# Patient Record
Sex: MALE | Race: WHITE | NOT HISPANIC OR LATINO | URBAN - METROPOLITAN AREA
[De-identification: names, ages, dates, MRNs, and addresses within clinical notes are randomized per-mention and may not be internally consistent; named-entity substitution may affect disease eponyms.]

---

## 2018-08-18 ENCOUNTER — EMERGENCY (EMERGENCY)
Facility: HOSPITAL | Age: 78
LOS: 1 days | End: 2018-08-18
Payer: MEDICARE

## 2018-08-18 PROCEDURE — 99282 EMERGENCY DEPT VISIT SF MDM: CPT

## 2019-09-16 ENCOUNTER — APPOINTMENT (OUTPATIENT)
Dept: RADIOLOGY | Facility: CLINIC | Age: 79
End: 2019-09-16
Payer: MEDICARE

## 2019-09-16 PROCEDURE — 71046 X-RAY EXAM CHEST 2 VIEWS: CPT

## 2019-10-02 ENCOUNTER — INBOUND DOCUMENT (OUTPATIENT)
Age: 79
End: 2019-10-02

## 2019-10-02 PROBLEM — Z00.00 ENCOUNTER FOR PREVENTIVE HEALTH EXAMINATION: Status: ACTIVE | Noted: 2019-10-02

## 2019-10-07 ENCOUNTER — APPOINTMENT (OUTPATIENT)
Dept: OTOLARYNGOLOGY | Facility: CLINIC | Age: 79
End: 2019-10-07
Payer: MEDICARE

## 2019-10-07 VITALS
HEART RATE: 69 BPM | WEIGHT: 194 LBS | HEIGHT: 73 IN | SYSTOLIC BLOOD PRESSURE: 157 MMHG | BODY MASS INDEX: 25.71 KG/M2 | DIASTOLIC BLOOD PRESSURE: 94 MMHG

## 2019-10-07 VITALS — HEIGHT: 73 IN | WEIGHT: 194 LBS | BODY MASS INDEX: 25.71 KG/M2

## 2019-10-07 DIAGNOSIS — K13.70 UNSPECIFIED LESIONS OF ORAL MUCOSA: ICD-10-CM

## 2019-10-07 PROCEDURE — 31575 DIAGNOSTIC LARYNGOSCOPY: CPT

## 2019-10-07 PROCEDURE — 99205 OFFICE O/P NEW HI 60 MIN: CPT | Mod: 25

## 2019-10-07 NOTE — REVIEW OF SYSTEMS
[Throat Clearing] : throat clearing [Throat Pain] : throat pain [Throat Itching] : throat itching [Throat Dryness] : throat dryness [Recent Weight Loss (___ Lbs)] : recent [unfilled] ~Ulb weight loss [Negative] : Heme/Lymph

## 2019-10-07 NOTE — PHYSICAL EXAM
[de-identified] : Left level IIa LN approx. 2 cm, firm, mobile, no TTP.  No other LAD. [Laryngoscopy Performed] : laryngoscopy was performed, see procedure section for findings [FreeTextEntry1] : Ulcerated lesion involving the L. tonsillar fossa and extending into the palate.  No other lesions in the OC/OPx. [Normal] : no rashes

## 2019-10-07 NOTE — PROCEDURE
[Lesion] : lesion identified by mirror examination needing further evaluation [None] : none [Flexible Endoscope] : examined with the flexible endoscope [Serial Number: ___] : Serial Number: [unfilled] [de-identified] : The disease in the L. tonsillar fossa appears to involve the inferior soft palate and BOT and the posterior pharyngeal wall.  No lesions in the larynx or HPx, VC mobile, airway patent.

## 2019-10-07 NOTE — HISTORY OF PRESENT ILLNESS
[de-identified] : Referred by Dr. Kohler for an oral lesion and soreness.  Pt has dysphagia, odynophagia since early August.  Denies otalgia, weight loss, SOB.  He has been smoking 2 cigars a day for the past 40 years.  She stopped drinking about 20 years ago.  He initially though that this was related to recent dental work.  He was treated with a course of antibiotics.  However, his oral surgeon assured him that it wasn't and he was sent by him to Dr. Lira.  CT neck done on 10/2/19 at [facility] which showed a mass arising from the L tonsil with probably mets to L neck.

## 2019-10-07 NOTE — CONSULT LETTER
[Dear  ___] : Dear  [unfilled], [Consult Letter:] : I had the pleasure of evaluating your patient, [unfilled]. [Please see my note below.] : Please see my note below. [Consult Closing:] : Thank you very much for allowing me to participate in the care of this patient.  If you have any questions, please do not hesitate to contact me. [Sincerely,] : Sincerely, [FreeTextEntry2] : Dr. Jamel Lira\par [address], [address]\[place] [address] [FreeTextEntry3] : Dev

## 2019-10-14 ENCOUNTER — OUTPATIENT (OUTPATIENT)
Dept: OUTPATIENT SERVICES | Facility: HOSPITAL | Age: 79
LOS: 1 days | End: 2019-10-14

## 2019-10-14 VITALS
WEIGHT: 190.04 LBS | HEIGHT: 71 IN | TEMPERATURE: 98 F | RESPIRATION RATE: 14 BRPM | HEART RATE: 68 BPM | DIASTOLIC BLOOD PRESSURE: 70 MMHG | OXYGEN SATURATION: 99 % | SYSTOLIC BLOOD PRESSURE: 144 MMHG

## 2019-10-14 DIAGNOSIS — Z90.49 ACQUIRED ABSENCE OF OTHER SPECIFIED PARTS OF DIGESTIVE TRACT: Chronic | ICD-10-CM

## 2019-10-14 DIAGNOSIS — Z98.49 CATARACT EXTRACTION STATUS, UNSPECIFIED EYE: Chronic | ICD-10-CM

## 2019-10-14 DIAGNOSIS — C09.9 MALIGNANT NEOPLASM OF TONSIL, UNSPECIFIED: ICD-10-CM

## 2019-10-14 RX ORDER — SODIUM CHLORIDE 9 MG/ML
1000 INJECTION, SOLUTION INTRAVENOUS
Refills: 0 | Status: DISCONTINUED | OUTPATIENT
Start: 2019-10-16 | End: 2019-11-04

## 2019-10-14 RX ORDER — SODIUM CHLORIDE 9 MG/ML
3 INJECTION INTRAMUSCULAR; INTRAVENOUS; SUBCUTANEOUS EVERY 8 HOURS
Refills: 0 | Status: DISCONTINUED | OUTPATIENT
Start: 2019-10-16 | End: 2019-11-04

## 2019-10-14 NOTE — H&P PST ADULT - HISTORY OF PRESENT ILLNESS
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 Patient states he experienced pain in mouth while getting some dental work in early August, cores are in upper palate, mucosa is pink with opaque center, tender. Patiejt states he was unaware of sore prior to dental work, now reports increased pain moderately improved. 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 Patient states he experienced pain in mouth while getting some dental work in early August, sores located on upper palate, described as very tender 7 out 10 on pain scale moderately improved with pain medication and aggravated with eating and drinking. CT scan on 10/3/19 noted mass on left peritonsillar on fossa.

## 2019-10-14 NOTE — H&P PST ADULT - FUNCTIONAL LEVEL PRIOR: DRESSING
Medication was refilled as requested.    Chronic insomnia  Chronic nonintractable headache, unspecified headache type  -     amitriptyline (ELAVIL) 10 MG tablet; Take 3 tablets by mouth nightly.  #90, refill 11.    
Patient's mother calling- She needs Dr Faust to issue RX for Amitriptyline 10 mg tabs- quant of three nightly.     Ro Kemp    
Requesting Amitriptyline 10 mg taking 3 tabs at bedtime.     8/29/2017 Last office visit with Ann Faust     Wt Readings from Last 1 Encounters:   08/29/17 47.6 kg (16 %, Z= -1.00)*     * Growth percentiles are based on [facility] 2-20 Years data.        BP Readings from Last 2 Encounters:   08/29/17 92/62   09/08/15 102/62   ]    No results found for: SODIUM, POTASSIUM, CHLORIDE, CO2, BUN, CREATININE, GLUCOSE  No results found for: HGBA1C  No results found for: TSH  No results found for: CHOLESTEROL, HDL, CALCLDL, TRIGLYCERIDE  No results found for: AST, GPT, GGTP, ALKPT, BILIRUBIN    
0 = independent

## 2019-10-14 NOTE — H&P PST ADULT - LYMPHATIC
No supraclavicular or clavicular adenopathy noted on assessment supraclavicular R/anterior cervical L/supraclavicular L/posterior cervical L/posterior cervical R/anterior cervical R/y noted on assessment

## 2019-10-14 NOTE — H&P PST ADULT - NEGATIVE CARDIOVASCULAR SYMPTOMS
no palpitations/no paroxysmal nocturnal dyspnea/no peripheral edema/no dyspnea on exertion/no orthopnea/no chest pain/no claudication

## 2019-10-14 NOTE — H&P PST ADULT - RS GEN PE MLT RESP DETAILS PC
good air movement/clear to auscultation bilaterally/no rales/breath sounds equal/no rhonchi/airway patent/respirations non-labored/no wheezes

## 2019-10-14 NOTE — H&P PST ADULT - NEGATIVE ENMT SYMPTOMS
no dry mouth/no nasal congestion/no nasal obstruction/no abnormal taste sensation/no hearing difficulty/no gum bleeding/no throat pain/no dysphagia/no ear pain/no nasal discharge/no vertigo/no sinus symptoms/no tinnitus

## 2019-10-14 NOTE — H&P PST ADULT - NSICDXPROBLEM_GEN_ALL_CORE_FT
Problem:  malignant neoplasm of tonsil   Assessment and Plan: Patient scheduled for direct laryngoscopy with biopsy esophagoscopy on 10/16/19  Patient provided with verbal and written presurgical instructions; verbalized understanding  with teach back.    Patient provided with famotidine for GI prophylaxis; verbalized understanding.      Medical  evaluation in chart along with blood work and EKG  Echo and cardiac workup requested Problem:  malignant neoplasm of tonsil   Assessment and Plan: Patient scheduled for direct laryngoscopy with biopsy esophagoscopy on 10/16/19  Patient provided with verbal and written presurgical instructions; verbalized understanding  with teach back.    Patient provided with famotidine for GI prophylaxis; verbalized understanding.      Medical  evaluation in chart along with blood work and EKG, blood pressure elevated, PCP followup in 1 week, "anxiety-related?"  Echo requested

## 2019-10-14 NOTE — H&P PST ADULT - NEGATIVE GENERAL GENITOURINARY SYMPTOMS
Render Post-Care Instructions In Note?: no Consent: The patient's consent was obtained including but not limited to risks of crusting, scabbing, blistering, scarring, darker or lighter pigmentary change, recurrence, incomplete removal and infection. Detail Level: Simple Duration Of Freeze Thaw-Cycle (Seconds): 0 Post-Care Instructions: I reviewed with the patient in detail post-care instructions. Patient is to wear sunprotection, and avoid picking at any of the treated lesions. Pt may apply Vaseline to crusted or scabbing areas. normal urinary frequency/no nocturia/no hematuria/no renal colic/no bladder infections

## 2019-10-14 NOTE — H&P PST ADULT - ENMT COMMENTS
PMH malignant neoplasm of tonsil- mouth sore- tender sore located in upper palate sore located in left upper palate, pink borders and opaque center, tender to touch

## 2019-10-15 ENCOUNTER — TRANSCRIPTION ENCOUNTER (OUTPATIENT)
Age: 79
End: 2019-10-15

## 2019-10-16 ENCOUNTER — APPOINTMENT (OUTPATIENT)
Dept: OTOLARYNGOLOGY | Facility: HOSPITAL | Age: 79
End: 2019-10-16

## 2019-10-16 ENCOUNTER — RESULT REVIEW (OUTPATIENT)
Age: 79
End: 2019-10-16

## 2019-10-16 ENCOUNTER — OUTPATIENT (OUTPATIENT)
Dept: OUTPATIENT SERVICES | Facility: HOSPITAL | Age: 79
LOS: 1 days | Discharge: HOME | End: 2019-10-16
Payer: MEDICARE

## 2019-10-16 VITALS
OXYGEN SATURATION: 95 % | RESPIRATION RATE: 16 BRPM | SYSTOLIC BLOOD PRESSURE: 146 MMHG | DIASTOLIC BLOOD PRESSURE: 88 MMHG | HEART RATE: 72 BPM

## 2019-10-16 VITALS
HEIGHT: 71 IN | DIASTOLIC BLOOD PRESSURE: 83 MMHG | HEART RATE: 70 BPM | TEMPERATURE: 98 F | OXYGEN SATURATION: 96 % | SYSTOLIC BLOOD PRESSURE: 169 MMHG | RESPIRATION RATE: 18 BRPM | WEIGHT: 190.04 LBS

## 2019-10-16 DIAGNOSIS — Z98.49 CATARACT EXTRACTION STATUS, UNSPECIFIED EYE: Chronic | ICD-10-CM

## 2019-10-16 DIAGNOSIS — C09.9 MALIGNANT NEOPLASM OF TONSIL, UNSPECIFIED: ICD-10-CM

## 2019-10-16 DIAGNOSIS — Z90.49 ACQUIRED ABSENCE OF OTHER SPECIFIED PARTS OF DIGESTIVE TRACT: Chronic | ICD-10-CM

## 2019-10-16 PROCEDURE — 31535 LARYNGOSCOPY W/BIOPSY: CPT | Mod: GC

## 2019-10-16 PROCEDURE — 88367 INSITU HYBRIDIZATION AUTO: CPT | Mod: 26

## 2019-10-16 PROCEDURE — 88342 IMHCHEM/IMCYTCHM 1ST ANTB: CPT | Mod: 26,59

## 2019-10-16 PROCEDURE — 88305 TISSUE EXAM BY PATHOLOGIST: CPT | Mod: 26

## 2019-10-16 PROCEDURE — 43191 ESOPHAGOSCOPY RIGID TRNSO DX: CPT | Mod: GC

## 2019-10-16 PROCEDURE — 88331 PATH CONSLTJ SURG 1 BLK 1SPC: CPT | Mod: 26

## 2019-10-16 PROCEDURE — 88365 INSITU HYBRIDIZATION (FISH): CPT | Mod: 26,59

## 2019-10-16 RX ORDER — FENTANYL CITRATE 50 UG/ML
25 INJECTION INTRAVENOUS
Refills: 0 | Status: DISCONTINUED | OUTPATIENT
Start: 2019-10-16 | End: 2019-10-16

## 2019-10-16 NOTE — ASU DISCHARGE PLAN (ADULT/PEDIATRIC) - CARE PROVIDER_API CALL
Wilfred Seymour)  Otolaryngology  [address] [place] [address]  Phone: [phone]  Fax: [phone]  Follow Up Time:

## 2019-10-16 NOTE — ASU DISCHARGE PLAN (ADULT/PEDIATRIC) - CALL YOUR DOCTOR IF YOU HAVE ANY OF THE FOLLOWING:
Inability to tolerate liquids or foods/Pain not relieved by Medications/Nausea and vomiting that does not stop/Unable to urinate/Bleeding that does not stop/Fever greater than (need to indicate Fahrenheit or Celsius)

## 2019-10-17 ENCOUNTER — OUTPATIENT (OUTPATIENT)
Dept: OUTPATIENT SERVICES | Facility: HOSPITAL | Age: 79
LOS: 1 days | Discharge: HOME | End: 2019-10-17

## 2019-10-17 DIAGNOSIS — Z98.49 CATARACT EXTRACTION STATUS, UNSPECIFIED EYE: Chronic | ICD-10-CM

## 2019-10-17 DIAGNOSIS — C09.9 MALIGNANT NEOPLASM OF TONSIL, UNSPECIFIED: ICD-10-CM

## 2019-10-17 DIAGNOSIS — Z90.49 ACQUIRED ABSENCE OF OTHER SPECIFIED PARTS OF DIGESTIVE TRACT: Chronic | ICD-10-CM

## 2019-10-20 ENCOUNTER — APPOINTMENT (OUTPATIENT)
Dept: NUCLEAR MEDICINE | Facility: CLINIC | Age: 79
End: 2019-10-20

## 2019-10-20 ENCOUNTER — FORM ENCOUNTER (OUTPATIENT)
Age: 79
End: 2019-10-20

## 2019-10-21 ENCOUNTER — OUTPATIENT (OUTPATIENT)
Dept: OUTPATIENT SERVICES | Facility: HOSPITAL | Age: 79
LOS: 1 days | End: 2019-10-21
Payer: MEDICARE

## 2019-10-21 ENCOUNTER — APPOINTMENT (OUTPATIENT)
Dept: NUCLEAR MEDICINE | Facility: CLINIC | Age: 79
End: 2019-10-21
Payer: MEDICARE

## 2019-10-21 DIAGNOSIS — Z90.49 ACQUIRED ABSENCE OF OTHER SPECIFIED PARTS OF DIGESTIVE TRACT: Chronic | ICD-10-CM

## 2019-10-21 DIAGNOSIS — Z98.49 CATARACT EXTRACTION STATUS, UNSPECIFIED EYE: Chronic | ICD-10-CM

## 2019-10-21 DIAGNOSIS — Z00.8 ENCOUNTER FOR OTHER GENERAL EXAMINATION: ICD-10-CM

## 2019-10-21 PROCEDURE — A9552: CPT

## 2019-10-21 PROCEDURE — 78815 PET IMAGE W/CT SKULL-THIGH: CPT

## 2019-10-21 PROCEDURE — 78815 PET IMAGE W/CT SKULL-THIGH: CPT | Mod: 26,PI

## 2019-10-22 ENCOUNTER — APPOINTMENT (OUTPATIENT)
Dept: OTOLARYNGOLOGY | Facility: CLINIC | Age: 79
End: 2019-10-22
Payer: MEDICARE

## 2019-10-22 VITALS
DIASTOLIC BLOOD PRESSURE: 110 MMHG | HEART RATE: 102 BPM | WEIGHT: 194 LBS | BODY MASS INDEX: 25.71 KG/M2 | SYSTOLIC BLOOD PRESSURE: 127 MMHG | HEIGHT: 73 IN

## 2019-10-22 PROCEDURE — 99214 OFFICE O/P EST MOD 30 MIN: CPT | Mod: 25

## 2019-10-22 PROCEDURE — 31575 DIAGNOSTIC LARYNGOSCOPY: CPT

## 2019-10-22 NOTE — PROCEDURE
[Lesion] : lesion identified by mirror examination needing further evaluation [None] : none [Flexible Endoscope] : examined with the flexible endoscope [Serial Number: ___] : Serial Number: [unfilled] [de-identified] : The disease in the L. tonsillar fossa appears to involve the inferior soft palate and BOT and the posterior pharyngeal wall. No lesions in the larynx or HPx, VC mobile, airway patent. \par

## 2019-10-22 NOTE — DATA REVIEWED
[de-identified] : PET/CT read is pending.  On my review, the primary disease extends into the NPx and involving the soft palate, extending to the posterior pharyngeal wall to at least the midline.  There is avidity in ipsilateral level II and III LNs.  No obvious distant disease.

## 2019-10-22 NOTE — HISTORY OF PRESENT ILLNESS
[de-identified] : Pt is s/p tonsil biopsy on 10/16/19 which resulted as SCCa.  HPV and P 16 are pending.  Pt had PET done yesterday.  Pt has appointments scheduled for Dr. Vidal and Dr. Brown. Pt denies pain.  He has discomfort.  Pt is eating a soft diet.  He is maintaining his weight.  He denies any bleeding.

## 2019-10-22 NOTE — CONSULT LETTER
[Dear  ___] : Dear  [unfilled], [Courtesy Letter:] : I had the pleasure of seeing your patient, [unfilled], in my office today. [Please see my note below.] : Please see my note below. [FreeTextEntry2] : Dr. Jamel Lira\par [address] [facility], [address]\[place] [address]  [Consult Closing:] : Thank you very much for allowing me to participate in the care of this patient.  If you have any questions, please do not hesitate to contact me. [Sincerely,] : Sincerely, [FreeTextEntry3] : Dev

## 2019-10-22 NOTE — PHYSICAL EXAM
[de-identified] : Left level IIa LN approx. 2 cm, firm, mobile, no TTP.  No other LAD. [Laryngoscopy Performed] : laryngoscopy was performed, see procedure section for findings [FreeTextEntry1] : Ulcerated lesion involving the L. tonsillar fossa and extending into the palate and posterior pharyngeal wall.  No other lesions in the OC/OPx. [Normal] : orientation to person, place, and time: normal

## 2019-10-28 ENCOUNTER — OUTPATIENT (OUTPATIENT)
Dept: OUTPATIENT SERVICES | Facility: HOSPITAL | Age: 79
LOS: 1 days | Discharge: HOME | End: 2019-10-28
Payer: MEDICARE

## 2019-10-28 DIAGNOSIS — Z90.49 ACQUIRED ABSENCE OF OTHER SPECIFIED PARTS OF DIGESTIVE TRACT: Chronic | ICD-10-CM

## 2019-10-28 DIAGNOSIS — Z98.49 CATARACT EXTRACTION STATUS, UNSPECIFIED EYE: Chronic | ICD-10-CM

## 2019-10-29 ENCOUNTER — APPOINTMENT (OUTPATIENT)
Dept: RADIATION ONCOLOGY | Facility: CLINIC | Age: 79
End: 2019-10-29
Payer: MEDICARE

## 2019-10-29 VITALS
TEMPERATURE: 98 F | BODY MASS INDEX: 25.71 KG/M2 | WEIGHT: 194 LBS | DIASTOLIC BLOOD PRESSURE: 87 MMHG | HEART RATE: 90 BPM | RESPIRATION RATE: 16 BRPM | OXYGEN SATURATION: 95 % | SYSTOLIC BLOOD PRESSURE: 157 MMHG | HEIGHT: 73 IN

## 2019-10-29 DIAGNOSIS — F17.290 NICOTINE DEPENDENCE, OTHER TOBACCO PRODUCT, UNCOMPLICATED: ICD-10-CM

## 2019-10-29 DIAGNOSIS — Z78.9 OTHER SPECIFIED HEALTH STATUS: ICD-10-CM

## 2019-10-29 DIAGNOSIS — F17.200 NICOTINE DEPENDENCE, UNSPECIFIED, UNCOMPLICATED: ICD-10-CM

## 2019-10-29 PROCEDURE — 99205 OFFICE O/P NEW HI 60 MIN: CPT | Mod: 25

## 2019-10-29 PROCEDURE — 77263 THER RADIOLOGY TX PLNG CPLX: CPT

## 2019-10-29 NOTE — LETTER GREETING
[Dear Doctor] : Dear Doctor, [Consult Letter:] : Your patient, [unfilled] was seen in my office today for consultation. [Please see my note below.] : Please see my note below. [FreeTextEntry2] : Wilfred Seymour MD

## 2019-10-29 NOTE — REVIEW OF SYSTEMS
[Dysphagia] : dysphagia [Odynophagia] : odynophagia [Negative] : Allergic/Immunologic [Loss of Hearing] : no loss of hearing [Nosebleeds] : no nosebleeds [FreeTextEntry4] : new left tonsil cancer diagnosis

## 2019-10-29 NOTE — LETTER CLOSING
[Consult Closing:] : Thank you for allowing me to participate in the care of this patient.  If you have any questions, please do not hesitate to contact me. [Sincerely yours,] : Sincerely yours, [FreeTextEntry3] : Nimesh Brown MD\par Physician in Chief\par Department of Radiation Medicine\par [facility]\par [facility]\par \par  of Radiation Medicine\par [facility]\par at  [facility]\par \par Radiation \par [facility]/\par [facility]\par [address]\par [place], [place] [address]\par \par Tel: [phone]\par Fax: [phone]\par

## 2019-10-29 NOTE — DISEASE MANAGEMENT
[Clinical] : TNM Stage: c [IV] : IV [FreeTextEntry4] : squamous cell carcinoma [TTNM] : 4b [NTNM] : 2b [MTNM] : x [de-identified] : 7000cGy [de-identified] : posterior oropharynx and necks

## 2019-10-29 NOTE — PHYSICAL EXAM
[Normal] : supple with no thyromegaly or masses appreciated [de-identified] : large ulcerative left tonsillar mass with apparent extension to soft palate,  BOT and posteror pharyngeal wall. Mirror exam of hypopharynx otherwise unremarkable.

## 2019-10-29 NOTE — VITALS
[Maximal Pain Intensity: 5/10] : 5/10 [Pain Description/Quality: ___] : Pain description/quality: [unfilled] [Pain Duration: ___] : Pain duration: [unfilled] [Pain Location: ___] : Pain Location: [unfilled] [Pain Interferes with ADLs] : Pain interferes with activities of daily living. [Opioid] : opioid [80: Normal activity with effort; some signs or symptoms of disease.] : 80: Normal activity with effort; some signs or symptoms of disease.  [ECOG Performance Status: 1 - Restricted in physically strenuous activity but ambulatory and able to carry out work of a light or sedentary nature] : Performance Status: 1 - Restricted in physically strenuous activity but ambulatory and able to carry out work of a light or sedentary nature, e.g., light house work, office work [Least Pain Intensity: 1/10] : 1/10

## 2019-10-29 NOTE — HISTORY OF PRESENT ILLNESS
[FreeTextEntry1] : This 79 year-old man presents for radiation medicine consultation accompanied by his daughter Stacy.  He was referred here by Dr. Seymour for squamous cell carcinoma of the left tonsil. Patient reports having dysphagia and odynophagia since early August of this year.   He was smoking 2 cigars a day for the past 40 years, and quit with this diagnosis. He stopped drinking alcohol in 2007.  He initially though that this was related to recent dental work, and was treated with a course of antibiotics.  However, his oral surgeon Dr. Nieves, noted a tonsillar lesion and referred him to Dr. Lira, ENT.  CT scan of the neck done on 10/2/19 at [facility] showed a mass arising from the left tonsil with probable mets to left neck.   He was referred to Dr. Seymour who performed biopsy.  \par  \par

## 2019-10-30 RX ORDER — OXYCODONE 5 MG/1
5 TABLET ORAL
Qty: 30 | Refills: 0 | Status: DISCONTINUED | COMMUNITY
Start: 2019-10-29 | End: 2019-10-30

## 2019-10-31 ENCOUNTER — APPOINTMENT (OUTPATIENT)
Dept: HEMATOLOGY ONCOLOGY | Facility: CLINIC | Age: 79
End: 2019-10-31
Payer: MEDICARE

## 2019-10-31 ENCOUNTER — RESULT REVIEW (OUTPATIENT)
Age: 79
End: 2019-10-31

## 2019-10-31 VITALS
DIASTOLIC BLOOD PRESSURE: 90 MMHG | HEART RATE: 86 BPM | SYSTOLIC BLOOD PRESSURE: 139 MMHG | HEIGHT: 73 IN | TEMPERATURE: 98.5 F | BODY MASS INDEX: 24.39 KG/M2 | WEIGHT: 184.05 LBS | OXYGEN SATURATION: 96 %

## 2019-10-31 LAB
BASOPHILS # BLD AUTO: 0 K/UL — SIGNIFICANT CHANGE UP (ref 0–0.2)
BASOPHILS NFR BLD AUTO: 0.5 % — SIGNIFICANT CHANGE UP (ref 0–2)
EOSINOPHIL # BLD AUTO: 0.2 K/UL — SIGNIFICANT CHANGE UP (ref 0–0.5)
EOSINOPHIL NFR BLD AUTO: 2.2 % — SIGNIFICANT CHANGE UP (ref 0–6)
HCT VFR BLD CALC: 44.7 % — SIGNIFICANT CHANGE UP (ref 39–50)
HGB BLD-MCNC: 14.3 G/DL — SIGNIFICANT CHANGE UP (ref 13–17)
LYMPHOCYTES # BLD AUTO: 1.5 K/UL — SIGNIFICANT CHANGE UP (ref 1–3.3)
LYMPHOCYTES # BLD AUTO: 16.2 % — SIGNIFICANT CHANGE UP (ref 13–44)
MCHC RBC-ENTMCNC: 29.5 PG — SIGNIFICANT CHANGE UP (ref 27–34)
MCHC RBC-ENTMCNC: 32 G/DL — SIGNIFICANT CHANGE UP (ref 32–36)
MCV RBC AUTO: 92.2 FL — SIGNIFICANT CHANGE UP (ref 80–100)
MONOCYTES # BLD AUTO: 0.8 K/UL — SIGNIFICANT CHANGE UP (ref 0–0.9)
MONOCYTES NFR BLD AUTO: 8.4 % — SIGNIFICANT CHANGE UP (ref 2–14)
NEUTROPHILS # BLD AUTO: 6.8 K/UL — SIGNIFICANT CHANGE UP (ref 1.8–7.4)
NEUTROPHILS NFR BLD AUTO: 72.6 % — SIGNIFICANT CHANGE UP (ref 43–77)
PLATELET # BLD AUTO: 433 K/UL — HIGH (ref 150–400)
RBC # BLD: 4.85 M/UL — SIGNIFICANT CHANGE UP (ref 4.2–5.8)
RBC # FLD: 12.8 % — SIGNIFICANT CHANGE UP (ref 10.3–14.5)
WBC # BLD: 9.3 K/UL — SIGNIFICANT CHANGE UP (ref 3.8–10.5)
WBC # FLD AUTO: 9.3 K/UL — SIGNIFICANT CHANGE UP (ref 3.8–10.5)

## 2019-10-31 PROCEDURE — 99205 OFFICE O/P NEW HI 60 MIN: CPT

## 2019-10-31 RX ORDER — OXYCODONE AND ACETAMINOPHEN 5; 325 MG/1; MG/1
5-325 TABLET ORAL
Qty: 28 | Refills: 0 | Status: DISCONTINUED | COMMUNITY
Start: 2019-10-07 | End: 2019-10-31

## 2019-10-31 NOTE — PHYSICAL EXAM
[Restricted in physically strenuous activity but ambulatory and able to carry out work of a light or sedentary nature] : Status 1- Restricted in physically strenuous activity but ambulatory and able to carry out work of a light or sedentary nature, e.g., light house work, office work [Normal] : affect appropriate [de-identified] : Large left bulbus polypoid tonsillar mass  [de-identified] : No palpable cervical lymph nodes.

## 2019-10-31 NOTE — HISTORY OF PRESENT ILLNESS
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  He reported having dysphagia and odynophagia since early August of this year.  He initially thought that this was related to recent dental work, and was treated with a course of antibiotics. However, his oral surgeon Dr. Nieves, noted a tonsillar lesion and referred him to ENT, Dr. Lira. CT scan of the neck 10/2/19 at [facility] showed a mass arising from the left tonsil with probable metastatic disease to the left neck. He was referred to Dr. Seymour who performed a biopsy on 10/16/19, pathology c/w SCC.  He had a staging PET on 10/22/19 which showed an FDG avid large soft tissue mass in the left palatine tonsillar region, SUV 52.5. Mass extends from the soft palate, nasopharynx and lateral oropharyngeal wall, to the level of the aryepiglottic fold, as noted on diagnostic CT.  Activity crosses the midline at the level of the nasopharynx and uvula. Multiple FDG avid left level II cervical nodes were present. [de-identified] : He was smoking 2 cigars a day for the past 40 years, and quit with this diagnosis. He stopped drinking alcohol in 2007. Last colonoscopy in the 1970s.  [de-identified] : Patient presents for initial evaluation.  + Odynophagia but no dysphagia. + Hoarseness. + Weight loss.  ROS otherwise negative. No other complaints today.

## 2019-10-31 NOTE — CONSULT LETTER
[Dear  ___] : Dear  [unfilled], [Consult Letter:] : I had the pleasure of evaluating your patient, [unfilled]. [Please see my note below.] : Please see my note below. [Consult Closing:] : Thank you very much for allowing me to participate in the care of this patient.  If you have any questions, please do not hesitate to contact me. [Sincerely,] : Sincerely, [DrKailyn  ___] : Dr. ROCHA [FreeTextEntry3] : Dr. Selena Vidal

## 2019-10-31 NOTE — RESULTS/DATA
[FreeTextEntry1] : 10/22/19 PET:\par FDG avid large soft tissue mass in the left palatine tonsillar region, SUV 52.5. Mass extends from the soft palate, nasopharynx and lateral oropharyngeal wall, to the level of the aryepiglottic fold, as noted on diagnostic CT. Activity crosses the midline at the level of the nasopharynx and uvula. Multiple FDG avid left level II cervical nodes. Most prominent nodes: -IIB node, 1.0 x 0.7 cm, SUV 28.3.  -IIA node, 1.2 x 1.1 cm, SUV 19.6. Several mildly FDG avid small left level III/V cervical nodes. Level III node, 0.6 x 0.4 cm, SUV 2.8.  Asymmetrical FDG avid foci in the region of the right glossopharyngeal sulcus, SUV 7.6 and right base of tongue, SUV 10.2.  Several mildly FDG avid small right level II and III nodes. Level IIA node, 1.1 x 0.6 cm, SUV 4.5.  FDG-avid focus in the proximal sigmoid colon, SUV 18.7.  Enlarged prostate, 5 cm in maximum transverse dimension. No abnormal FDG activity.\par \par 10/16/19 Pathology:\par Tonsil, left, biopsy - Squamous cell carcinoma, non-keratinizing.  \par p16 immunostain on block 1FSA is negative.  HPV16/18/31 MARLENY performed on block 1FSA by [facility] is weakly positive in tumor cell nuclei.\par \par 10/2/19 CT Neck:\par mass arising in the left palatine tonsil with extension to the nasopharynx, soft palate and lateral oropharyngeal wall with probable metastatic luis felipe disease in left level I.

## 2019-11-01 LAB
ALBUMIN SERPL ELPH-MCNC: 4.2 G/DL
ALP BLD-CCNC: 119 U/L
ALT SERPL-CCNC: 17 U/L
ANION GAP SERPL CALC-SCNC: 13 MMOL/L
AST SERPL-CCNC: 12 U/L
BILIRUB SERPL-MCNC: 0.4 MG/DL
BUN SERPL-MCNC: 10 MG/DL
CALCIUM SERPL-MCNC: 9.9 MG/DL
CHLORIDE SERPL-SCNC: 99 MMOL/L
CO2 SERPL-SCNC: 29 MMOL/L
CREAT SERPL-MCNC: 0.81 MG/DL
GLUCOSE SERPL-MCNC: 96 MG/DL
HBV CORE IGG+IGM SER QL: NONREACTIVE
HBV SURFACE AB SER QL: NONREACTIVE
HBV SURFACE AG SER QL: NONREACTIVE
HCV AB SER QL: NONREACTIVE
HCV S/CO RATIO: 0.11 S/CO
INR PPP: 1.17 RATIO
MAGNESIUM SERPL-MCNC: 2.1 MG/DL
POTASSIUM SERPL-SCNC: 4.1 MMOL/L
PROT SERPL-MCNC: 7 G/DL
PT BLD: 13.4 SEC
SODIUM SERPL-SCNC: 141 MMOL/L

## 2019-11-05 ENCOUNTER — APPOINTMENT (OUTPATIENT)
Dept: GASTROENTEROLOGY | Facility: CLINIC | Age: 79
End: 2019-11-05
Payer: MEDICARE

## 2019-11-05 VITALS
HEART RATE: 114 BPM | SYSTOLIC BLOOD PRESSURE: 127 MMHG | DIASTOLIC BLOOD PRESSURE: 87 MMHG | WEIGHT: 186 LBS | HEIGHT: 73 IN | BODY MASS INDEX: 24.65 KG/M2

## 2019-11-05 DIAGNOSIS — Z46.59 ENCOUNTER FOR FITTING AND ADJUSTMENT OF OTHER GASTROINTESTINAL APPLIANCE AND DEVICE: ICD-10-CM

## 2019-11-05 DIAGNOSIS — Z87.891 PERSONAL HISTORY OF NICOTINE DEPENDENCE: ICD-10-CM

## 2019-11-05 PROCEDURE — 99204 OFFICE O/P NEW MOD 45 MIN: CPT

## 2019-11-05 RX ORDER — DIPHENHYDRAMINE HYDROCHLORIDE AND LIDOCAINE HYDROCHLORIDE AND ALUMINUM HYDROXIDE AND MAGNESIUM HYDRO
KIT
Qty: 1 | Refills: 3 | Status: COMPLETED | COMMUNITY
Start: 2019-10-31 | End: 2019-11-05

## 2019-11-05 RX ORDER — PROCHLORPERAZINE MALEATE 10 MG/1
10 TABLET ORAL EVERY 6 HOURS
Qty: 120 | Refills: 0 | Status: COMPLETED | COMMUNITY
Start: 2019-10-31 | End: 2019-11-05

## 2019-11-05 RX ORDER — ONDANSETRON 8 MG/1
8 TABLET, ORALLY DISINTEGRATING ORAL EVERY 8 HOURS
Qty: 90 | Refills: 0 | Status: COMPLETED | COMMUNITY
Start: 2019-10-31 | End: 2019-11-05

## 2019-11-05 NOTE — PHYSICAL EXAM
[General Appearance - Alert] : alert [General Appearance - In No Acute Distress] : in no acute distress [General Appearance - Well Nourished] : well nourished [General Appearance - Well Developed] : well developed [General Appearance - Well-Appearing] : healthy appearing [Sclera] : the sclera and conjunctiva were normal [PERRL With Normal Accommodation] : pupils were equal in size, round, and reactive to light [Extraocular Movements] : extraocular movements were intact [Outer Ear] : the ears and nose were normal in appearance [Examination Of The Oral Cavity] : the lips and gums were normal [Oropharynx] : the oropharynx was normal [Neck Appearance] : the appearance of the neck was normal [Neck Cervical Mass (___cm)] : no neck mass was observed [Jugular Venous Distention Increased] : there was no jugular-venous distention [Thyroid Diffuse Enlargement] : the thyroid was not enlarged [Thyroid Nodule] : there were no palpable thyroid nodules [Auscultation Breath Sounds / Voice Sounds] : lungs were clear to auscultation bilaterally [Heart Rate And Rhythm] : heart rate was normal and rhythm regular [Heart Sounds] : normal S1 and S2 [Heart Sounds Gallop] : no gallops [Murmurs] : no murmurs [Heart Sounds Pericardial Friction Rub] : no pericardial rub [Bowel Sounds] : normal bowel sounds [Abdomen Soft] : soft [Abdomen Tenderness] : non-tender [] : no hepato-splenomegaly [Abdomen Mass (___ Cm)] : no abdominal mass palpated [No CVA Tenderness] : no ~M costovertebral angle tenderness [Abnormal Walk] : normal gait [Musculoskeletal - Swelling] : no joint swelling seen [Skin Color & Pigmentation] : normal skin color and pigmentation [Skin Turgor] : normal skin turgor [Oriented To Time, Place, And Person] : oriented to person, place, and time [FreeTextEntry1] : not done not indicated

## 2019-11-05 NOTE — REVIEW OF SYSTEMS
[As Noted in HPI] : as noted in HPI [Negative] : Heme/Lymph [Abdominal Pain] : no abdominal pain [Vomiting] : no vomiting [Constipation] : no constipation [Diarrhea] : no diarrhea [Heartburn] : no heartburn [Melena] : no melena [FreeTextEntry7] : dysphagia

## 2019-11-05 NOTE — ASSESSMENT
[FreeTextEntry1] : Impression: Dysphagia secondary to left tonsillar cancer with neck invasion patient will be starting radiation therapy soon for this cancer and a feeding tube is requested.\par \par Recommendations: Upper endoscopy with PEG tube placement will be done within the next 2 weeks at [facility]. He had lab work on October 31 including PT INR all of which are acceptable and preprocedure surgical testing is not required. The risks versus benefits of upper endoscopy with PEG tube placement were explained to the patient who appeared to understand all of the above and was agreeable to proceeding with upper endoscopy with PEG tube placement. His ASA classification is 3 and he is medically optimized for the proposed procedure and appeared to understand all of the above instructions, information, and management plan.

## 2019-11-06 ENCOUNTER — OTHER (OUTPATIENT)
Age: 79
End: 2019-11-06

## 2019-11-07 PROCEDURE — 77300 RADIATION THERAPY DOSE PLAN: CPT | Mod: 26

## 2019-11-07 PROCEDURE — 77338 DESIGN MLC DEVICE FOR IMRT: CPT | Mod: 26

## 2019-11-07 PROCEDURE — 77301 RADIOTHERAPY DOSE PLAN IMRT: CPT | Mod: 26

## 2019-11-15 ENCOUNTER — RESULT REVIEW (OUTPATIENT)
Age: 79
End: 2019-11-15

## 2019-11-15 ENCOUNTER — APPOINTMENT (OUTPATIENT)
Age: 79
End: 2019-11-15

## 2019-11-15 ENCOUNTER — LABORATORY RESULT (OUTPATIENT)
Age: 79
End: 2019-11-15

## 2019-11-15 LAB
BASOPHILS # BLD AUTO: 0.1 K/UL — SIGNIFICANT CHANGE UP (ref 0–0.2)
BASOPHILS NFR BLD AUTO: 0.8 % — SIGNIFICANT CHANGE UP (ref 0–2)
EOSINOPHIL # BLD AUTO: 0.3 K/UL — SIGNIFICANT CHANGE UP (ref 0–0.5)
EOSINOPHIL NFR BLD AUTO: 1.8 % — SIGNIFICANT CHANGE UP (ref 0–6)
HCT VFR BLD CALC: 43.9 % — SIGNIFICANT CHANGE UP (ref 39–50)
HGB BLD-MCNC: 14.4 G/DL — SIGNIFICANT CHANGE UP (ref 13–17)
LYMPHOCYTES # BLD AUTO: 1.9 K/UL — SIGNIFICANT CHANGE UP (ref 1–3.3)
LYMPHOCYTES # BLD AUTO: 13 % — SIGNIFICANT CHANGE UP (ref 13–44)
MCHC RBC-ENTMCNC: 29.7 PG — SIGNIFICANT CHANGE UP (ref 27–34)
MCHC RBC-ENTMCNC: 32.8 G/DL — SIGNIFICANT CHANGE UP (ref 32–36)
MCV RBC AUTO: 90.6 FL — SIGNIFICANT CHANGE UP (ref 80–100)
MONOCYTES # BLD AUTO: 1.3 K/UL — HIGH (ref 0–0.9)
MONOCYTES NFR BLD AUTO: 9 % — SIGNIFICANT CHANGE UP (ref 2–14)
NEUTROPHILS # BLD AUTO: 11 K/UL — HIGH (ref 1.8–7.4)
NEUTROPHILS NFR BLD AUTO: 75.4 % — SIGNIFICANT CHANGE UP (ref 43–77)
PLATELET # BLD AUTO: 422 K/UL — HIGH (ref 150–400)
RBC # BLD: 4.84 M/UL — SIGNIFICANT CHANGE UP (ref 4.2–5.8)
RBC # FLD: 12.6 % — SIGNIFICANT CHANGE UP (ref 10.3–14.5)
WBC # BLD: 14.6 K/UL — HIGH (ref 3.8–10.5)
WBC # FLD AUTO: 14.6 K/UL — HIGH (ref 3.8–10.5)

## 2019-11-15 PROCEDURE — 93010 ELECTROCARDIOGRAM REPORT: CPT

## 2019-11-15 PROCEDURE — 77427 RADIATION TX MANAGEMENT X5: CPT

## 2019-11-15 PROCEDURE — 77387B: CUSTOM | Mod: 26

## 2019-11-18 ENCOUNTER — OUTPATIENT (OUTPATIENT)
Dept: OUTPATIENT SERVICES | Facility: HOSPITAL | Age: 79
LOS: 1 days | Discharge: HOME | End: 2019-11-18

## 2019-11-18 VITALS
DIASTOLIC BLOOD PRESSURE: 74 MMHG | BODY MASS INDEX: 24.39 KG/M2 | WEIGHT: 184 LBS | SYSTOLIC BLOOD PRESSURE: 112 MMHG | HEART RATE: 78 BPM | TEMPERATURE: 98 F | OXYGEN SATURATION: 98 % | RESPIRATION RATE: 16 BRPM | HEIGHT: 73 IN

## 2019-11-18 DIAGNOSIS — Z90.49 ACQUIRED ABSENCE OF OTHER SPECIFIED PARTS OF DIGESTIVE TRACT: Chronic | ICD-10-CM

## 2019-11-18 DIAGNOSIS — E86.0 DEHYDRATION: ICD-10-CM

## 2019-11-18 DIAGNOSIS — Z51.11 ENCOUNTER FOR ANTINEOPLASTIC CHEMOTHERAPY: ICD-10-CM

## 2019-11-18 DIAGNOSIS — Z98.49 CATARACT EXTRACTION STATUS, UNSPECIFIED EYE: Chronic | ICD-10-CM

## 2019-11-18 DIAGNOSIS — R11.2 NAUSEA WITH VOMITING, UNSPECIFIED: ICD-10-CM

## 2019-11-18 DIAGNOSIS — C76.0 MALIGNANT NEOPLASM OF HEAD, FACE AND NECK: ICD-10-CM

## 2019-11-18 DIAGNOSIS — C09.9 MALIGNANT NEOPLASM OF TONSIL, UNSPECIFIED: ICD-10-CM

## 2019-11-18 PROCEDURE — 77387B: CUSTOM | Mod: 26

## 2019-11-18 NOTE — REVIEW OF SYSTEMS
[Constipation: Grade 0] : Constipation: Grade 0 [Diarrhea: Grade 0] : Diarrhea: Grade 0 [Dysphagia: Grade 0] : Dysphagia: Grade 0 [Vomiting: Grade 0] : Vomiting: Grade 0 [Nausea: Grade 1 - Loss of appetite without alteration in eating habits] : Nausea: Grade 1 - Loss of appetite without alteration in eating habits [Fatigue: Grade 0] : Fatigue: Grade 0

## 2019-11-18 NOTE — VITALS
[Maximal Pain Intensity: 0/10] : 0/10 [Least Pain Intensity: 0/10] : 0/10 [80: Normal activity with effort; some signs or symptoms of disease.] : 80: Normal activity with effort; some signs or symptoms of disease.  [NoTreatment Scheduled] : no treatment scheduled [ECOG Performance Status: 2 - Ambulatory and capable of all self care but unable to carry out any work activities] : Performance Status: 2 - Ambulatory and capable of all self care but unable to carry out any work activities. Up and about more than 50% of waking hours

## 2019-11-18 NOTE — DISEASE MANAGEMENT
[IV] : IV [Clinical] : TNM Stage: c [FreeTextEntry4] : squamous cell carcinoma [NTNM] : 2b [TTNM] : 4b [de-identified] : 7,000 cGy [MTNM] : 0 [de-identified] : 400 cGy [de-identified] : posterior oropharynx and necks

## 2019-11-19 PROCEDURE — 77387B: CUSTOM | Mod: 26

## 2019-11-20 PROCEDURE — 77014: CPT | Mod: 26

## 2019-11-21 ENCOUNTER — OUTPATIENT (OUTPATIENT)
Dept: OUTPATIENT SERVICES | Facility: HOSPITAL | Age: 79
LOS: 1 days | End: 2019-11-21
Payer: MEDICARE

## 2019-11-21 ENCOUNTER — APPOINTMENT (OUTPATIENT)
Dept: GASTROENTEROLOGY | Facility: HOSPITAL | Age: 79
End: 2019-11-21

## 2019-11-21 DIAGNOSIS — K29.90 GASTRODUODENITIS, UNSPECIFIED, W/OUT BLEEDING: ICD-10-CM

## 2019-11-21 DIAGNOSIS — Z98.49 CATARACT EXTRACTION STATUS, UNSPECIFIED EYE: Chronic | ICD-10-CM

## 2019-11-21 DIAGNOSIS — C09.9 MALIGNANT NEOPLASM OF TONSIL, UNSPECIFIED: ICD-10-CM

## 2019-11-21 DIAGNOSIS — Z90.49 ACQUIRED ABSENCE OF OTHER SPECIFIED PARTS OF DIGESTIVE TRACT: Chronic | ICD-10-CM

## 2019-11-21 DIAGNOSIS — R13.12 DYSPHAGIA, OROPHARYNGEAL PHASE: ICD-10-CM

## 2019-11-21 PROCEDURE — 43246 EGD PLACE GASTROSTOMY TUBE: CPT

## 2019-11-21 PROCEDURE — L8699: CPT

## 2019-11-21 NOTE — PROCEDURE
[Procedure Explained] : The procedure was explained [Allergies Reviewed] : allergies reviewed. [Risks] : Risks [Benefits] : benefits [Alternatives] : alternatives [Consent Obtained] : written consent was obtained prior to the procedure and is detailed in the patient's record [Patient] : the patient [Automated Blood Pressure Cuff] : automated blood pressure cuff [Cardiac Monitor] : cardiac monitor [Pulse Oximeter] : pulse oximeter [___ L/min Oxygen via NC] : [unfilled] ~Uliters/minute oxygen via nasal cannula [3] : 3 [Sedation Clearance] : the patient was cleared for moderate sedation [Performed By: ___] : Performed by:  AJ [Propofol ___ mg IV] : Propofol [unfilled] ~Umg intravenously [Time started: ___] : Start Time:  [unfilled] [Time Completed: ___] : Completion Time:  [unfilled] [Erythema] : erythema [Normal] : Normal [Tolerated Well] : the patient tolerated the procedure well [Vital Signs Stable] : the vital signs were stable [de-identified] : With PEG tube placement [de-identified] : Oropharyngeal dysphagia [de-identified] : PEG tube placed uneventfully. [de-identified] : Moderate antral gastritis seen [de-identified] : Bulbar erythema [de-identified] : Antral gastritis and duodenitis s/p successful PEG tube placement.

## 2019-11-21 NOTE — PROCEDURE
[Procedure Explained] : The procedure was explained [Allergies Reviewed] : allergies reviewed. [Risks] : Risks [Benefits] : benefits [Alternatives] : alternatives [Consent Obtained] : written consent was obtained prior to the procedure and is detailed in the patient's record [Patient] : the patient [Automated Blood Pressure Cuff] : automated blood pressure cuff [Cardiac Monitor] : cardiac monitor [Pulse Oximeter] : pulse oximeter [___ L/min Oxygen via NC] : [unfilled] ~Uliters/minute oxygen via nasal cannula [3] : 3 [Sedation Clearance] : the patient was cleared for moderate sedation [Performed By: ___] : Performed by:  AJ [Propofol ___ mg IV] : Propofol [unfilled] ~Umg intravenously [Time started: ___] : Start Time:  [unfilled] [Time Completed: ___] : Completion Time:  [unfilled] [Erythema] : erythema [Normal] : Normal [Tolerated Well] : the patient tolerated the procedure well [Vital Signs Stable] : the vital signs were stable [de-identified] : With PEG tube placement [de-identified] : Oropharyngeal dysphagia [de-identified] : PEG tube placed uneventfully. [de-identified] : Moderate antral gastritis seen [de-identified] : Bulbar erythema [de-identified] : Antral gastritis and duodenitis s/p successful PEG tube placement.

## 2019-11-21 NOTE — REVIEW OF SYSTEMS
[Negative] : Heme/Lymph [Abdominal Pain] : no abdominal pain [Vomiting] : no vomiting [Constipation] : no constipation [Diarrhea] : no diarrhea [Heartburn] : no heartburn [Melena] : no melena [FreeTextEntry7] : oropharyngeal dysphagia

## 2019-11-21 NOTE — REASON FOR VISIT
[Procedure: _________] : a [unfilled] procedure visit [Endoscopy] : an endoscopy [FreeTextEntry2] : EGD with PEG placement

## 2019-11-22 ENCOUNTER — APPOINTMENT (OUTPATIENT)
Dept: HEMATOLOGY ONCOLOGY | Facility: CLINIC | Age: 79
End: 2019-11-22
Payer: MEDICARE

## 2019-11-22 ENCOUNTER — APPOINTMENT (OUTPATIENT)
Dept: HEMATOLOGY ONCOLOGY | Facility: CLINIC | Age: 79
End: 2019-11-22

## 2019-11-22 ENCOUNTER — LABORATORY RESULT (OUTPATIENT)
Age: 79
End: 2019-11-22

## 2019-11-22 ENCOUNTER — RESULT REVIEW (OUTPATIENT)
Age: 79
End: 2019-11-22

## 2019-11-22 ENCOUNTER — APPOINTMENT (OUTPATIENT)
Age: 79
End: 2019-11-22

## 2019-11-22 LAB
BASOPHILS # BLD AUTO: 0.1 K/UL — SIGNIFICANT CHANGE UP (ref 0–0.2)
BASOPHILS NFR BLD AUTO: 0.9 % — SIGNIFICANT CHANGE UP (ref 0–2)
EOSINOPHIL # BLD AUTO: 0.1 K/UL — SIGNIFICANT CHANGE UP (ref 0–0.5)
EOSINOPHIL NFR BLD AUTO: 0.7 % — SIGNIFICANT CHANGE UP (ref 0–6)
HCT VFR BLD CALC: 41.7 % — SIGNIFICANT CHANGE UP (ref 39–50)
HGB BLD-MCNC: 14.4 G/DL — SIGNIFICANT CHANGE UP (ref 13–17)
LYMPHOCYTES # BLD AUTO: 1.5 K/UL — SIGNIFICANT CHANGE UP (ref 1–3.3)
LYMPHOCYTES # BLD AUTO: 15 % — SIGNIFICANT CHANGE UP (ref 13–44)
MCHC RBC-ENTMCNC: 30.6 PG — SIGNIFICANT CHANGE UP (ref 27–34)
MCHC RBC-ENTMCNC: 34.6 G/DL — SIGNIFICANT CHANGE UP (ref 32–36)
MCV RBC AUTO: 88.6 FL — SIGNIFICANT CHANGE UP (ref 80–100)
MONOCYTES # BLD AUTO: 0.6 K/UL — SIGNIFICANT CHANGE UP (ref 0–0.9)
MONOCYTES NFR BLD AUTO: 5.9 % — SIGNIFICANT CHANGE UP (ref 2–14)
NEUTROPHILS # BLD AUTO: 8 K/UL — HIGH (ref 1.8–7.4)
NEUTROPHILS NFR BLD AUTO: 77.5 % — HIGH (ref 43–77)
PLATELET # BLD AUTO: 375 K/UL — SIGNIFICANT CHANGE UP (ref 150–400)
RBC # BLD: 4.71 M/UL — SIGNIFICANT CHANGE UP (ref 4.2–5.8)
RBC # FLD: 11.7 % — SIGNIFICANT CHANGE UP (ref 10.3–14.5)
WBC # BLD: 10.3 K/UL — SIGNIFICANT CHANGE UP (ref 3.8–10.5)
WBC # FLD AUTO: 10.3 K/UL — SIGNIFICANT CHANGE UP (ref 3.8–10.5)

## 2019-11-22 PROCEDURE — 77387B: CUSTOM | Mod: 26

## 2019-11-22 PROCEDURE — 99213 OFFICE O/P EST LOW 20 MIN: CPT

## 2019-11-22 NOTE — REVIEW OF SYSTEMS
[Recent Change In Weight] : ~T recent weight change [Hoarseness] : hoarseness [Odynophagia] : odynophagia [Negative] : Allergic/Immunologic

## 2019-11-22 NOTE — PHYSICAL EXAM
[Restricted in physically strenuous activity but ambulatory and able to carry out work of a light or sedentary nature] : Status 1- Restricted in physically strenuous activity but ambulatory and able to carry out work of a light or sedentary nature, e.g., light house work, office work [Normal] : affect appropriate [de-identified] : Large left bulbus polypoid tonsillar mass  [de-identified] : No palpable cervical lymph nodes.

## 2019-11-22 NOTE — HISTORY OF PRESENT ILLNESS
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  He reported having dysphagia and odynophagia since early August of this year.  He initially thought that this was related to recent dental work, and was treated with a course of antibiotics. However, his oral surgeon Dr. Nieves, noted a tonsillar lesion and referred him to ENT, Dr. Lira. CT scan of the neck 10/2/19 at [facility] showed a mass arising from the left tonsil with probable metastatic disease to the left neck. He was referred to Dr. Seymour who performed a biopsy on 10/16/19, pathology c/w SCC.  He had a staging PET on 10/22/19 which showed an FDG avid large soft tissue mass in the left palatine tonsillar region, SUV 52.5. Mass extends from the soft palate, nasopharynx and lateral oropharyngeal wall, to the level of the aryepiglottic fold, as noted on diagnostic CT.  Activity crosses the midline at the level of the nasopharynx and uvula. Multiple FDG avid left level II cervical nodes were present. [de-identified] : He was smoking 2 cigars a day for the past 40 years, and quit with this diagnosis. He stopped drinking alcohol in 2007. Last colonoscopy in the 1970s.  [de-identified] : Patient presents for C2D1 CRT with weekly Cisplatin for SCC of the left tonsil.  + Odynophagia improved since starting RT.  No dysphagia. + Hoarseness. + Decreased appetite.  PEG placed yesterday.  + Pain at PEG site.  + Mild xerostomia.   No excessive secretions.

## 2019-11-25 VITALS
BODY MASS INDEX: 23.75 KG/M2 | DIASTOLIC BLOOD PRESSURE: 67 MMHG | WEIGHT: 180 LBS | SYSTOLIC BLOOD PRESSURE: 107 MMHG | HEART RATE: 78 BPM | OXYGEN SATURATION: 98 % | TEMPERATURE: 97.6 F | RESPIRATION RATE: 16 BRPM

## 2019-11-25 PROCEDURE — 77427 RADIATION TX MANAGEMENT X5: CPT

## 2019-11-25 PROCEDURE — 77387B: CUSTOM | Mod: 26

## 2019-11-25 NOTE — VITALS
[Maximal Pain Intensity: 0/10] : 0/10 [80: Normal activity with effort; some signs or symptoms of disease.] : 80: Normal activity with effort; some signs or symptoms of disease.  [Least Pain Intensity: 0/10] : 0/10 [NoTreatment Scheduled] : no treatment scheduled [ECOG Performance Status: 2 - Ambulatory and capable of all self care but unable to carry out any work activities] : Performance Status: 2 - Ambulatory and capable of all self care but unable to carry out any work activities. Up and about more than 50% of waking hours

## 2019-11-25 NOTE — REVIEW OF SYSTEMS
[Constipation: Grade 0] : Constipation: Grade 0 [Diarrhea: Grade 0] : Diarrhea: Grade 0 [Dysphagia: Grade 0] : Dysphagia: Grade 0 [Nausea: Grade 0] : Nausea: Grade 0 [Vomiting: Grade 0] : Vomiting: Grade 0 [Fatigue: Grade 0] : Fatigue: Grade 0 [Skin Hyperpigmentation: Grade 0] : Skin Hyperpigmentation: Grade 0 [Dermatitis Radiation: Grade 0] : Dermatitis Radiation: Grade 0

## 2019-11-25 NOTE — DISEASE MANAGEMENT
[Clinical] : TNM Stage: c [TTNM] : 4b [NTNM] : 2b [FreeTextEntry4] : squamous cell carcinoma [IV] : IV [de-identified] : 1,200 cGy [MTNM] : 0 [de-identified] : 7,000 cGy [de-identified] : posterior oropharynx and necks

## 2019-11-26 ENCOUNTER — OTHER (OUTPATIENT)
Age: 79
End: 2019-11-26

## 2019-11-26 PROCEDURE — 77387B: CUSTOM | Mod: 26

## 2019-11-27 PROCEDURE — 77014: CPT | Mod: 26

## 2019-11-29 ENCOUNTER — RESULT REVIEW (OUTPATIENT)
Age: 79
End: 2019-11-29

## 2019-11-29 ENCOUNTER — LABORATORY RESULT (OUTPATIENT)
Age: 79
End: 2019-11-29

## 2019-11-29 ENCOUNTER — APPOINTMENT (OUTPATIENT)
Age: 79
End: 2019-11-29

## 2019-11-29 ENCOUNTER — APPOINTMENT (OUTPATIENT)
Dept: HEMATOLOGY ONCOLOGY | Facility: CLINIC | Age: 79
End: 2019-11-29

## 2019-11-29 LAB
BASOPHILS # BLD AUTO: 0.1 K/UL — SIGNIFICANT CHANGE UP (ref 0–0.2)
EOSINOPHIL # BLD AUTO: 0.1 K/UL — SIGNIFICANT CHANGE UP (ref 0–0.5)
EOSINOPHIL NFR BLD AUTO: 1 % — SIGNIFICANT CHANGE UP (ref 0–6)
HCT VFR BLD CALC: 41.1 % — SIGNIFICANT CHANGE UP (ref 39–50)
HGB BLD-MCNC: 13.8 G/DL — SIGNIFICANT CHANGE UP (ref 13–17)
LYMPHOCYTES # BLD AUTO: 1.2 K/UL — SIGNIFICANT CHANGE UP (ref 1–3.3)
LYMPHOCYTES # BLD AUTO: 19 % — SIGNIFICANT CHANGE UP (ref 13–44)
MCHC RBC-ENTMCNC: 30.2 PG — SIGNIFICANT CHANGE UP (ref 27–34)
MCHC RBC-ENTMCNC: 33.7 G/DL — SIGNIFICANT CHANGE UP (ref 32–36)
MCV RBC AUTO: 89.7 FL — SIGNIFICANT CHANGE UP (ref 80–100)
MONOCYTES # BLD AUTO: 0.4 K/UL — SIGNIFICANT CHANGE UP (ref 0–0.9)
MONOCYTES NFR BLD AUTO: 8 % — SIGNIFICANT CHANGE UP (ref 2–14)
NEUTROPHILS # BLD AUTO: 3.8 K/UL — SIGNIFICANT CHANGE UP (ref 1.8–7.4)
NEUTROPHILS NFR BLD AUTO: 72 % — SIGNIFICANT CHANGE UP (ref 43–77)
OVALOCYTES BLD QL SMEAR: SLIGHT — SIGNIFICANT CHANGE UP
PLAT MORPH BLD: NORMAL — SIGNIFICANT CHANGE UP
PLATELET # BLD AUTO: 261 K/UL — SIGNIFICANT CHANGE UP (ref 150–400)
RBC # BLD: 4.58 M/UL — SIGNIFICANT CHANGE UP (ref 4.2–5.8)
RBC # FLD: 12.3 % — SIGNIFICANT CHANGE UP (ref 10.3–14.5)
RBC BLD AUTO: NORMAL — SIGNIFICANT CHANGE UP
WBC # BLD: 5.6 K/UL — SIGNIFICANT CHANGE UP (ref 3.8–10.5)
WBC # FLD AUTO: 5.6 K/UL — SIGNIFICANT CHANGE UP (ref 3.8–10.5)

## 2019-11-29 PROCEDURE — 77387B: CUSTOM | Mod: 26

## 2019-12-02 VITALS
SYSTOLIC BLOOD PRESSURE: 142 MMHG | DIASTOLIC BLOOD PRESSURE: 94 MMHG | WEIGHT: 179.4 LBS | OXYGEN SATURATION: 98 % | RESPIRATION RATE: 16 BRPM | BODY MASS INDEX: 23.67 KG/M2 | HEART RATE: 91 BPM | TEMPERATURE: 97.6 F

## 2019-12-02 PROCEDURE — 77387B: CUSTOM | Mod: 26

## 2019-12-02 NOTE — REVIEW OF SYSTEMS
[Constipation: Grade 0] : Constipation: Grade 0 [Diarrhea: Grade 0] : Diarrhea: Grade 0 [Dysphagia: Grade 0] : Dysphagia: Grade 0 [Vomiting: Grade 0] : Vomiting: Grade 0 [Nausea: Grade 0] : Nausea: Grade 0 [Fatigue: Grade 0] : Fatigue: Grade 0 [Skin Hyperpigmentation: Grade 0] : Skin Hyperpigmentation: Grade 0 [Dermatitis Radiation: Grade 0] : Dermatitis Radiation: Grade 0

## 2019-12-02 NOTE — DISEASE MANAGEMENT
[FreeTextEntry4] : squamous cell carcinoma [Clinical] : TNM Stage: c [TTNM] : 4b [NTNM] : 2b [MTNM] : 0 [IV] : IV [de-identified] : 2,000 cGy [de-identified] : posterior oropharynx and necks [de-identified] : 7,000 cGy

## 2019-12-02 NOTE — PHYSICAL EXAM
[Normal] : normal heart rate and rhythm, normal S1 and S2, and no murmurs present [Feeding Tube] : a feeding tube was present

## 2019-12-03 ENCOUNTER — OTHER (OUTPATIENT)
Age: 79
End: 2019-12-03

## 2019-12-03 PROCEDURE — 77387B: CUSTOM | Mod: 26

## 2019-12-03 PROCEDURE — 77427 RADIATION TX MANAGEMENT X5: CPT

## 2019-12-04 ENCOUNTER — OTHER (OUTPATIENT)
Age: 79
End: 2019-12-04

## 2019-12-04 PROCEDURE — 77387B: CUSTOM | Mod: 26

## 2019-12-05 PROCEDURE — 77387B: CUSTOM | Mod: 26

## 2019-12-06 ENCOUNTER — APPOINTMENT (OUTPATIENT)
Dept: HEMATOLOGY ONCOLOGY | Facility: CLINIC | Age: 79
End: 2019-12-06
Payer: MEDICARE

## 2019-12-06 ENCOUNTER — LABORATORY RESULT (OUTPATIENT)
Age: 79
End: 2019-12-06

## 2019-12-06 ENCOUNTER — RESULT REVIEW (OUTPATIENT)
Age: 79
End: 2019-12-06

## 2019-12-06 ENCOUNTER — APPOINTMENT (OUTPATIENT)
Age: 79
End: 2019-12-06

## 2019-12-06 LAB
ANISOCYTOSIS BLD QL: SLIGHT — SIGNIFICANT CHANGE UP
BASOPHILS # BLD AUTO: 0 K/UL — SIGNIFICANT CHANGE UP (ref 0–0.2)
EOSINOPHIL # BLD AUTO: 0 K/UL — SIGNIFICANT CHANGE UP (ref 0–0.5)
EOSINOPHIL NFR BLD AUTO: 1 % — SIGNIFICANT CHANGE UP (ref 0–6)
HCT VFR BLD CALC: 36.2 % — LOW (ref 39–50)
HGB BLD-MCNC: 12.3 G/DL — LOW (ref 13–17)
HYPOCHROMIA BLD QL: SLIGHT — SIGNIFICANT CHANGE UP
LYMPHOCYTES # BLD AUTO: 0.8 K/UL — LOW (ref 1–3.3)
LYMPHOCYTES # BLD AUTO: 19 % — SIGNIFICANT CHANGE UP (ref 13–44)
MCHC RBC-ENTMCNC: 30 PG — SIGNIFICANT CHANGE UP (ref 27–34)
MCHC RBC-ENTMCNC: 34 G/DL — SIGNIFICANT CHANGE UP (ref 32–36)
MCV RBC AUTO: 88.1 FL — SIGNIFICANT CHANGE UP (ref 80–100)
MONOCYTES # BLD AUTO: 0.5 K/UL — SIGNIFICANT CHANGE UP (ref 0–0.9)
MONOCYTES NFR BLD AUTO: 8 % — SIGNIFICANT CHANGE UP (ref 2–14)
NEUTROPHILS # BLD AUTO: 2.9 K/UL — SIGNIFICANT CHANGE UP (ref 1.8–7.4)
NEUTROPHILS NFR BLD AUTO: 71 % — SIGNIFICANT CHANGE UP (ref 43–77)
PLAT MORPH BLD: NORMAL — SIGNIFICANT CHANGE UP
PLATELET # BLD AUTO: 227 K/UL — SIGNIFICANT CHANGE UP (ref 150–400)
POIKILOCYTOSIS BLD QL AUTO: SLIGHT — SIGNIFICANT CHANGE UP
RBC # BLD: 4.11 M/UL — LOW (ref 4.2–5.8)
RBC # FLD: 12.2 % — SIGNIFICANT CHANGE UP (ref 10.3–14.5)
RBC BLD AUTO: SIGNIFICANT CHANGE UP
VARIANT LYMPHS # BLD: 1 % — SIGNIFICANT CHANGE UP (ref 0–6)
WBC # BLD: 4.2 K/UL — SIGNIFICANT CHANGE UP (ref 3.8–10.5)
WBC # FLD AUTO: 4.2 K/UL — SIGNIFICANT CHANGE UP (ref 3.8–10.5)

## 2019-12-06 PROCEDURE — 99213 OFFICE O/P EST LOW 20 MIN: CPT

## 2019-12-06 NOTE — REVIEW OF SYSTEMS
[Recent Change In Weight] : ~T recent weight change [Odynophagia] : odynophagia [Hoarseness] : hoarseness [Negative] : Allergic/Immunologic

## 2019-12-09 ENCOUNTER — APPOINTMENT (OUTPATIENT)
Dept: OTOLARYNGOLOGY | Facility: CLINIC | Age: 79
End: 2019-12-09
Payer: MEDICARE

## 2019-12-09 VITALS
SYSTOLIC BLOOD PRESSURE: 110 MMHG | HEIGHT: 73 IN | DIASTOLIC BLOOD PRESSURE: 69 MMHG | TEMPERATURE: 98 F | BODY MASS INDEX: 23.46 KG/M2 | WEIGHT: 177 LBS | HEART RATE: 84 BPM | OXYGEN SATURATION: 98 % | RESPIRATION RATE: 16 BRPM

## 2019-12-09 VITALS
HEIGHT: 73 IN | HEART RATE: 84 BPM | WEIGHT: 175 LBS | DIASTOLIC BLOOD PRESSURE: 69 MMHG | SYSTOLIC BLOOD PRESSURE: 110 MMHG | BODY MASS INDEX: 23.19 KG/M2

## 2019-12-09 PROCEDURE — 77387B: CUSTOM | Mod: 26

## 2019-12-09 PROCEDURE — 99214 OFFICE O/P EST MOD 30 MIN: CPT

## 2019-12-09 RX ORDER — OXYCODONE 5 MG/1
5 TABLET ORAL
Qty: 30 | Refills: 0 | Status: COMPLETED | COMMUNITY
Start: 2019-11-07 | End: 2019-12-09

## 2019-12-09 NOTE — REVIEW OF SYSTEMS
[Diarrhea: Grade 1 - Increase of <4 stools per day over baseline; mild increase in ostomy output compared to baseline] : Diarrhea: Grade 1 - Increase of <4 stools per day over baseline; mild increase in ostomy output compared to baseline [Nausea: Grade 1 - Loss of appetite without alteration in eating habits] : Nausea: Grade 1 - Loss of appetite without alteration in eating habits [Vomiting: Grade 0] : Vomiting: Grade 0 [Dermatitis Radiation: Grade 1 - Faint erythema or dry desquamation] : Dermatitis Radiation: Grade 1 - Faint erythema or dry desquamation [Fatigue: Grade 1 - Fatigue relieved by rest] : Fatigue: Grade 1 - Fatigue relieved by rest

## 2019-12-09 NOTE — CONSULT LETTER
[Dear  ___] : Dear  [unfilled], [Please see my note below.] : Please see my note below. [Consult Letter:] : I had the pleasure of evaluating your patient, [unfilled]. [Consult Closing:] : Thank you very much for allowing me to participate in the care of this patient.  If you have any questions, please do not hesitate to contact me. [Sincerely,] : Sincerely, [DrKailyn  ___] : Dr. ROCHA [FreeTextEntry3] : Dr. Selena Vidal

## 2019-12-09 NOTE — HISTORY OF PRESENT ILLNESS
[de-identified] : Pt is s/p tonsil biopsy on 10/16/19 which resulted as SCCa.  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  Pt is on weekly chemo (4/7) with  Dr. Vidal and radiation (11/35) with Dr. Brown .  Pt using PEG tube for water intake only.  pt is able to tolerate soft diet. Pt admits to have mild pain at mouth and some nausea. wt loss - was 194lbs down to 175lb today.  pt will have visiting nurse coming in 2 days to start feeding nutrient through the PEG tube.

## 2019-12-09 NOTE — PHYSICAL EXAM
[Normal] : no rashes [de-identified] : Left level IIa LN feels significantly improved, mild dermatitis, firm, mobile, no TTP.  No other LAD. [FreeTextEntry1] : L. tonsillar fossa tumor is responding to treatment, moderate mucositis, no other lesions in the OC/OPx.

## 2019-12-09 NOTE — HISTORY OF PRESENT ILLNESS
[FreeTextEntry1] : Pt is having slight "scratchiness" when swallowing.  Pt had a PEG however only putting water through it - [facility] will be coming on Wednesday to begin supplement.  Pt is drinking boast 2-3 cans a day.  Pt is apple to take "mushy" foods by mouth.  Pt states that he has no desire to eat and has no taste.  Skin with mild erythema - encouraged to apply aquaphor throughout the day.

## 2019-12-09 NOTE — HISTORY OF PRESENT ILLNESS
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  He reported having dysphagia and odynophagia since early August of this year.  He initially thought that this was related to recent dental work, and was treated with a course of antibiotics. However, his oral surgeon Dr. Nieves, noted a tonsillar lesion and referred him to ENT, Dr. Lira. CT scan of the neck 10/2/19 at [facility] showed a mass arising from the left tonsil with probable metastatic disease to the left neck. He was referred to Dr. Seymour who performed a biopsy on 10/16/19, pathology c/w SCC.  He had a staging PET on 10/22/19 which showed an FDG avid large soft tissue mass in the left palatine tonsillar region, SUV 52.5. Mass extends from the soft palate, nasopharynx and lateral oropharyngeal wall, to the level of the aryepiglottic fold, as noted on diagnostic CT.  Activity crosses the midline at the level of the nasopharynx and uvula. Multiple FDG avid left level II cervical nodes were present. [de-identified] : He was smoking 2 cigars a day for the past 40 years, and quit with this diagnosis. He stopped drinking alcohol in 2007. Last colonoscopy in the 1970s.  [de-identified] : Patient presents for C4D1 CRT with weekly Cisplatin for SCC of the left tonsil.  + Odynophagia.  + Hoarseness, improved.  + Anticipatory anxiety.  + Decreased appetite but weight stable.  PEG placed 11/21.  + Nausea.  + Xerostomia.  + Erythematous skin around neck.  No excessive secretions.  Pain at PEG site resolved.  No dysphagia.  No fevers, no chills.  No D/C.

## 2019-12-09 NOTE — PHYSICAL EXAM
[Restricted in physically strenuous activity but ambulatory and able to carry out work of a light or sedentary nature] : Status 1- Restricted in physically strenuous activity but ambulatory and able to carry out work of a light or sedentary nature, e.g., light house work, office work [Normal] : affect appropriate [de-identified] : Large left bulbus polypoid tonsillar mass  [de-identified] : No palpable cervical lymph nodes.

## 2019-12-09 NOTE — VITALS
[Maximal Pain Intensity: 2/10] : 2/10 [Least Pain Intensity: 0/10] : 0/10 [Pain Description/Quality: ___] : Pain description/quality: [unfilled] [Pain Location: ___] : Pain Location: [unfilled] [Pain Duration: ___] : Pain duration: [unfilled] [Pain Interferes with ADLs] : Pain interferes with activities of daily living. [NoTreatment Scheduled] : no treatment scheduled [80: Normal activity with effort; some signs or symptoms of disease.] : 80: Normal activity with effort; some signs or symptoms of disease.  [ECOG Performance Status: 2 - Ambulatory and capable of all self care but unable to carry out any work activities] : Performance Status: 2 - Ambulatory and capable of all self care but unable to carry out any work activities. Up and about more than 50% of waking hours

## 2019-12-09 NOTE — CONSULT LETTER
[Dear  ___] : Dear  [unfilled], [Courtesy Letter:] : I had the pleasure of seeing your patient, [unfilled], in my office today. [Please see my note below.] : Please see my note below. [Consult Closing:] : Thank you very much for allowing me to participate in the care of this patient.  If you have any questions, please do not hesitate to contact me. [Sincerely,] : Sincerely, [FreeTextEntry2] : Dr. Jamel Lira\par [address], [address]\[place] [address]  [FreeTextEntry3] : Dev

## 2019-12-10 PROCEDURE — 77387B: CUSTOM | Mod: 26

## 2019-12-11 ENCOUNTER — APPOINTMENT (OUTPATIENT)
Age: 79
End: 2019-12-11

## 2019-12-11 PROCEDURE — 77387B: CUSTOM | Mod: 26

## 2019-12-11 PROCEDURE — 77427 RADIATION TX MANAGEMENT X5: CPT

## 2019-12-12 ENCOUNTER — APPOINTMENT (OUTPATIENT)
Age: 79
End: 2019-12-12

## 2019-12-12 PROCEDURE — 77014: CPT | Mod: 26

## 2019-12-13 ENCOUNTER — APPOINTMENT (OUTPATIENT)
Dept: HEMATOLOGY ONCOLOGY | Facility: CLINIC | Age: 79
End: 2019-12-13

## 2019-12-13 ENCOUNTER — LABORATORY RESULT (OUTPATIENT)
Age: 79
End: 2019-12-13

## 2019-12-13 ENCOUNTER — RESULT REVIEW (OUTPATIENT)
Age: 79
End: 2019-12-13

## 2019-12-13 ENCOUNTER — APPOINTMENT (OUTPATIENT)
Age: 79
End: 2019-12-13

## 2019-12-13 LAB
BASOPHILS # BLD AUTO: 0 K/UL — SIGNIFICANT CHANGE UP (ref 0–0.2)
BASOPHILS NFR BLD AUTO: 1.7 % — SIGNIFICANT CHANGE UP (ref 0–2)
EOSINOPHIL # BLD AUTO: 0 K/UL — SIGNIFICANT CHANGE UP (ref 0–0.5)
EOSINOPHIL NFR BLD AUTO: 1.4 % — SIGNIFICANT CHANGE UP (ref 0–6)
HCT VFR BLD CALC: 32.3 % — LOW (ref 39–50)
HGB BLD-MCNC: 11.2 G/DL — LOW (ref 13–17)
LYMPHOCYTES # BLD AUTO: 0.4 K/UL — LOW (ref 1–3.3)
LYMPHOCYTES # BLD AUTO: 15.5 % — SIGNIFICANT CHANGE UP (ref 13–44)
MCHC RBC-ENTMCNC: 30.4 PG — SIGNIFICANT CHANGE UP (ref 27–34)
MCHC RBC-ENTMCNC: 34.6 G/DL — SIGNIFICANT CHANGE UP (ref 32–36)
MCV RBC AUTO: 87.7 FL — SIGNIFICANT CHANGE UP (ref 80–100)
MONOCYTES # BLD AUTO: 0.4 K/UL — SIGNIFICANT CHANGE UP (ref 0–0.9)
MONOCYTES NFR BLD AUTO: 16.6 % — HIGH (ref 2–14)
NEUTROPHILS # BLD AUTO: 1.7 K/UL — LOW (ref 1.8–7.4)
NEUTROPHILS NFR BLD AUTO: 64.8 % — SIGNIFICANT CHANGE UP (ref 43–77)
PLATELET # BLD AUTO: 163 K/UL — SIGNIFICANT CHANGE UP (ref 150–400)
RBC # BLD: 3.69 M/UL — LOW (ref 4.2–5.8)
RBC # FLD: 12.2 % — SIGNIFICANT CHANGE UP (ref 10.3–14.5)
WBC # BLD: 2.6 K/UL — LOW (ref 3.8–10.5)
WBC # FLD AUTO: 2.6 K/UL — LOW (ref 3.8–10.5)

## 2019-12-13 PROCEDURE — 77387B: CUSTOM | Mod: 26

## 2019-12-16 ENCOUNTER — APPOINTMENT (OUTPATIENT)
Age: 79
End: 2019-12-16

## 2019-12-16 VITALS
WEIGHT: 179.2 LBS | HEART RATE: 76 BPM | SYSTOLIC BLOOD PRESSURE: 153 MMHG | OXYGEN SATURATION: 100 % | BODY MASS INDEX: 23.64 KG/M2 | RESPIRATION RATE: 16 BRPM | DIASTOLIC BLOOD PRESSURE: 76 MMHG

## 2019-12-16 PROCEDURE — 77387B: CUSTOM | Mod: 26

## 2019-12-16 NOTE — DISCUSSION/SUMMARY
[Cancer Type / Location / Histology Subtype: ________] : Cancer Type / Location / Histology Subtype: [unfilled] [Diagnosis Date (year): ____] : Diagnosis Date (year): [unfilled] [Not Applicable] : not applicable [Surgery Date(s) (year): ____] : Surgery Date(s) (year): [unfilled] [Surgery] : Surgery: Yes [Surgical Procedure / Location / Findings: _________] : Surgical Procedure / Location / Findings: [unfilled] [Body Area Treated: _________] : Body Area Treated: [unfilled] [Radiation] : Radiation: Yes [End Date (year): ____] : End Date (year): [unfilled] [Follow up with Radiation MD in _____] : Follow up with Radiation MD in [unfilled] [FreeTextEntry8] : Giselle Blanco

## 2019-12-16 NOTE — REVIEW OF SYSTEMS
[Nausea: Grade 1 - Loss of appetite without alteration in eating habits] : Nausea: Grade 1 - Loss of appetite without alteration in eating habits [Diarrhea: Grade 1 - Increase of <4 stools per day over baseline; mild increase in ostomy output compared to baseline] : Diarrhea: Grade 1 - Increase of <4 stools per day over baseline; mild increase in ostomy output compared to baseline [Vomiting: Grade 0] : Vomiting: Grade 0 [Dermatitis Radiation: Grade 1 - Faint erythema or dry desquamation] : Dermatitis Radiation: Grade 1 - Faint erythema or dry desquamation [Fatigue: Grade 1 - Fatigue relieved by rest] : Fatigue: Grade 1 - Fatigue relieved by rest

## 2019-12-16 NOTE — VITALS
[Maximal Pain Intensity: 2/10] : 2/10 [Least Pain Intensity: 0/10] : 0/10 [Pain Duration: ___] : Pain duration: [unfilled] [Pain Description/Quality: ___] : Pain description/quality: [unfilled] [Pain Location: ___] : Pain Location: [unfilled] [Pain Interferes with ADLs] : Pain interferes with activities of daily living. [NoTreatment Scheduled] : no treatment scheduled [80: Normal activity with effort; some signs or symptoms of disease.] : 80: Normal activity with effort; some signs or symptoms of disease.  [ECOG Performance Status: 2 - Ambulatory and capable of all self care but unable to carry out any work activities] : Performance Status: 2 - Ambulatory and capable of all self care but unable to carry out any work activities. Up and about more than 50% of waking hours

## 2019-12-17 ENCOUNTER — OUTPATIENT (OUTPATIENT)
Dept: OUTPATIENT SERVICES | Facility: HOSPITAL | Age: 79
LOS: 1 days | Discharge: HOME | End: 2019-12-17

## 2019-12-17 DIAGNOSIS — Z98.49 CATARACT EXTRACTION STATUS, UNSPECIFIED EYE: Chronic | ICD-10-CM

## 2019-12-17 DIAGNOSIS — C09.9 MALIGNANT NEOPLASM OF TONSIL, UNSPECIFIED: ICD-10-CM

## 2019-12-17 DIAGNOSIS — Z90.49 ACQUIRED ABSENCE OF OTHER SPECIFIED PARTS OF DIGESTIVE TRACT: Chronic | ICD-10-CM

## 2019-12-17 PROCEDURE — 77387B: CUSTOM | Mod: 26

## 2019-12-17 NOTE — DISEASE MANAGEMENT
[Clinical] : TNM Stage: c [IV] : IV [NTNM] : 2 [TTNM] : 4b [MTNM] : 0 [de-identified] : 4948 [de-identified] : 4727 [de-identified] : tonsil

## 2019-12-17 NOTE — HISTORY OF PRESENT ILLNESS
[FreeTextEntry1] : Pt is having slight "scratchiness" when swallowing.  Pt is drinking boast 2-3 cans a day.  Pt is apple to take "mushy" foods by mouth.  Pt states that he has no desire to eat and has no taste.  Skin with mild erythema - encouraged to apply aquaphor throughout the day. He will be completing #6 weekly cisplantin  with Dr Vidal on Friday

## 2019-12-18 PROCEDURE — 77387B: CUSTOM | Mod: 26

## 2019-12-18 PROCEDURE — 77427 RADIATION TX MANAGEMENT X5: CPT

## 2019-12-19 PROCEDURE — 77014: CPT | Mod: 26

## 2019-12-20 ENCOUNTER — OTHER (OUTPATIENT)
Age: 79
End: 2019-12-20

## 2019-12-20 ENCOUNTER — LABORATORY RESULT (OUTPATIENT)
Age: 79
End: 2019-12-20

## 2019-12-20 ENCOUNTER — RESULT REVIEW (OUTPATIENT)
Age: 79
End: 2019-12-20

## 2019-12-20 ENCOUNTER — APPOINTMENT (OUTPATIENT)
Age: 79
End: 2019-12-20

## 2019-12-20 PROBLEM — F41.1 ANTICIPATORY ANXIETY: Status: ACTIVE | Noted: 2019-12-06

## 2019-12-20 LAB
ACANTHOCYTES BLD QL SMEAR: SLIGHT — SIGNIFICANT CHANGE UP
ANISOCYTOSIS BLD QL: SLIGHT — SIGNIFICANT CHANGE UP
BASOPHILS # BLD AUTO: 0 K/UL — SIGNIFICANT CHANGE UP (ref 0–0.2)
BASOPHILS NFR BLD AUTO: 1.1 % — SIGNIFICANT CHANGE UP (ref 0–2)
BUN SERPL-MCNC: 31 MG/DL — HIGH (ref 7–23)
CA-I BLDA-SCNC: 0.95 MMOL/L — LOW (ref 1.12–1.3)
CHLORIDE SERPL-SCNC: 87 MMOL/L — LOW (ref 96–108)
CO2 SERPL-SCNC: 30 MMOL/L — SIGNIFICANT CHANGE UP (ref 22–31)
CREAT SERPL-MCNC: 1.7 MG/DL — HIGH (ref 0.5–1.3)
EOSINOPHIL # BLD AUTO: 0 K/UL — SIGNIFICANT CHANGE UP (ref 0–0.5)
EOSINOPHIL NFR BLD AUTO: 0.8 % — SIGNIFICANT CHANGE UP (ref 0–6)
GLUCOSE SERPL-MCNC: 88 MG/DL — SIGNIFICANT CHANGE UP (ref 70–99)
HCT VFR BLD CALC: 30.9 % — LOW (ref 39–50)
HGB BLD-MCNC: 10.5 G/DL — LOW (ref 13–17)
HYPOCHROMIA BLD QL: SLIGHT — SIGNIFICANT CHANGE UP
LYMPHOCYTES # BLD AUTO: 0.6 K/UL — LOW (ref 1–3.3)
LYMPHOCYTES # BLD AUTO: 29 % — SIGNIFICANT CHANGE UP (ref 13–44)
MCHC RBC-ENTMCNC: 29.3 PG — SIGNIFICANT CHANGE UP (ref 27–34)
MCHC RBC-ENTMCNC: 33.9 G/DL — SIGNIFICANT CHANGE UP (ref 32–36)
MCV RBC AUTO: 86.4 FL — SIGNIFICANT CHANGE UP (ref 80–100)
METAMYELOCYTES # FLD: 1 % — HIGH (ref 0–0)
MONOCYTES # BLD AUTO: 0.3 K/UL — SIGNIFICANT CHANGE UP (ref 0–0.9)
MONOCYTES NFR BLD AUTO: 17 % — HIGH (ref 2–14)
NEUTROPHILS # BLD AUTO: 1.2 K/UL — LOW (ref 1.8–7.4)
NEUTROPHILS NFR BLD AUTO: 50 % — SIGNIFICANT CHANGE UP (ref 43–77)
PLAT MORPH BLD: NORMAL — SIGNIFICANT CHANGE UP
PLATELET # BLD AUTO: 132 K/UL — LOW (ref 150–400)
POIKILOCYTOSIS BLD QL AUTO: SLIGHT — SIGNIFICANT CHANGE UP
POTASSIUM SERPL-MCNC: 4.2 MMOL/L — SIGNIFICANT CHANGE UP (ref 3.5–5.3)
POTASSIUM SERPL-SCNC: 4.2 MMOL/L — SIGNIFICANT CHANGE UP (ref 3.5–5.3)
RBC # BLD: 3.58 M/UL — LOW (ref 4.2–5.8)
RBC # FLD: 11.7 % — SIGNIFICANT CHANGE UP (ref 10.3–14.5)
RBC BLD AUTO: SIGNIFICANT CHANGE UP
SODIUM SERPL-SCNC: 130 MMOL/L — LOW (ref 135–145)
VARIANT LYMPHS # BLD: 3 % — SIGNIFICANT CHANGE UP (ref 0–6)
WBC # BLD: 2.2 K/UL — LOW (ref 3.8–10.5)
WBC # FLD AUTO: 2.2 K/UL — LOW (ref 3.8–10.5)

## 2019-12-20 PROCEDURE — 77387B: CUSTOM | Mod: 26

## 2019-12-20 NOTE — REVIEW OF SYSTEMS
[Recent Change In Weight] : ~T recent weight change [Odynophagia] : odynophagia [Hoarseness] : hoarseness [Negative] : Heme/Lymph

## 2019-12-23 ENCOUNTER — APPOINTMENT (OUTPATIENT)
Dept: HEMATOLOGY ONCOLOGY | Facility: CLINIC | Age: 79
End: 2019-12-23
Payer: MEDICARE

## 2019-12-23 VITALS
TEMPERATURE: 98.6 F | BODY MASS INDEX: 23.2 KG/M2 | OXYGEN SATURATION: 93 % | HEIGHT: 73 IN | HEART RATE: 95 BPM | SYSTOLIC BLOOD PRESSURE: 112 MMHG | WEIGHT: 175.04 LBS | DIASTOLIC BLOOD PRESSURE: 69 MMHG

## 2019-12-23 VITALS
WEIGHT: 175 LBS | TEMPERATURE: 98.3 F | HEART RATE: 87 BPM | BODY MASS INDEX: 23.09 KG/M2 | OXYGEN SATURATION: 99 % | SYSTOLIC BLOOD PRESSURE: 122 MMHG | DIASTOLIC BLOOD PRESSURE: 81 MMHG | RESPIRATION RATE: 16 BRPM

## 2019-12-23 DIAGNOSIS — F41.1 GENERALIZED ANXIETY DISORDER: ICD-10-CM

## 2019-12-23 DIAGNOSIS — C76.0 MALIGNANT NEOPLASM OF HEAD, FACE AND NECK: ICD-10-CM

## 2019-12-23 DIAGNOSIS — E86.0 DEHYDRATION: ICD-10-CM

## 2019-12-23 PROCEDURE — 77387B: CUSTOM | Mod: 26

## 2019-12-23 PROCEDURE — 99215 OFFICE O/P EST HI 40 MIN: CPT

## 2019-12-23 NOTE — DISEASE MANAGEMENT
[Clinical] : TNM Stage: c [IV] : IV [FreeTextEntry4] : squamous cell carcinoma, left tonsil, HPV/p16 negative [TTNM] : 4b [NTNM] : 2 [MTNM] : 0 [de-identified] : 4,800 cGy [de-identified] : 7,000 cGy [de-identified] : tonsil

## 2019-12-23 NOTE — HISTORY OF PRESENT ILLNESS
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  He reported having dysphagia and odynophagia since early August of this year.  He initially thought that this was related to recent dental work, and was treated with a course of antibiotics. However, his oral surgeon Dr. Nieves, noted a tonsillar lesion and referred him to ENT, Dr. Lira. CT scan of the neck 10/2/19 at [facility] showed a mass arising from the left tonsil with probable metastatic disease to the left neck. He was referred to Dr. Seymour who performed a biopsy on 10/16/19, pathology c/w SCC.  He had a staging PET on 10/22/19 which showed an FDG avid large soft tissue mass in the left palatine tonsillar region, SUV 52.5. Mass extends from the soft palate, nasopharynx and lateral oropharyngeal wall, to the level of the aryepiglottic fold, as noted on diagnostic CT.  Activity crosses the midline at the level of the nasopharynx and uvula. Multiple FDG avid left level II cervical nodes were present. [de-identified] : He was smoking 2 cigars a day for the past 40 years, and quit with this diagnosis. He stopped drinking alcohol in 2007. Last colonoscopy in the 1970s.  [de-identified] : Patient presents s/p 5 cycles of CRT with weekly Cisplatin for SCC of the left tonsil.  + Odynophagia, 4/10.  + Hoarseness, improved.  + Anticipatory anxiety.  + Decreased appetite but weight stable.  PEG placed 11/21.  + Nausea. + Xerostomia.  + Erythematous skin around neck.  No excessive secretions.  Pain at PEG site resolved.  No dysphagia.  No fevers, no chills.  No D/C. No other complaints today.

## 2019-12-23 NOTE — PHYSICAL EXAM
[Restricted in physically strenuous activity but ambulatory and able to carry out work of a light or sedentary nature] : Status 1- Restricted in physically strenuous activity but ambulatory and able to carry out work of a light or sedentary nature, e.g., light house work, office work [Normal] : affect appropriate [de-identified] : Large left bulbus polypoid tonsillar mass  [de-identified] : No palpable cervical lymph nodes.

## 2019-12-23 NOTE — PHYSICAL EXAM
[Feeding Tube] : a feeding tube was present [General Appearance - Alert] : alert [Extraocular Movements] : extraocular movements were intact [Normal] : oriented to person, place and time, the affect was normal, the mood was normal and not anxious [de-identified] : grade 2 oropharyngeal mucositis [de-identified] : brisk erythema b/l necks; dry desquamation, no moist desquamation

## 2019-12-23 NOTE — VITALS
[Least Pain Intensity: 0/10] : 0/10 [Pain Description/Quality: ___] : Pain description/quality: [unfilled] [Pain Duration: ___] : Pain duration: [unfilled] [Pain Interferes with ADLs] : Pain interferes with activities of daily living. [Pain Location: ___] : Pain Location: [unfilled] [80: Normal activity with effort; some signs or symptoms of disease.] : 80: Normal activity with effort; some signs or symptoms of disease.  [ECOG Performance Status: 2 - Ambulatory and capable of all self care but unable to carry out any work activities] : Performance Status: 2 - Ambulatory and capable of all self care but unable to carry out any work activities. Up and about more than 50% of waking hours [Maximal Pain Intensity: 5/10] : 5/10 [Opioid] : opioid

## 2019-12-23 NOTE — RESULTS/DATA
[FreeTextEntry1] : 10/22/19 PET:\par FDG avid large soft tissue mass in the left palatine tonsillar region, SUV 52.5. Mass extends from the soft palate, nasopharynx and lateral oropharyngeal wall, to the level of the aryepiglottic fold, as noted on diagnostic CT. Activity crosses the midline at the level of the nasopharynx and uvula. Multiple FDG avid left level II cervical nodes. Most prominent nodes: -IIB node, 1.0 x 0.7 cm, SUV 28.3.  -IIA node, 1.2 x 1.1 cm, SUV 19.6. Several mildly FDG avid small left level III/V cervical nodes. Level III node, 0.6 x 0.4 cm, SUV 2.8.  Asymmetrical FDG avid foci in the region of the right glossopharyngeal sulcus, SUV 7.6 and right base of tongue, SUV 10.2.  Several mildly FDG avid small right level II and III nodes. Level IIA node, 1.1 x 0.6 cm, SUV 4.5.  FDG-avid focus in the proximal sigmoid colon, SUV 18.7.  Enlarged prostate, 5 cm in maximum transverse dimension. No abnormal FDG activity.\par \par 10/16/19 Pathology:\par Tonsil, left, biopsy - Squamous cell carcinoma, non-keratinizing.  \par p16 immunostain on block 1FSA is negative.  HPV16/18/31 MARLENY performed on block 1FSA by [facility] is weakly positive in tumor cell nuclei.\par \par 10/2/19 CT Neck:\par mass arising in the left palatine tonsil with extension to the nasopharynx, soft palate and lateral oropharyngeal wall with probable metastatic luis fleipe disease in left level I.

## 2019-12-23 NOTE — REVIEW OF SYSTEMS
[Dysphagia: Grade 2 - Symptomatic and altered eating/swallowing] : Dysphagia: Grade 2 - Symptomatic and altered eating/swallowing [Vomiting: Grade 0] : Vomiting: Grade 0 [Nausea: Grade 1 - Loss of appetite without alteration in eating habits] : Nausea: Grade 1 - Loss of appetite without alteration in eating habits [Mucositis Oral: Grade 2 - Moderate pain; not interfering with oral intake; modified diet indicated] : Mucositis Oral: Grade 2 - Moderate pain; not interfering with oral intake; modified diet indicated [Fatigue: Grade 1 - Fatigue relieved by rest] : Fatigue: Grade 1 - Fatigue relieved by rest [Dysgeusia: Grade 1- Altered taste but no change in diet] : Dysgeusia: Grade 1 - Altered taste but no change in diet [Xerostomia: Grade 1 - Symptomatic (e.g., dry or thick saliva) without significant dietary alteration; unstimulated saliva flow >0.2 ml/min] : Xerostomia: Grade 1 - Symptomatic (e.g., dry or thick saliva) without significant dietary alteration; unstimulated saliva flow >0.2 ml/min [Oral Pain: Grade 2 - Moderate pain; limiting instrumental ADL] : Oral Pain: Grade 2 - Moderate pain; limiting instrumental ADL [Dermatitis Radiation: Grade 2 - Moderate to brisk erythema; patchy moist desquamation, mostly confined to skin folds and creases; moderate edema] : Dermatitis Radiation: Grade 2 - Moderate to brisk erythema; patchy moist desquamation, mostly confined to skin folds and creases; moderate edema [Skin Hyperpigmentation: Grade 1 - Hyperpigmentation covering <10% BSA; no psychosocial impact] : Skin Hyperpigmentation: Grade 1 - Hyperpigmentation covering <10% BSA; no psychosocial impact [Skin Atrophy: Grade 0] : Skin Atrophy: Grade 0

## 2019-12-24 LAB
ALBUMIN SERPL ELPH-MCNC: 3.5 G/DL
ALP BLD-CCNC: 118 U/L
ALT SERPL-CCNC: 18 U/L
ANION GAP SERPL CALC-SCNC: 16 MMOL/L
AST SERPL-CCNC: 19 U/L
BILIRUB SERPL-MCNC: 0.4 MG/DL
BUN SERPL-MCNC: 28 MG/DL
CALCIUM SERPL-MCNC: 7.5 MG/DL
CHLORIDE SERPL-SCNC: 88 MMOL/L
CO2 SERPL-SCNC: 28 MMOL/L
CREAT SERPL-MCNC: 1.34 MG/DL
GLUCOSE SERPL-MCNC: 106 MG/DL
MAGNESIUM SERPL-MCNC: 0.9 MG/DL
POTASSIUM SERPL-SCNC: 3.8 MMOL/L
PROT SERPL-MCNC: 6.1 G/DL
SODIUM SERPL-SCNC: 132 MMOL/L

## 2019-12-24 PROCEDURE — 77387B: CUSTOM | Mod: 26

## 2019-12-26 PROCEDURE — 77014: CPT | Mod: 26

## 2019-12-27 ENCOUNTER — APPOINTMENT (OUTPATIENT)
Age: 79
End: 2019-12-27

## 2019-12-27 ENCOUNTER — LABORATORY RESULT (OUTPATIENT)
Age: 79
End: 2019-12-27

## 2019-12-27 ENCOUNTER — INPATIENT (INPATIENT)
Facility: HOSPITAL | Age: 79
LOS: 6 days | Discharge: HOSPICE | DRG: 871 | End: 2020-01-03
Attending: INTERNAL MEDICINE | Admitting: HOSPITALIST
Payer: MEDICARE

## 2019-12-27 VITALS
RESPIRATION RATE: 18 BRPM | DIASTOLIC BLOOD PRESSURE: 54 MMHG | OXYGEN SATURATION: 93 % | SYSTOLIC BLOOD PRESSURE: 90 MMHG | TEMPERATURE: 99 F | HEART RATE: 95 BPM | WEIGHT: 184.09 LBS | HEIGHT: 72 IN

## 2019-12-27 DIAGNOSIS — R55 SYNCOPE AND COLLAPSE: ICD-10-CM

## 2019-12-27 DIAGNOSIS — Z98.49 CATARACT EXTRACTION STATUS, UNSPECIFIED EYE: Chronic | ICD-10-CM

## 2019-12-27 DIAGNOSIS — Z90.49 ACQUIRED ABSENCE OF OTHER SPECIFIED PARTS OF DIGESTIVE TRACT: Chronic | ICD-10-CM

## 2019-12-27 LAB
ALBUMIN SERPL ELPH-MCNC: 3.2 G/DL — LOW (ref 3.3–5.2)
ALP SERPL-CCNC: 121 U/L — HIGH (ref 40–120)
ALT FLD-CCNC: 24 U/L — SIGNIFICANT CHANGE UP
ANION GAP SERPL CALC-SCNC: 16 MMOL/L — SIGNIFICANT CHANGE UP (ref 5–17)
ANISOCYTOSIS BLD QL: SLIGHT — SIGNIFICANT CHANGE UP
APTT BLD: 29.1 SEC — SIGNIFICANT CHANGE UP (ref 27.5–36.3)
AST SERPL-CCNC: 29 U/L — SIGNIFICANT CHANGE UP
BASOPHILS # BLD AUTO: 0 K/UL — SIGNIFICANT CHANGE UP (ref 0–0.2)
BASOPHILS NFR BLD AUTO: 0 % — SIGNIFICANT CHANGE UP (ref 0–2)
BILIRUB SERPL-MCNC: 0.9 MG/DL — SIGNIFICANT CHANGE UP (ref 0.4–2)
BLD GP AB SCN SERPL QL: SIGNIFICANT CHANGE UP
BUN SERPL-MCNC: 34 MG/DL — HIGH (ref 8–20)
CALCIUM SERPL-MCNC: 8 MG/DL — LOW (ref 8.6–10.2)
CHLORIDE SERPL-SCNC: 82 MMOL/L — LOW (ref 98–107)
CO2 SERPL-SCNC: 28 MMOL/L — SIGNIFICANT CHANGE UP (ref 22–29)
CREAT SERPL-MCNC: 1.85 MG/DL — HIGH (ref 0.5–1.3)
EOSINOPHIL # BLD AUTO: 0 K/UL — SIGNIFICANT CHANGE UP (ref 0–0.5)
EOSINOPHIL NFR BLD AUTO: 0 % — SIGNIFICANT CHANGE UP (ref 0–6)
GIANT PLATELETS BLD QL SMEAR: PRESENT — SIGNIFICANT CHANGE UP
GLUCOSE SERPL-MCNC: 111 MG/DL — SIGNIFICANT CHANGE UP (ref 70–115)
HCT VFR BLD CALC: 23.1 % — LOW (ref 39–50)
HGB BLD-MCNC: 8 G/DL — LOW (ref 13–17)
INR BLD: 1.4 RATIO — HIGH (ref 0.88–1.16)
LACTATE BLDV-MCNC: 1 MMOL/L — SIGNIFICANT CHANGE UP (ref 0.5–2)
LYMPHOCYTES # BLD AUTO: 0.21 K/UL — LOW (ref 1–3.3)
LYMPHOCYTES # BLD AUTO: 7 % — LOW (ref 13–44)
MAGNESIUM SERPL-MCNC: 1.1 MG/DL — LOW (ref 1.6–2.6)
MANUAL SMEAR VERIFICATION: SIGNIFICANT CHANGE UP
MCHC RBC-ENTMCNC: 30.1 PG — SIGNIFICANT CHANGE UP (ref 27–34)
MCHC RBC-ENTMCNC: 34.6 GM/DL — SIGNIFICANT CHANGE UP (ref 32–36)
MCV RBC AUTO: 86.8 FL — SIGNIFICANT CHANGE UP (ref 80–100)
MONOCYTES # BLD AUTO: 0.16 K/UL — SIGNIFICANT CHANGE UP (ref 0–0.9)
MONOCYTES NFR BLD AUTO: 5.3 % — SIGNIFICANT CHANGE UP (ref 2–14)
NEUTROPHILS # BLD AUTO: 2.6 K/UL — SIGNIFICANT CHANGE UP (ref 1.8–7.4)
NEUTROPHILS NFR BLD AUTO: 67.5 % — SIGNIFICANT CHANGE UP (ref 43–77)
NEUTS BAND # BLD: 19.3 % — HIGH (ref 0–8)
OVALOCYTES BLD QL SMEAR: SLIGHT — SIGNIFICANT CHANGE UP
PLAT MORPH BLD: NORMAL — SIGNIFICANT CHANGE UP
PLATELET # BLD AUTO: 222 K/UL — SIGNIFICANT CHANGE UP (ref 150–400)
POTASSIUM SERPL-MCNC: 4 MMOL/L — SIGNIFICANT CHANGE UP (ref 3.5–5.3)
POTASSIUM SERPL-SCNC: 4 MMOL/L — SIGNIFICANT CHANGE UP (ref 3.5–5.3)
PROT SERPL-MCNC: 6.3 G/DL — LOW (ref 6.6–8.7)
PROTHROM AB SERPL-ACNC: 16.3 SEC — HIGH (ref 10–12.9)
RBC # BLD: 2.66 M/UL — LOW (ref 4.2–5.8)
RBC # FLD: 13.5 % — SIGNIFICANT CHANGE UP (ref 10.3–14.5)
RBC BLD AUTO: SIGNIFICANT CHANGE UP
SODIUM SERPL-SCNC: 126 MMOL/L — LOW (ref 135–145)
TROPONIN T SERPL-MCNC: 0.01 NG/ML — SIGNIFICANT CHANGE UP (ref 0–0.06)
VARIANT LYMPHS # BLD: 0.9 % — SIGNIFICANT CHANGE UP (ref 0–6)
WBC # BLD: 3 K/UL — LOW (ref 3.8–10.5)
WBC # FLD AUTO: 3 K/UL — LOW (ref 3.8–10.5)

## 2019-12-27 PROCEDURE — 77387B: CUSTOM | Mod: 26

## 2019-12-27 PROCEDURE — 70450 CT HEAD/BRAIN W/O DYE: CPT | Mod: 26

## 2019-12-27 PROCEDURE — 71045 X-RAY EXAM CHEST 1 VIEW: CPT | Mod: 26

## 2019-12-27 PROCEDURE — 99285 EMERGENCY DEPT VISIT HI MDM: CPT

## 2019-12-27 PROCEDURE — 99223 1ST HOSP IP/OBS HIGH 75: CPT

## 2019-12-27 RX ORDER — CEFEPIME 1 G/1
2000 INJECTION, POWDER, FOR SOLUTION INTRAMUSCULAR; INTRAVENOUS EVERY 12 HOURS
Refills: 0 | Status: DISCONTINUED | OUTPATIENT
Start: 2019-12-27 | End: 2020-01-01

## 2019-12-27 RX ORDER — SODIUM CHLORIDE 9 MG/ML
1000 INJECTION INTRAMUSCULAR; INTRAVENOUS; SUBCUTANEOUS ONCE
Refills: 0 | Status: COMPLETED | OUTPATIENT
Start: 2019-12-27 | End: 2019-12-27

## 2019-12-27 RX ORDER — ACETAMINOPHEN 500 MG
975 TABLET ORAL ONCE
Refills: 0 | Status: COMPLETED | OUTPATIENT
Start: 2019-12-27 | End: 2019-12-27

## 2019-12-27 RX ORDER — MAGNESIUM SULFATE 500 MG/ML
2 VIAL (ML) INJECTION ONCE
Refills: 0 | Status: COMPLETED | OUTPATIENT
Start: 2019-12-27 | End: 2019-12-27

## 2019-12-27 RX ORDER — VANCOMYCIN HCL 1 G
1000 VIAL (EA) INTRAVENOUS ONCE
Refills: 0 | Status: COMPLETED | OUTPATIENT
Start: 2019-12-27 | End: 2019-12-27

## 2019-12-27 RX ORDER — ACETAMINOPHEN 500 MG
1000 TABLET ORAL ONCE
Refills: 0 | Status: COMPLETED | OUTPATIENT
Start: 2019-12-27 | End: 2019-12-27

## 2019-12-27 RX ADMIN — Medication 250 MILLIGRAM(S): at 22:56

## 2019-12-27 RX ADMIN — SODIUM CHLORIDE 1000 MILLILITER(S): 9 INJECTION INTRAMUSCULAR; INTRAVENOUS; SUBCUTANEOUS at 20:39

## 2019-12-27 RX ADMIN — Medication 50 GRAM(S): at 20:37

## 2019-12-27 RX ADMIN — Medication 400 MILLIGRAM(S): at 23:06

## 2019-12-27 NOTE — H&P ADULT - HISTORY OF PRESENT ILLNESS
80 y/o male with PMH of throat ca on CT and RT came to the ED complaining of pre-syncope and fall. Patient said he was walking up the stairs and started feeling light headed, he fell forward and hit his head on the right. He called out to his wife who helped him up. Patient then went for his radiation therapy which was completed and later told to come to the ED for evaluation of fall. Patient reported that he has been having decrease PO intake due to the throat ca, recently had a PEG tube placement. He also noted cough sometimes productive of greenish sputum with blood streak occasionally. Patient has no HA, weakness, blurry vision, tongue bite, bowel/bladder incontinence, LOC, chest pain, shortness of breath, fever, chills, palpitation.

## 2019-12-27 NOTE — SEPSIS NOTE - ASSESSMENT
79 year old male with PMH throat cancer on chemo therapy presents with near syncope. Pt is NPO and receives nutrition and fluid through G tube but admits he has had poor fluid and nutrition intake secondary to nausea and loss of appetite. Today he was walking up the stairs when he began to feel lightheaded and as if he was going to pass out and fell backwards, hitting his head. Unknown if he lost consciousness. He states he did receive radiation therapy today.     He reports nasal congestion with easily controlled nose bleeds. He reports cough productive of green sputum with streaks of blood, orthopnea but denies dyspnea at rest. Denies fever/chills prior to today. Denies feeling feverish at present.     ROS: does admit to nausea and nearly vomiting on occasion but he "suppresses it". + dizziness and fall reported above. Denies chest pain, abd pain, back/flank pain, dysuria.                           8.0    3.00  )-----------( 222      ( 27 Dec 2019 17:33 )             23.1   Blood Gas Venous - Lactate: 1.0 mmoL/L (12.27.19 @ 22:48)    12-27    126<L>  |  82<L>  |  34.0<H>  ----------------------------<  111  4.0   |  28.0  |  1.85<H>    A/P  # low grade fever, hypoxia  - certainly at risk for PNA  - cefepime and vanco as ordered.   - CXR personally reviewed no obvious change from 9/2019, no infiltrate/effusion appreciated -awaiting radiology review.   - lactate 1.0  - cont O2  - check RVP    #hyponatremia  - received 1L NS earlier today  - repeat BMP now    #anemia  - 1u PRBC pending for symptomatic anemia    D/W Dr Pearl 79 year old male with PMH throat cancer on chemo therapy presents with near syncope. Pt is NPO and receives nutrition and fluid through G tube but admits he has had poor fluid and nutrition intake secondary to nausea and loss of appetite. Today he was walking up the stairs when he began to feel lightheaded and as if he was going to pass out and fell backwards, hitting his head. Unknown if he lost consciousness. He states he did receive radiation therapy today.     He reports nasal congestion with easily controlled nose bleeds. He reports cough productive of green sputum with streaks of blood, orthopnea but denies dyspnea at rest. Denies fever/chills prior to today. Denies feeling feverish at present.     ROS: does admit to nausea and nearly vomiting on occasion but he "suppresses it". + dizziness and fall reported above. Denies chest pain, abd pain, back/flank pain, dysuria.                           8.0    3.00  )-----------( 222      ( 27 Dec 2019 17:33 )             23.1   Blood Gas Venous - Lactate: 1.0 mmoL/L (12.27.19 @ 22:48)    12-27    126<L>  |  82<L>  |  34.0<H>  ----------------------------<  111  4.0   |  28.0  |  1.85<H>    A/P  # low grade fever, hypoxia  - certainly at risk for PNA  - cefepime and vanco as ordered.   - CXR personally reviewed no obvious change from 9/2019, no infiltrate/effusion appreciated -awaiting radiology review.   - lactate 1.0  - cont O2  - check RVP    #hyponatremia  - received 1L NS earlier today  - repeat BMP now    #anemia  - 1u PRBC pending for symptomatic anemia    D/W with patient at bedside, D/W Dr Pearl

## 2019-12-27 NOTE — ED PROVIDER NOTE - OBJECTIVE STATEMENT
79 year old male with PMH throat cancer on chemo therapy presents with near syncope. pt states that he has had poor PO itnake secondary to his chemotherapy. Today he was walking up the stairs when he began to feel lightheaded and as if he was going to pass out and fell backwards, hitting his head. Unknown if he lost consciousness. he deneis chest pain, palpitations, headache, neck pain, numbness, tingling, weakness, but does endorse feeling fatigued. Denies black/bloody stools.

## 2019-12-27 NOTE — ED PROVIDER NOTE - CARE PLAN
Principal Discharge DX:	Near syncope  Secondary Diagnosis:	Symptomatic anemia  Secondary Diagnosis:	Hyponatremia  Secondary Diagnosis:	Hypomagnesemia

## 2019-12-27 NOTE — ED ADULT TRIAGE NOTE - CHIEF COMPLAINT QUOTE
Fell this morning, hit head, denies LOC or blood thinners, states on chemotherapy for throat CA, sent to ED by MD for eval, abrasion to right side of head

## 2019-12-27 NOTE — H&P ADULT - ASSESSMENT
80 y/o male with PMH of throat ca on CT and RT came to the ED complaining of pre-syncope and fall. Code sepsis called in the ED for fever of 100.7, patient also became hypoxic on RA.     Pre-syncope   Admit to monitor bed   Likely due to low BP vs decrease PO intake   CT head: no acute intracranial finding   IVF given in the ED  Monitor BP    SIRS   Septic work up sent; follow up   No clear source of infection for now   Patient reported cough productive of green sputum; CXR unremarkable   Vancomycin and Cefepime given in the ED   Given that patient is immunocompromised, will continue with Cefepime for now; adjust antibiotic as needed     Fall   CT head as mentioned above   Fall precaution    Hyponatremia   Likely due to decrease PO intake   Na: 126  Gently hydration   Monitor BMP     RAFAEL   Cr: 1.85 baseline 1.4)   Likely due to decrease PO intake vs chemo s/e (Patient s/p 5 cycles of Cisplatin; 6th cycle was held due to cr of 1.70)   Gently hydration   Monitor renal function   Avoid nephrotoxic agent     Hypomagnesemia   M.1   Supplemented in the ED   Continue home dose on MgO 400mg tid     Tonsil ca   S/p chemotherapy and radiation therapy (last RT today)   Follows with Dr. Vidal     Supportive   DVT prophylaxis: Heparin sc (due to RAFAEL, change to Lovenox as needed)  Diet: tube feed   Nutritionist consult in AM

## 2019-12-27 NOTE — ED PROVIDER NOTE - CLINICAL SUMMARY MEDICAL DECISION MAKING FREE TEXT BOX
Pt with dehydration, multiple electrolyte abnormalities, symptomatic anemia. Will admit for syncope, hydration, transfusion

## 2019-12-28 DIAGNOSIS — C09.9 MALIGNANT NEOPLASM OF TONSIL, UNSPECIFIED: ICD-10-CM

## 2019-12-28 LAB
ABO RH CONFIRMATION: SIGNIFICANT CHANGE UP
ANION GAP SERPL CALC-SCNC: 14 MMOL/L — SIGNIFICANT CHANGE UP (ref 5–17)
ANION GAP SERPL CALC-SCNC: 15 MMOL/L — SIGNIFICANT CHANGE UP (ref 5–17)
APPEARANCE UR: CLEAR — SIGNIFICANT CHANGE UP
BACTERIA # UR AUTO: SIGNIFICANT CHANGE UP
BASOPHILS # BLD AUTO: 0.05 K/UL — SIGNIFICANT CHANGE UP (ref 0–0.2)
BASOPHILS NFR BLD AUTO: 1.4 % — SIGNIFICANT CHANGE UP (ref 0–2)
BILIRUB UR-MCNC: ABNORMAL
BUN SERPL-MCNC: 32 MG/DL — HIGH (ref 8–20)
BUN SERPL-MCNC: 34 MG/DL — HIGH (ref 8–20)
CALCIUM SERPL-MCNC: 7.9 MG/DL — LOW (ref 8.6–10.2)
CALCIUM SERPL-MCNC: 8.1 MG/DL — LOW (ref 8.6–10.2)
CHLORIDE SERPL-SCNC: 86 MMOL/L — LOW (ref 98–107)
CHLORIDE SERPL-SCNC: 88 MMOL/L — LOW (ref 98–107)
CO2 SERPL-SCNC: 26 MMOL/L — SIGNIFICANT CHANGE UP (ref 22–29)
CO2 SERPL-SCNC: 27 MMOL/L — SIGNIFICANT CHANGE UP (ref 22–29)
COLOR SPEC: YELLOW — SIGNIFICANT CHANGE UP
COMMENT - URINE: SIGNIFICANT CHANGE UP
CREAT SERPL-MCNC: 1.59 MG/DL — HIGH (ref 0.5–1.3)
CREAT SERPL-MCNC: 1.72 MG/DL — HIGH (ref 0.5–1.3)
DIFF PNL FLD: NEGATIVE — SIGNIFICANT CHANGE UP
EOSINOPHIL # BLD AUTO: 0 K/UL — SIGNIFICANT CHANGE UP (ref 0–0.5)
EOSINOPHIL NFR BLD AUTO: 0 % — SIGNIFICANT CHANGE UP (ref 0–6)
EPI CELLS # UR: NEGATIVE — SIGNIFICANT CHANGE UP
GLUCOSE SERPL-MCNC: 101 MG/DL — SIGNIFICANT CHANGE UP (ref 70–115)
GLUCOSE SERPL-MCNC: 113 MG/DL — SIGNIFICANT CHANGE UP (ref 70–115)
GLUCOSE UR QL: NEGATIVE MG/DL — SIGNIFICANT CHANGE UP
HCT VFR BLD CALC: 24.3 % — LOW (ref 39–50)
HCT VFR BLD CALC: 25.8 % — LOW (ref 39–50)
HGB BLD-MCNC: 8.2 G/DL — LOW (ref 13–17)
HGB BLD-MCNC: 8.7 G/DL — LOW (ref 13–17)
IMM GRANULOCYTES NFR BLD AUTO: 5.1 % — HIGH (ref 0–1.5)
KETONES UR-MCNC: ABNORMAL
LEUKOCYTE ESTERASE UR-ACNC: ABNORMAL
LYMPHOCYTES # BLD AUTO: 0.44 K/UL — LOW (ref 1–3.3)
LYMPHOCYTES # BLD AUTO: 11.9 % — LOW (ref 13–44)
MAGNESIUM SERPL-MCNC: 1.4 MG/DL — LOW (ref 1.6–2.6)
MCHC RBC-ENTMCNC: 29.3 PG — SIGNIFICANT CHANGE UP (ref 27–34)
MCHC RBC-ENTMCNC: 30.2 PG — SIGNIFICANT CHANGE UP (ref 27–34)
MCHC RBC-ENTMCNC: 33.7 GM/DL — SIGNIFICANT CHANGE UP (ref 32–36)
MCHC RBC-ENTMCNC: 33.7 GM/DL — SIGNIFICANT CHANGE UP (ref 32–36)
MCV RBC AUTO: 86.8 FL — SIGNIFICANT CHANGE UP (ref 80–100)
MCV RBC AUTO: 89.6 FL — SIGNIFICANT CHANGE UP (ref 80–100)
MONOCYTES # BLD AUTO: 0.42 K/UL — SIGNIFICANT CHANGE UP (ref 0–0.9)
MONOCYTES NFR BLD AUTO: 11.4 % — SIGNIFICANT CHANGE UP (ref 2–14)
NEUTROPHILS # BLD AUTO: 2.6 K/UL — SIGNIFICANT CHANGE UP (ref 1.8–7.4)
NEUTROPHILS NFR BLD AUTO: 70.2 % — SIGNIFICANT CHANGE UP (ref 43–77)
NITRITE UR-MCNC: NEGATIVE — SIGNIFICANT CHANGE UP
PH UR: 6 — SIGNIFICANT CHANGE UP (ref 5–8)
PLATELET # BLD AUTO: 197 K/UL — SIGNIFICANT CHANGE UP (ref 150–400)
PLATELET # BLD AUTO: 245 K/UL — SIGNIFICANT CHANGE UP (ref 150–400)
POTASSIUM SERPL-MCNC: 3.5 MMOL/L — SIGNIFICANT CHANGE UP (ref 3.5–5.3)
POTASSIUM SERPL-MCNC: 3.5 MMOL/L — SIGNIFICANT CHANGE UP (ref 3.5–5.3)
POTASSIUM SERPL-SCNC: 3.5 MMOL/L — SIGNIFICANT CHANGE UP (ref 3.5–5.3)
POTASSIUM SERPL-SCNC: 3.5 MMOL/L — SIGNIFICANT CHANGE UP (ref 3.5–5.3)
PROT UR-MCNC: 30 MG/DL
RAPID RVP RESULT: DETECTED
RBC # BLD: 2.8 M/UL — LOW (ref 4.2–5.8)
RBC # BLD: 2.88 M/UL — LOW (ref 4.2–5.8)
RBC # FLD: 13.6 % — SIGNIFICANT CHANGE UP (ref 10.3–14.5)
RBC # FLD: 13.8 % — SIGNIFICANT CHANGE UP (ref 10.3–14.5)
RBC CASTS # UR COMP ASSIST: NEGATIVE /HPF — SIGNIFICANT CHANGE UP (ref 0–4)
RV+EV RNA SPEC QL NAA+PROBE: DETECTED
SODIUM SERPL-SCNC: 127 MMOL/L — LOW (ref 135–145)
SODIUM SERPL-SCNC: 129 MMOL/L — LOW (ref 135–145)
SP GR SPEC: 1.02 — SIGNIFICANT CHANGE UP (ref 1.01–1.02)
UROBILINOGEN FLD QL: 4 MG/DL
WBC # BLD: 1.67 K/UL — LOW (ref 3.8–10.5)
WBC # BLD: 3.7 K/UL — LOW (ref 3.8–10.5)
WBC # FLD AUTO: 1.67 K/UL — LOW (ref 3.8–10.5)
WBC # FLD AUTO: 3.7 K/UL — LOW (ref 3.8–10.5)
WBC UR QL: SIGNIFICANT CHANGE UP

## 2019-12-28 PROCEDURE — 99233 SBSQ HOSP IP/OBS HIGH 50: CPT

## 2019-12-28 PROCEDURE — 99223 1ST HOSP IP/OBS HIGH 75: CPT

## 2019-12-28 RX ORDER — SODIUM CHLORIDE 9 MG/ML
1000 INJECTION INTRAMUSCULAR; INTRAVENOUS; SUBCUTANEOUS
Refills: 0 | Status: DISCONTINUED | OUTPATIENT
Start: 2019-12-28 | End: 2019-12-29

## 2019-12-28 RX ORDER — SODIUM CHLORIDE 9 MG/ML
1000 INJECTION INTRAMUSCULAR; INTRAVENOUS; SUBCUTANEOUS
Refills: 0 | Status: DISCONTINUED | OUTPATIENT
Start: 2019-12-27 | End: 2019-12-28

## 2019-12-28 RX ORDER — HEPARIN SODIUM 5000 [USP'U]/ML
5000 INJECTION INTRAVENOUS; SUBCUTANEOUS EVERY 8 HOURS
Refills: 0 | Status: DISCONTINUED | OUTPATIENT
Start: 2019-12-28 | End: 2020-01-03

## 2019-12-28 RX ORDER — ONDANSETRON 8 MG/1
8 TABLET, FILM COATED ORAL EVERY 8 HOURS
Refills: 0 | Status: DISCONTINUED | OUTPATIENT
Start: 2019-12-28 | End: 2020-01-03

## 2019-12-28 RX ORDER — MAGNESIUM SULFATE 500 MG/ML
2 VIAL (ML) INJECTION ONCE
Refills: 0 | Status: COMPLETED | OUTPATIENT
Start: 2019-12-28 | End: 2019-12-28

## 2019-12-28 RX ORDER — MAGNESIUM OXIDE 400 MG ORAL TABLET 241.3 MG
400 TABLET ORAL
Refills: 0 | Status: DISCONTINUED | OUTPATIENT
Start: 2019-12-28 | End: 2019-12-29

## 2019-12-28 RX ORDER — ACETAMINOPHEN 500 MG
650 TABLET ORAL EVERY 6 HOURS
Refills: 0 | Status: DISCONTINUED | OUTPATIENT
Start: 2019-12-28 | End: 2020-01-03

## 2019-12-28 RX ADMIN — HEPARIN SODIUM 5000 UNIT(S): 5000 INJECTION INTRAVENOUS; SUBCUTANEOUS at 05:22

## 2019-12-28 RX ADMIN — SODIUM CHLORIDE 100 MILLILITER(S): 9 INJECTION INTRAMUSCULAR; INTRAVENOUS; SUBCUTANEOUS at 20:41

## 2019-12-28 RX ADMIN — Medication 50 GRAM(S): at 15:46

## 2019-12-28 RX ADMIN — HEPARIN SODIUM 5000 UNIT(S): 5000 INJECTION INTRAVENOUS; SUBCUTANEOUS at 13:32

## 2019-12-28 RX ADMIN — HEPARIN SODIUM 5000 UNIT(S): 5000 INJECTION INTRAVENOUS; SUBCUTANEOUS at 20:38

## 2019-12-28 RX ADMIN — SODIUM CHLORIDE 100 MILLILITER(S): 9 INJECTION INTRAMUSCULAR; INTRAVENOUS; SUBCUTANEOUS at 08:20

## 2019-12-28 RX ADMIN — CEFEPIME 100 MILLIGRAM(S): 1 INJECTION, POWDER, FOR SOLUTION INTRAMUSCULAR; INTRAVENOUS at 05:21

## 2019-12-28 RX ADMIN — SODIUM CHLORIDE 100 MILLILITER(S): 9 INJECTION INTRAMUSCULAR; INTRAVENOUS; SUBCUTANEOUS at 05:22

## 2019-12-28 RX ADMIN — CEFEPIME 100 MILLIGRAM(S): 1 INJECTION, POWDER, FOR SOLUTION INTRAMUSCULAR; INTRAVENOUS at 16:35

## 2019-12-28 NOTE — CONSULT NOTE ADULT - ASSESSMENT
80 y/o male with PMH of throat ca on CT and RT came to the ED complaining of pre-syncope and fall. Patient said he was walking up the stairs and started feeling light headed, he fell forward and hit his head on the right. He called out to his wife who helped him up. Patient then went for his radiation therapy which was completed and later told to come to the ED for evaluation of fall. Patient reported that he has been having decrease PO intake due to the throat ca, recently had a PEG tube placement. He also noted cough sometimes productive of greenish sputum with blood streak occasionally.    Overall throat Ca on chemotherapy, fever, leukopenia, viral URI, hyponatremia  - febrile to 100.7 F, with leukopenia but not neutropenic  - Blood cultures   - RVP + entero/rhino  - Continue Cefepime 2 g IV q12h  adjusted for renal function  - CT chest  - Sputum CX and urine legionella  - Trend Fever  - Trend Leukocytosis      Will Follow 78 y/o male with PMH of throat ca on CT and RT came to the ED complaining of pre-syncope and fall. Patient said he was walking up the stairs and started feeling light headed, he fell forward and hit his head on the right. He called out to his wife who helped him up. Patient then went for his radiation therapy which was completed and later told to come to the ED for evaluation of fall. Patient reported that he has been having decrease PO intake due to the throat ca, recently had a PEG tube placement. He also noted cough sometimes productive of greenish sputum with blood streak occasionally.    Overall throat Ca on chemotherapy, fever, leukopenia, viral URI, hyponatremia  - febrile to 100.7 F, with leukopenia but not neutropenic ?aspiration   - Blood cultures   - RVP + entero/rhino  - Continue Cefepime 2 g IV q12h  adjusted for renal function  - CT chest  - Sputum CX and urine legionella  - Trend Fever  - Trend Leukocytosis      Will Follow

## 2019-12-28 NOTE — CONSULT NOTE ADULT - ASSESSMENT
79F diagnosed with left tonsillar cancer, diagnosed in October 2019, s/p 5 cycles of weekly Cisplatin/ XRT, who sustained a fall/ head trauma. Patient is s/p peg placement recently. Last seen in oncologist's  ( Dr. Vidal) 's office on 12/23/19, referred to ED at [facility]. CT head 12/27/19 unremarkable. CT and PET done in October 22, and 2 respectively consistent with large left tonsilar soft tissue mass ( SUV 52.5) extending to soft palate, nasopharynx, lateral oropharyngeal wall, and multiple FDG- avid left level II cervical lymph nodes. No other reported constitutional symptoms.

## 2019-12-28 NOTE — SEPSIS NOTE - ASSESSMENT
SIRS  - Lactate 1.0  - afebrile at present, oxygenating well on nasal cannula  - BC x2 pending  - continue cefepime empirically (no florastor - chemotherapy)  - RVP pending    #hyponatremia  - repeat BMP pending

## 2019-12-28 NOTE — CONSULT NOTE ADULT - SUBJECTIVE AND OBJECTIVE BOX
[facility]  INFECTIOUS DISEASES AND INTERNAL MEDICINE at [place]  =======================================================  Erasmo Mitchell MD  Diplomates American Board of Internal Medicine and Infectious Diseases  Tel: [phone]      Fax: [phone]  =======================================================      [facility]-[id number]  LINDSAY BRADLEY is a 79y  Male     CC: Patient is a 79y old  Male who presents with a chief complaint of Pre-syncope (27 Dec 2019 23:56)    HPI:  78 y/o male with PMH of throat ca on CT and RT came to the ED complaining of pre-syncope and fall. Patient said he was walking up the stairs and started feeling light headed, he fell forward and hit his head on the right. He called out to his wife who helped him up. Patient then went for his radiation therapy which was completed and later told to come to the ED for evaluation of fall. Patient reported that he has been having decrease PO intake due to the throat ca, recently had a PEG tube placement. He also noted cough sometimes productive of greenish sputum with blood streak occasionally. Patient has no HA, weakness, blurry vision, tongue bite, bowel/bladder incontinence, LOC, chest pain, shortness of breath, fever, chills, palpitation. (27 Dec 2019 23:56)      PAST MEDICAL & SURGICAL HISTORY:  Malignant neoplasm of tonsil  S/P cataract surgery: bilateral eyes  S/P laparoscopic cholecystectomy      Social Hx:    FAMILY HISTORY:  FH: Alzheimers disease      Allergies    No Known Allergies    Intolerances        MEDICATIONS  (STANDING):  cefepime   IVPB 2000 milliGRAM(s) IV Intermittent every 12 hours  heparin  Injectable 5000 Unit(s) SubCutaneous every 8 hours  magnesium oxide 400 milliGRAM(s) Oral three times a day with meals  sodium chloride 0.9%. 1000 milliLiter(s) (100 mL/Hr) IV Continuous <Continuous>    MEDICATIONS  (PRN):  acetaminophen   Tablet .. 650 milliGRAM(s) Oral every 6 hours PRN Temp greater or equal to 38C (100.4F), Mild Pain (1 - 3)  ondansetron    Tablet 8 milliGRAM(s) Oral every 8 hours PRN Nausea and/or Vomiting      ANTIMICROBIALS:  cefepime   IVPB 2000 every 12 hours      OTHER MEDS: MEDICATIONS  (STANDING):  acetaminophen   Tablet .. 650 every 6 hours PRN  heparin  Injectable 5000 every 8 hours  ondansetron    Tablet 8 every 8 hours PRN             REVIEW OF SYSTEMS:  CONSTITUTIONAL:  No Fever or chills  HEENT:  No diplopia or blurred vision.  No earache, sore throat or runny nose.  CARDIOVASCULAR:  No pressure, squeezing, strangling, tightness, heaviness or aching about the chest, neck, axilla or epigastrium.  RESPIRATORY:  No cough, shortness of breath  GASTROINTESTINAL:  No nausea, vomiting or diarrhea.  GENITOURINARY:  No dysuria, frequency or urgency. No Blood in urine  MUSCULOSKELETAL:  no joint aches, no muscle pain  SKIN:  No change in skin, hair or nails.  NEUROLOGIC:  No Headaches, seizures or weakness.  PSYCHIATRIC:  No disorder of thought or mood.  ENDOCRINE:  No heat or cold intolerance  HEMATOLOGICAL:  No easy bruising or bleeding.           I&O's Detail        Physical Exam:  Vital Signs Last 24 Hrs  T(C): 36.9 (28 Dec 2019 11:43), Max: 38.2 (27 Dec 2019 22:24)  T(F): 98.4 (28 Dec 2019 11:43), Max: 100.7 (27 Dec 2019 22:24)  HR: 84 (28 Dec 2019 11:43) (81 - 103)  BP: 123/70 (28 Dec 2019 11:43) (90/54 - 127/70)  BP(mean): 69 (28 Dec 2019 00:52) (69 - 89)  RR: 18 (28 Dec 2019 11:43) (18 - 20)  SpO2: 100% (28 Dec 2019 11:43) (83% - 100%)  Height (cm): 182.88 ( @ 14:57)  Weight (kg): 83.5 ( @ 14:57)  BMI (kg/m2): 25 ( @ 14:57)  BSA (m2): 2.06 ( @ 14:57)  GEN: NAD, pleasant  HEENT: normocephalic and atraumatic. EOMI. PERRL.  Anicteric  NECK: Supple.   LUNGS: Clear to auscultation.  HEART: Regular rate and rhythm without murmur.  ABDOMEN: Soft, nontender, and nondistended.  Positive bowel sounds.    : No CVA tenderness  EXTREMITIES: Without any edema.  MSK: No joint swelling  NEUROLOGIC: Cranial nerves II through XII are grossly intact. No Focal Deficits  PSYCHIATRIC: Appropriate affect .  SKIN: No Rash        Labs:      129<L>  |  88<L>  |  32.0<H>  ----------------------------<  101  3.5   |  26.0  |  1.59<H>    Ca    8.1<L>      28 Dec 2019 07:32  Mg     1.4         TPro  6.3<L>  /  Alb  3.2<L>  /  TBili  0.9  /  DBili  x   /  AST  29  /  ALT  24  /  AlkPhos  121<H>                            8.7    x     )-----------( 197      ( 28 Dec 2019 11:56 )             25.8       PT/INR - ( 27 Dec 2019 17:33 )   PT: 16.3 sec;   INR: 1.40 ratio         PTT - ( 27 Dec 2019 17:33 )  PTT:29.1 sec  Urinalysis Basic - ( 28 Dec 2019 11:53 )    Color: Yellow / Appearance: Clear / S.020 / pH: x  Gluc: x / Ketone: Trace  / Bili: Small / Urobili: 4 mg/dL   Blood: x / Protein: 30 mg/dL / Nitrite: Negative   Leuk Esterase: Trace / RBC: x / WBC x   Sq Epi: x / Non Sq Epi: x / Bacteria: x      LIVER FUNCTIONS - ( 27 Dec 2019 17:33 )  Alb: 3.2 g/dL / Pro: 6.3 g/dL / ALK PHOS: 121 U/L / ALT: 24 U/L / AST: 29 U/L / GGT: x           CARDIAC MARKERS ( 27 Dec 2019 17:33 )  x     / 0.01 ng/mL / x     / x     / x          CAPILLARY BLOOD GLUCOSE            RECENT CULTURES:  12-27 @ 23:55          Detected        Radiology:  < from: Xray Chest 1 View- PORTABLE-Urgent (19 @ 22:25) >  FINDINGS:    Single frontal view of the chest demonstrates the lungs to be clear. The cardiomediastinal silhouette is normal. No acute osseous abnormalities. Overlying EKG leads and wires are noted    IMPRESSION: No acute cardiopulmonary disease process.      < end of copied text > [facility]  INFECTIOUS DISEASES AND INTERNAL MEDICINE at [place]  =======================================================  Erasmo Mitchell MD  Diplomates American Board of Internal Medicine and Infectious Diseases  Tel: [phone]      Fax: [phone]  =======================================================      [facility]-[id number]  LINDSAY BRADLEY is a 79y  Male     CC: Patient is a 79y old  Male who presents with a chief complaint of Pre-syncope (27 Dec 2019 23:56)    HPI:  78 y/o male with PMH of throat ca on CT and RT came to the ED complaining of pre-syncope and fall. Patient said he was walking up the stairs and started feeling light headed, he fell forward and hit his head on the right. He called out to his wife who helped him up. Patient then went for his radiation therapy which was completed and later told to come to the ED for evaluation of fall. Patient reported that he has been having decrease PO intake due to the throat ca, recently had a PEG tube placement. He also noted cough sometimes productive of greenish sputum with blood streak occasionally. Patient has no HA, weakness, blurry vision, tongue bite, bowel/bladder incontinence, LOC, chest pain, shortness of breath, fever, chills, palpitation. (27 Dec 2019 23:56)      PAST MEDICAL & SURGICAL HISTORY:  Malignant neoplasm of tonsil  S/P cataract surgery: bilateral eyes  S/P laparoscopic cholecystectomy      Social Hx: fomer smoker    FAMILY HISTORY:  FH: Alzheimers disease      Allergies    No Known Allergies    Intolerances        MEDICATIONS  (STANDING):  cefepime   IVPB 2000 milliGRAM(s) IV Intermittent every 12 hours  heparin  Injectable 5000 Unit(s) SubCutaneous every 8 hours  magnesium oxide 400 milliGRAM(s) Oral three times a day with meals  sodium chloride 0.9%. 1000 milliLiter(s) (100 mL/Hr) IV Continuous <Continuous>    MEDICATIONS  (PRN):  acetaminophen   Tablet .. 650 milliGRAM(s) Oral every 6 hours PRN Temp greater or equal to 38C (100.4F), Mild Pain (1 - 3)  ondansetron    Tablet 8 milliGRAM(s) Oral every 8 hours PRN Nausea and/or Vomiting      ANTIMICROBIALS:  cefepime   IVPB 2000 every 12 hours      OTHER MEDS: MEDICATIONS  (STANDING):  acetaminophen   Tablet .. 650 every 6 hours PRN  heparin  Injectable 5000 every 8 hours  ondansetron    Tablet 8 every 8 hours PRN             REVIEW OF SYSTEMS:  CONSTITUTIONAL:  No Fever or chills  HEENT:  No diplopia or blurred vision.  No earache, sore throat or runny nose.  CARDIOVASCULAR:  No pressure, squeezing, strangling, tightness, heaviness or aching about the chest, neck, axilla or epigastrium.  RESPIRATORY:  + cough, shortness of breath  GASTROINTESTINAL:  No nausea, vomiting or diarrhea.  GENITOURINARY:  No dysuria, frequency or urgency. No Blood in urine  MUSCULOSKELETAL:  no joint aches, no muscle pain  SKIN:  No change in skin, hair or nails.  NEUROLOGIC:  No Headaches, seizures or weakness.  PSYCHIATRIC:  No disorder of thought or mood.  ENDOCRINE:  No heat or cold intolerance  HEMATOLOGICAL:  No easy bruising or bleeding.           I&O's Detail        Physical Exam:  Vital Signs Last 24 Hrs  T(C): 36.9 (28 Dec 2019 11:43), Max: 38.2 (27 Dec 2019 22:24)  T(F): 98.4 (28 Dec 2019 11:43), Max: 100.7 (27 Dec 2019 22:24)  HR: 84 (28 Dec 2019 11:43) (81 - 103)  BP: 123/70 (28 Dec 2019 11:43) (90/54 - 127/70)  BP(mean): 69 (28 Dec 2019 00:52) (69 - 89)  RR: 18 (28 Dec 2019 11:43) (18 - 20)  SpO2: 100% (28 Dec 2019 11:43) (83% - 100%)  Height (cm): 182.88 ( @ 14:57)  Weight (kg): 83.5 ( @ 14:57)  BMI (kg/m2): 25 ( @ 14:57)  BSA (m2): 2.06 ( @ 14:57)  GEN: NAD, pleasant right scalp laceration  HEENT: normocephalic and atraumatic. EOMI. PERRL.  Anicteric  NECK: Supple.   LUNGS: Clear to auscultation.  HEART: Regular rate and rhythm without murmur.  ABDOMEN: Soft, nontender, and nondistended.  Positive bowel sounds.    : No CVA tenderness  EXTREMITIES: Without any edema.  MSK: No joint swelling  NEUROLOGIC: Cranial nerves II through XII are grossly intact. No Focal Deficits  PSYCHIATRIC: Appropriate affect .  SKIN: No Rash        Labs:      129<L>  |  88<L>  |  32.0<H>  ----------------------------<  101  3.5   |  26.0  |  1.59<H>    Ca    8.1<L>      28 Dec 2019 07:32  Mg     1.4         TPro  6.3<L>  /  Alb  3.2<L>  /  TBili  0.9  /  DBili  x   /  AST  29  /  ALT  24  /  AlkPhos  121<H>                            8.7    x     )-----------( 197      ( 28 Dec 2019 11:56 )             25.8       PT/INR - ( 27 Dec 2019 17:33 )   PT: 16.3 sec;   INR: 1.40 ratio         PTT - ( 27 Dec 2019 17:33 )  PTT:29.1 sec  Urinalysis Basic - ( 28 Dec 2019 11:53 )    Color: Yellow / Appearance: Clear / S.020 / pH: x  Gluc: x / Ketone: Trace  / Bili: Small / Urobili: 4 mg/dL   Blood: x / Protein: 30 mg/dL / Nitrite: Negative   Leuk Esterase: Trace / RBC: x / WBC x   Sq Epi: x / Non Sq Epi: x / Bacteria: x      LIVER FUNCTIONS - ( 27 Dec 2019 17:33 )  Alb: 3.2 g/dL / Pro: 6.3 g/dL / ALK PHOS: 121 U/L / ALT: 24 U/L / AST: 29 U/L / GGT: x           CARDIAC MARKERS ( 27 Dec 2019 17:33 )  x     / 0.01 ng/mL / x     / x     / x          CAPILLARY BLOOD GLUCOSE            RECENT CULTURES:   @ 23:55          Detected        Radiology:  < from: Xray Chest 1 View- PORTABLE-Urgent (19 @ 22:25) >  FINDINGS:    Single frontal view of the chest demonstrates the lungs to be clear. The cardiomediastinal silhouette is normal. No acute osseous abnormalities. Overlying EKG leads and wires are noted    IMPRESSION: No acute cardiopulmonary disease process.      < end of copied text > [facility]  INFECTIOUS DISEASES AND INTERNAL MEDICINE at [place]  =======================================================  Erasmo Mitchell MD  Diplomates American Board of Internal Medicine and Infectious Diseases  Tel: [phone]      Fax: [phone]  =======================================================      [facility]-[id number]  LINDSAY BRADLEY is a 79y  Male     CC: Patient is a 79y old  Male who presents with a chief complaint of Pre-syncope (27 Dec 2019 23:56)    HPI:  78 y/o male with PMH of throat ca on CT and RT came to the ED complaining of pre-syncope and fall. Patient said he was walking up the stairs and started feeling light headed, he fell forward and hit his head on the right. He called out to his wife who helped him up. Patient then went for his radiation therapy which was completed and later told to come to the ED for evaluation of fall. Patient reported that he has been having decrease PO intake due to the throat ca, recently had a PEG tube placement. He also noted cough sometimes productive of greenish sputum with blood streak occasionally. Patient has no HA, weakness, blurry vision, tongue bite, bowel/bladder incontinence, LOC, chest pain, shortness of breath, fever, chills, palpitation. (27 Dec 2019 23:56)  Has PEG tube since 11/15  currently undergoing chemo last was 2  ago  also on radiation, Last radiation treatment 19  able to eat very little by mouth, now mostly through PEG          PAST MEDICAL & SURGICAL HISTORY:  Malignant neoplasm of tonsil  S/P cataract surgery: bilateral eyes  S/P laparoscopic cholecystectomy      Social Hx: fomer smoker    FAMILY HISTORY:  FH: Alzheimers disease      Allergies    No Known Allergies    Intolerances        MEDICATIONS  (STANDING):  cefepime   IVPB 2000 milliGRAM(s) IV Intermittent every 12 hours  heparin  Injectable 5000 Unit(s) SubCutaneous every 8 hours  magnesium oxide 400 milliGRAM(s) Oral three times a day with meals  sodium chloride 0.9%. 1000 milliLiter(s) (100 mL/Hr) IV Continuous <Continuous>    MEDICATIONS  (PRN):  acetaminophen   Tablet .. 650 milliGRAM(s) Oral every 6 hours PRN Temp greater or equal to 38C (100.4F), Mild Pain (1 - 3)  ondansetron    Tablet 8 milliGRAM(s) Oral every 8 hours PRN Nausea and/or Vomiting      ANTIMICROBIALS:  cefepime   IVPB 2000 every 12 hours      OTHER MEDS: MEDICATIONS  (STANDING):  acetaminophen   Tablet .. 650 every 6 hours PRN  heparin  Injectable 5000 every 8 hours  ondansetron    Tablet 8 every 8 hours PRN             REVIEW OF SYSTEMS:  CONSTITUTIONAL:  No Fever or chills  HEENT:  No diplopia or blurred vision.  No earache, sore throat or runny nose.  CARDIOVASCULAR:  No pressure, squeezing, strangling, tightness, heaviness or aching about the chest, neck, axilla or epigastrium.  RESPIRATORY:  + cough, shortness of breath  GASTROINTESTINAL:  No nausea, vomiting or diarrhea.  GENITOURINARY:  No dysuria, frequency or urgency. No Blood in urine  MUSCULOSKELETAL:  no joint aches, no muscle pain  SKIN:  No change in skin, hair or nails.  NEUROLOGIC:  No Headaches, seizures or weakness.  PSYCHIATRIC:  No disorder of thought or mood.  ENDOCRINE:  No heat or cold intolerance  HEMATOLOGICAL:  No easy bruising or bleeding.           I&O's Detail        Physical Exam:  Vital Signs Last 24 Hrs  T(C): 36.9 (28 Dec 2019 11:43), Max: 38.2 (27 Dec 2019 22:24)  T(F): 98.4 (28 Dec 2019 11:43), Max: 100.7 (27 Dec 2019 22:24)  HR: 84 (28 Dec 2019 11:43) (81 - 103)  BP: 123/70 (28 Dec 2019 11:43) (90/54 - 127/70)  BP(mean): 69 (28 Dec 2019 00:52) (69 - 89)  RR: 18 (28 Dec 2019 11:43) (18 - 20)  SpO2: 100% (28 Dec 2019 11:43) (83% - 100%)  Height (cm): 182.88 ( @ 14:57)  Weight (kg): 83.5 ( @ 14:57)  BMI (kg/m2): 25 ( @ 14:57)  BSA (m2): 2.06 ( @ 14:57)  GEN: NAD, pleasant right scalp laceration  HEENT: normocephalic and atraumatic. EOMI. PERRL.  Anicteric  NECK: Supple.   LUNGS: Clear to auscultation.  HEART: Regular rate and rhythm without murmur.  ABDOMEN: Soft, nontender, and nondistended.  Positive bowel sounds.    : No CVA tenderness  EXTREMITIES: Without any edema.  MSK: No joint swelling  NEUROLOGIC: Cranial nerves II through XII are grossly intact. No Focal Deficits  PSYCHIATRIC: Appropriate affect .  SKIN: No Rash        Labs:      129<L>  |  88<L>  |  32.0<H>  ----------------------------<  101  3.5   |  26.0  |  1.59<H>    Ca    8.1<L>      28 Dec 2019 07:32  Mg     1.4         TPro  6.3<L>  /  Alb  3.2<L>  /  TBili  0.9  /  DBili  x   /  AST  29  /  ALT  24  /  AlkPhos  121<H>                            8.7    x     )-----------( 197      ( 28 Dec 2019 11:56 )             25.8       PT/INR - ( 27 Dec 2019 17:33 )   PT: 16.3 sec;   INR: 1.40 ratio         PTT - ( 27 Dec 2019 17:33 )  PTT:29.1 sec  Urinalysis Basic - ( 28 Dec 2019 11:53 )    Color: Yellow / Appearance: Clear / S.020 / pH: x  Gluc: x / Ketone: Trace  / Bili: Small / Urobili: 4 mg/dL   Blood: x / Protein: 30 mg/dL / Nitrite: Negative   Leuk Esterase: Trace / RBC: x / WBC x   Sq Epi: x / Non Sq Epi: x / Bacteria: x      LIVER FUNCTIONS - ( 27 Dec 2019 17:33 )  Alb: 3.2 g/dL / Pro: 6.3 g/dL / ALK PHOS: 121 U/L / ALT: 24 U/L / AST: 29 U/L / GGT: x           CARDIAC MARKERS ( 27 Dec 2019 17:33 )  x     / 0.01 ng/mL / x     / x     / x          CAPILLARY BLOOD GLUCOSE            RECENT CULTURES:   @ 23:55          Detected        Radiology:  < from: Xray Chest 1 View- PORTABLE-Urgent (19 @ 22:25) >  FINDINGS:    Single frontal view of the chest demonstrates the lungs to be clear. The cardiomediastinal silhouette is normal. No acute osseous abnormalities. Overlying EKG leads and wires are noted    IMPRESSION: No acute cardiopulmonary disease process.      < end of copied text >

## 2019-12-28 NOTE — SEPSIS NOTE - RS GEN PE MLT RESP DETAILS PC
breath sounds equal/good air movement/clear to auscultation bilaterally
good air movement/clear to auscultation bilaterally/breath sounds equal/respirations non-labored

## 2019-12-28 NOTE — SEPSIS NOTE - ADDITIONAL PE
Neuro: A+Ox3
HEENT: MMM, no cyanosis, conjunctiva normal  Neck: No JVD, trachea midline, no adenopathy. Skin dry/flaking/erythematous  Abd: +BS, soft, nontender, nondistended. G-tube in place w no surrounding erythema/edema, no suprapubic tenderness.  Back: no CVA tenderness  Neuro: A+O x3

## 2019-12-28 NOTE — CONSULT NOTE ADULT - SUBJECTIVE AND OBJECTIVE BOX
LINDSAY BRADLEY,  79y Male  MRN: [id number]  ATTENDING: Dr. Amanda Amaral      HPI:  79F diagnosed with left tonsillar cancer, diagnosed in October 2019, s/p 5 cycles of weekly Cisplatin/ XRT, who sustained a fall/ head trauma. Patient is s/p peg placement recently. Last seen in oncologist's  ( Dr. Vidal) 's office on 12/23/19, referred to ED at [facility]. CT head 12/27/19 unremarkable. CT and PET done in October 22, and 2 respectively consistent with large left tonsilar soft tissue mass ( SUV 52.5) extending to soft palate, nasopharynx, lateral oropharyngeal wall, and multiple FDG- avid left level II cervical lymph nodes. No other reported constitutional symptoms.    PAST MEDICAL & SURGICAL HISTORY:  Malignant neoplasm of tonsil  S/P cataract surgery: bilateral eyes  S/P laparoscopic cholecystectomy    MEDICATION:  cefepime   IVPB 2000 milliGRAM(s) IV Intermittent every 12 hours  heparin  Injectable 5000 Unit(s) SubCutaneous every 8 hours  magnesium oxide 400 milliGRAM(s) Oral three times a day with meals  sodium chloride 0.9%. 1000 milliLiter(s) IV Continuous <Continuous>    ALLERGIES:  No Known Allergies    FAMILY HISTORY:  Reviewed, non-contributory: [ x]     SOCIAL HISTORY:  Tobacco: YES [ ]  ; NO [ ]; Former smoker [ x ]  Alcohol:   YES [ ]  ; NO [  x]; Social alcohol user [ ]    REVIEW SYSTEMS:  Constitutional: no fever, + weight loss  HEENT: no dysphagia  Respiratory: no dyspnea ,  no wheezing, no cough  Cardiovascular: denies chest pain, palpitations  GI: no abdominal tenderness / pain; no change in bowel habits  Musculoskeletal: joint pain / swelling  Integumentary: denies pruritus; no skin lesions  Neurologic: grossly non-focal    VITALS:  T(C): 36.9, Max: 38.2 (12-27-19 @ 22:24)  T(F): 98.4, Max: 100.7 (12-27-19 @ 22:24)  HR: 93 (81 - 103)  BP: 139/68 (102/57 - 139/68)  SpO2: 98% (83% - 100%)    PHYSICAL EXAM:  Constitutional: Alert and oriented x 3  Eyes/ ENMT: PERRLA,   Respiratory: clear to auscultation bilaterally  Cardiovascular: S1,S2 rhythmic  Gastrointestinal: no guarding/ rebound  Extremities: no calf tenderness;  peripheral edema  Skin: dry, rash  Musculoskeletal: normal muscle tone    LABS:  (12-28) WBC: 3.70 K/uL,Hemoglobin: 8.7 g/dL, Hematocrit: 25.8 %,  Platelet: 197 K/uL  (12-28) Na: 129 mmol/L ; K: 3.5 mmol/L ; BUN: 32.0 mg/dL ; Cr: 1.59 mg/dL.  PT/INR - ( 27 Dec 2019 17:33 )   PT: 16.3 sec;   INR: 1.40 ratio    PTT - ( 27 Dec 2019 17:33 )  PTT:29.1 sec    RADIOLOGY:  CT Head No Cont (12.27.19 @ 20:17)   Impression:  1. Unremarkable noncontrast CT scan of the brain.

## 2019-12-29 LAB
-  CANDIDA ALBICANS: SIGNIFICANT CHANGE UP
-  CANDIDA GLABRATA: SIGNIFICANT CHANGE UP
-  CANDIDA KRUSEI: SIGNIFICANT CHANGE UP
-  CANDIDA PARAPSILOSIS: SIGNIFICANT CHANGE UP
-  CANDIDA TROPICALIS: SIGNIFICANT CHANGE UP
-  COAGULASE NEGATIVE STAPHYLOCOCCUS: SIGNIFICANT CHANGE UP
-  K. PNEUMONIAE GROUP: SIGNIFICANT CHANGE UP
-  KPC RESISTANCE GENE: SIGNIFICANT CHANGE UP
-  STREPTOCOCCUS SP. (NOT GRP A, B OR S PNEUMONIAE): SIGNIFICANT CHANGE UP
A BAUMANNII DNA SPEC QL NAA+PROBE: SIGNIFICANT CHANGE UP
ANION GAP SERPL CALC-SCNC: 14 MMOL/L — SIGNIFICANT CHANGE UP (ref 5–17)
BUN SERPL-MCNC: 30 MG/DL — HIGH (ref 8–20)
CALCIUM SERPL-MCNC: 8.3 MG/DL — LOW (ref 8.6–10.2)
CHLORIDE SERPL-SCNC: 94 MMOL/L — LOW (ref 98–107)
CO2 SERPL-SCNC: 26 MMOL/L — SIGNIFICANT CHANGE UP (ref 22–29)
CREAT SERPL-MCNC: 1.29 MG/DL — SIGNIFICANT CHANGE UP (ref 0.5–1.3)
E CLOAC COMP DNA BLD POS QL NAA+PROBE: SIGNIFICANT CHANGE UP
E COLI DNA BLD POS QL NAA+NON-PROBE: SIGNIFICANT CHANGE UP
ENTEROCOC DNA BLD POS QL NAA+NON-PROBE: SIGNIFICANT CHANGE UP
ENTEROCOC DNA BLD POS QL NAA+NON-PROBE: SIGNIFICANT CHANGE UP
GLUCOSE SERPL-MCNC: 133 MG/DL — HIGH (ref 70–115)
GP B STREP DNA BLD POS QL NAA+NON-PROBE: SIGNIFICANT CHANGE UP
HAEM INFLU DNA BLD POS QL NAA+NON-PROBE: SIGNIFICANT CHANGE UP
HCT VFR BLD CALC: 21.4 % — LOW (ref 39–50)
HGB BLD-MCNC: 7.4 G/DL — LOW (ref 13–17)
K OXYTOCA DNA BLD POS QL NAA+NON-PROBE: SIGNIFICANT CHANGE UP
L MONOCYTOG DNA BLD POS QL NAA+NON-PROBE: SIGNIFICANT CHANGE UP
LEGIONELLA AG UR QL: NEGATIVE — SIGNIFICANT CHANGE UP
MCHC RBC-ENTMCNC: 30.1 PG — SIGNIFICANT CHANGE UP (ref 27–34)
MCHC RBC-ENTMCNC: 34.6 GM/DL — SIGNIFICANT CHANGE UP (ref 32–36)
MCV RBC AUTO: 87 FL — SIGNIFICANT CHANGE UP (ref 80–100)
METHOD TYPE: SIGNIFICANT CHANGE UP
MRSA SPEC QL CULT: SIGNIFICANT CHANGE UP
MSSA DNA SPEC QL NAA+PROBE: SIGNIFICANT CHANGE UP
N MEN ISLT CULT: SIGNIFICANT CHANGE UP
P AERUGINOSA DNA BLD POS NAA+NON-PROBE: SIGNIFICANT CHANGE UP
PLATELET # BLD AUTO: 252 K/UL — SIGNIFICANT CHANGE UP (ref 150–400)
POTASSIUM SERPL-MCNC: 3.3 MMOL/L — LOW (ref 3.5–5.3)
POTASSIUM SERPL-SCNC: 3.3 MMOL/L — LOW (ref 3.5–5.3)
RBC # BLD: 2.46 M/UL — LOW (ref 4.2–5.8)
RBC # FLD: 13.7 % — SIGNIFICANT CHANGE UP (ref 10.3–14.5)
S MARCESCENS DNA BLD POS NAA+NON-PROBE: SIGNIFICANT CHANGE UP
S PNEUM DNA BLD POS QL NAA+NON-PROBE: SIGNIFICANT CHANGE UP
S PYO DNA BLD POS QL NAA+NON-PROBE: SIGNIFICANT CHANGE UP
SODIUM SERPL-SCNC: 134 MMOL/L — LOW (ref 135–145)
WBC # BLD: 2.95 K/UL — LOW (ref 3.8–10.5)
WBC # FLD AUTO: 2.95 K/UL — LOW (ref 3.8–10.5)

## 2019-12-29 PROCEDURE — 99232 SBSQ HOSP IP/OBS MODERATE 35: CPT

## 2019-12-29 PROCEDURE — 99233 SBSQ HOSP IP/OBS HIGH 50: CPT

## 2019-12-29 RX ORDER — SODIUM CHLORIDE 9 MG/ML
1000 INJECTION INTRAMUSCULAR; INTRAVENOUS; SUBCUTANEOUS
Refills: 0 | Status: DISCONTINUED | OUTPATIENT
Start: 2019-12-29 | End: 2019-12-30

## 2019-12-29 RX ORDER — MAGNESIUM OXIDE 400 MG ORAL TABLET 241.3 MG
400 TABLET ORAL
Refills: 0 | Status: DISCONTINUED | OUTPATIENT
Start: 2019-12-29 | End: 2020-01-03

## 2019-12-29 RX ORDER — MAGNESIUM OXIDE 400 MG ORAL TABLET 241.3 MG
400 TABLET ORAL
Refills: 0 | Status: DISCONTINUED | OUTPATIENT
Start: 2019-12-29 | End: 2019-12-29

## 2019-12-29 RX ORDER — SODIUM CHLORIDE 9 MG/ML
4 INJECTION INTRAMUSCULAR; INTRAVENOUS; SUBCUTANEOUS THREE TIMES A DAY
Refills: 0 | Status: COMPLETED | OUTPATIENT
Start: 2019-12-29 | End: 2019-12-31

## 2019-12-29 RX ORDER — POTASSIUM CHLORIDE 20 MEQ
10 PACKET (EA) ORAL
Refills: 0 | Status: COMPLETED | OUTPATIENT
Start: 2019-12-29 | End: 2019-12-29

## 2019-12-29 RX ORDER — VANCOMYCIN HCL 1 G
1000 VIAL (EA) INTRAVENOUS EVERY 12 HOURS
Refills: 0 | Status: DISCONTINUED | OUTPATIENT
Start: 2019-12-29 | End: 2019-12-31

## 2019-12-29 RX ADMIN — Medication 100 MILLIEQUIVALENT(S): at 11:01

## 2019-12-29 RX ADMIN — HEPARIN SODIUM 5000 UNIT(S): 5000 INJECTION INTRAVENOUS; SUBCUTANEOUS at 22:21

## 2019-12-29 RX ADMIN — SODIUM CHLORIDE 4 MILLILITER(S): 9 INJECTION INTRAMUSCULAR; INTRAVENOUS; SUBCUTANEOUS at 15:11

## 2019-12-29 RX ADMIN — Medication 100 MILLIEQUIVALENT(S): at 08:45

## 2019-12-29 RX ADMIN — CEFEPIME 100 MILLIGRAM(S): 1 INJECTION, POWDER, FOR SOLUTION INTRAMUSCULAR; INTRAVENOUS at 17:35

## 2019-12-29 RX ADMIN — CEFEPIME 100 MILLIGRAM(S): 1 INJECTION, POWDER, FOR SOLUTION INTRAMUSCULAR; INTRAVENOUS at 05:09

## 2019-12-29 RX ADMIN — Medication 100 MILLIGRAM(S): at 17:36

## 2019-12-29 RX ADMIN — Medication 250 MILLIGRAM(S): at 17:35

## 2019-12-29 RX ADMIN — Medication 100 MILLIEQUIVALENT(S): at 09:55

## 2019-12-29 RX ADMIN — HEPARIN SODIUM 5000 UNIT(S): 5000 INJECTION INTRAVENOUS; SUBCUTANEOUS at 05:08

## 2019-12-29 RX ADMIN — HEPARIN SODIUM 5000 UNIT(S): 5000 INJECTION INTRAVENOUS; SUBCUTANEOUS at 14:47

## 2019-12-29 RX ADMIN — MAGNESIUM OXIDE 400 MG ORAL TABLET 400 MILLIGRAM(S): 241.3 TABLET ORAL at 08:45

## 2019-12-29 RX ADMIN — Medication 100 MILLIGRAM(S): at 12:09

## 2019-12-29 RX ADMIN — MAGNESIUM OXIDE 400 MG ORAL TABLET 400 MILLIGRAM(S): 241.3 TABLET ORAL at 17:36

## 2019-12-29 RX ADMIN — MAGNESIUM OXIDE 400 MG ORAL TABLET 400 MILLIGRAM(S): 241.3 TABLET ORAL at 12:09

## 2019-12-29 RX ADMIN — SODIUM CHLORIDE 4 MILLILITER(S): 9 INJECTION INTRAMUSCULAR; INTRAVENOUS; SUBCUTANEOUS at 20:06

## 2019-12-29 NOTE — PROGRESS NOTE ADULT - PROBLEM SELECTOR PLAN 1
patient undergoing systemic chemotherapy after he completed 5 cycles of weekly cisplatin/ XRT. He has recovered after fall/head trauma, and CT head was unremarkable. Patient is okay for discharge in a.m. advised followup with oncology to resume treatment.

## 2019-12-30 ENCOUNTER — APPOINTMENT (OUTPATIENT)
Age: 79
End: 2019-12-30

## 2019-12-30 LAB
-  AMPICILLIN/SULBACTAM: SIGNIFICANT CHANGE UP
-  CEFAZOLIN: SIGNIFICANT CHANGE UP
-  CLINDAMYCIN: SIGNIFICANT CHANGE UP
-  ERYTHROMYCIN: SIGNIFICANT CHANGE UP
-  GENTAMICIN: SIGNIFICANT CHANGE UP
-  OXACILLIN: SIGNIFICANT CHANGE UP
-  RIFAMPIN: SIGNIFICANT CHANGE UP
-  TETRACYCLINE: SIGNIFICANT CHANGE UP
-  TRIMETHOPRIM/SULFAMETHOXAZOLE: SIGNIFICANT CHANGE UP
-  VANCOMYCIN: SIGNIFICANT CHANGE UP
ANION GAP SERPL CALC-SCNC: 12 MMOL/L — SIGNIFICANT CHANGE UP (ref 5–17)
BUN SERPL-MCNC: 26 MG/DL — HIGH (ref 8–20)
CALCIUM SERPL-MCNC: 8.3 MG/DL — LOW (ref 8.6–10.2)
CHLORIDE SERPL-SCNC: 95 MMOL/L — LOW (ref 98–107)
CO2 SERPL-SCNC: 27 MMOL/L — SIGNIFICANT CHANGE UP (ref 22–29)
CREAT SERPL-MCNC: 1.02 MG/DL — SIGNIFICANT CHANGE UP (ref 0.5–1.3)
GLUCOSE SERPL-MCNC: 110 MG/DL — SIGNIFICANT CHANGE UP (ref 70–115)
GRAM STN FLD: SIGNIFICANT CHANGE UP
HCT VFR BLD CALC: 21.8 % — LOW (ref 39–50)
HGB BLD-MCNC: 7.1 G/DL — LOW (ref 13–17)
LEGIONELLA AG UR QL: NEGATIVE — SIGNIFICANT CHANGE UP
MAGNESIUM SERPL-MCNC: 1.4 MG/DL — LOW (ref 1.6–2.6)
MCHC RBC-ENTMCNC: 29.5 PG — SIGNIFICANT CHANGE UP (ref 27–34)
MCHC RBC-ENTMCNC: 32.6 GM/DL — SIGNIFICANT CHANGE UP (ref 32–36)
MCV RBC AUTO: 90.5 FL — SIGNIFICANT CHANGE UP (ref 80–100)
METHOD TYPE: SIGNIFICANT CHANGE UP
PLATELET # BLD AUTO: 263 K/UL — SIGNIFICANT CHANGE UP (ref 150–400)
POTASSIUM SERPL-MCNC: 3.4 MMOL/L — LOW (ref 3.5–5.3)
POTASSIUM SERPL-SCNC: 3.4 MMOL/L — LOW (ref 3.5–5.3)
RBC # BLD: 2.41 M/UL — LOW (ref 4.2–5.8)
RBC # FLD: 14.2 % — SIGNIFICANT CHANGE UP (ref 10.3–14.5)
SODIUM SERPL-SCNC: 134 MMOL/L — LOW (ref 135–145)
SPECIMEN SOURCE: SIGNIFICANT CHANGE UP
WBC # BLD: 3.8 K/UL — SIGNIFICANT CHANGE UP (ref 3.8–10.5)
WBC # FLD AUTO: 3.8 K/UL — SIGNIFICANT CHANGE UP (ref 3.8–10.5)

## 2019-12-30 PROCEDURE — 99232 SBSQ HOSP IP/OBS MODERATE 35: CPT

## 2019-12-30 PROCEDURE — 99233 SBSQ HOSP IP/OBS HIGH 50: CPT

## 2019-12-30 PROCEDURE — 71250 CT THORAX DX C-: CPT | Mod: 26

## 2019-12-30 RX ORDER — POTASSIUM CHLORIDE 20 MEQ
10 PACKET (EA) ORAL
Refills: 0 | Status: COMPLETED | OUTPATIENT
Start: 2019-12-30 | End: 2019-12-30

## 2019-12-30 RX ORDER — SODIUM CHLORIDE 9 MG/ML
1000 INJECTION INTRAMUSCULAR; INTRAVENOUS; SUBCUTANEOUS
Refills: 0 | Status: DISCONTINUED | OUTPATIENT
Start: 2019-12-30 | End: 2019-12-31

## 2019-12-30 RX ORDER — MAGNESIUM SULFATE 500 MG/ML
2 VIAL (ML) INJECTION ONCE
Refills: 0 | Status: COMPLETED | OUTPATIENT
Start: 2019-12-30 | End: 2019-12-30

## 2019-12-30 RX ORDER — POTASSIUM CHLORIDE 20 MEQ
20 PACKET (EA) ORAL
Refills: 0 | Status: COMPLETED | OUTPATIENT
Start: 2019-12-30 | End: 2020-01-02

## 2019-12-30 RX ORDER — DIPHENHYDRAMINE HYDROCHLORIDE AND LIDOCAINE HYDROCHLORIDE AND ALUMINUM HYDROXIDE AND MAGNESIUM HYDRO
5 KIT
Refills: 0 | Status: DISCONTINUED | OUTPATIENT
Start: 2019-12-30 | End: 2020-01-03

## 2019-12-30 RX ADMIN — CEFEPIME 100 MILLIGRAM(S): 1 INJECTION, POWDER, FOR SOLUTION INTRAMUSCULAR; INTRAVENOUS at 18:35

## 2019-12-30 RX ADMIN — Medication 100 MILLIEQUIVALENT(S): at 15:03

## 2019-12-30 RX ADMIN — HEPARIN SODIUM 5000 UNIT(S): 5000 INJECTION INTRAVENOUS; SUBCUTANEOUS at 22:21

## 2019-12-30 RX ADMIN — HEPARIN SODIUM 5000 UNIT(S): 5000 INJECTION INTRAVENOUS; SUBCUTANEOUS at 13:36

## 2019-12-30 RX ADMIN — MAGNESIUM OXIDE 400 MG ORAL TABLET 400 MILLIGRAM(S): 241.3 TABLET ORAL at 16:38

## 2019-12-30 RX ADMIN — Medication 250 MILLIGRAM(S): at 18:36

## 2019-12-30 RX ADMIN — MAGNESIUM OXIDE 400 MG ORAL TABLET 400 MILLIGRAM(S): 241.3 TABLET ORAL at 13:36

## 2019-12-30 RX ADMIN — Medication 250 MILLIGRAM(S): at 05:50

## 2019-12-30 RX ADMIN — SODIUM CHLORIDE 4 MILLILITER(S): 9 INJECTION INTRAMUSCULAR; INTRAVENOUS; SUBCUTANEOUS at 09:07

## 2019-12-30 RX ADMIN — CEFEPIME 100 MILLIGRAM(S): 1 INJECTION, POWDER, FOR SOLUTION INTRAMUSCULAR; INTRAVENOUS at 05:12

## 2019-12-30 RX ADMIN — DIPHENHYDRAMINE HYDROCHLORIDE AND LIDOCAINE HYDROCHLORIDE AND ALUMINUM HYDROXIDE AND MAGNESIUM HYDRO 5 MILLILITER(S): KIT at 16:26

## 2019-12-30 RX ADMIN — Medication 50 GRAM(S): at 22:21

## 2019-12-30 RX ADMIN — Medication 20 MILLIEQUIVALENT(S): at 18:36

## 2019-12-30 RX ADMIN — HEPARIN SODIUM 5000 UNIT(S): 5000 INJECTION INTRAVENOUS; SUBCUTANEOUS at 05:22

## 2019-12-30 RX ADMIN — Medication 100 MILLIEQUIVALENT(S): at 13:35

## 2019-12-30 RX ADMIN — Medication 100 MILLIEQUIVALENT(S): at 16:37

## 2019-12-30 NOTE — CHART NOTE - NSCHARTNOTEFT_GEN_A_CORE
Upon Nutritional Assessment by the Registered Dietitian your patient was determined to meet criteria / has evidence of the following diagnosis/diagnoses:                [x ] Severe Protein Calorie Malnutrition            Findings as based on:  •  Comprehensive nutrition assessment and consultation  •  Calorie counts (nutrient intake analysis)  •  Food acceptance and intake status from observations by staff  •  Follow up  •  Patient education  •  Intervention secondary to interdisciplinary rounds  •   concerns      Treatment:    The following diet has been recommended:      PROVIDER Section:     By signing this assessment you are acknowledging and agree with the diagnosis/diagnoses assigned by the Registered Dietitian    Comments:    Increase Jevity 1.5 to goal rate of 70ml/hour (20 hours) providing 2100kcal and 89g protein

## 2019-12-30 NOTE — DIETITIAN INITIAL EVALUATION ADULT. - ETIOLOGY
related to inability to meet sufficient protein-energy in setting of decreased po intake PTA and malignant neoplasm of tonsil

## 2019-12-30 NOTE — PHYSICAL THERAPY INITIAL EVALUATION ADULT - IMPAIRMENTS CONTRIBUTING TO GAIT DEVIATIONS, PT EVAL
decreased strength/impaired balance/decreased endurance; pt desat to 83% while ambulating on room air, RN aware; unsteadiness with turns

## 2019-12-30 NOTE — PHYSICAL THERAPY INITIAL EVALUATION ADULT - CRITERIA FOR SKILLED THERAPEUTIC INTERVENTIONS
anticipated equipment needs at discharge/anticipated discharge recommendation/impairments found/functional limitations in following categories

## 2019-12-30 NOTE — PHYSICAL THERAPY INITIAL EVALUATION ADULT - PHYSICAL ASSIST/NONPHYSICAL ASSIST: GAIT, REHAB EVAL
1 person assist/supervision/hand hold assist needed during ambulation without AD; supervision while walking with RW

## 2019-12-30 NOTE — PHYSICAL THERAPY INITIAL EVALUATION ADULT - GAIT DISTANCE, PT EVAL
75 feet without AD hand hold assist needed 3 times secondary to unsteadiness, no loss of balance; 25 feet with RW/100 feet 75 feet without AD hand hold assist needed 3 times secondary to unsteadiness, no loss of balance/fall; 20 feet with RW

## 2019-12-30 NOTE — CDI QUERY NOTE - NSCDIOTHERTXTBX_GEN_ALL_CORE_HH
Patient was a code sepsis on 12/27 and then he was documented initially to have SIRS then sepsis. Can you please clarify if sepsis was present on admission?    A.	Sepsis present on admission  B.	Other, please clarify  C.	Not clinically significant    Supporting Documentations:    Laboratory:    WBC Count  12/27/19: 3.00L  12/28/19: 1.67 --> 3.70L  12/29/19: 2.95L  12/30/19: 3.80    Blood Gas Venous Lactate  12/27/19: 1.0    12/27/19 @ 2224 ED Adult Flow Sheet:  Sepsis  Code Sepsis Called Date/Time: 27-Dec-2019 22:27       Sepsis Note [Charted Location: [facility] 160 29] [Authored: 27-Dec-2019 22:55]-   OBJECTIVE:    T/HR/RR/BP:  • Temp (F)	  100.7 Degrees F  • Temp (C)	  38.2 Degrees C  • Heart Rate	92 /min  • Respiration Rate (breaths/min)	20 /min  • BP Systolic	127 mm Hg  • BP Diastolic	70 mm Hg  • Blood Pressure - Method	electronic  • BP Noninvasive Mean	89 mm Hg  • SpO2 (%)	  83 %  • O2 delivery	room air  • Comments	93% on O2      12/27/19 H&P Adult:  Code sepsis called in the ED for fever of 100.7, patient also became hypoxic on RA.   SIRS   Septic work up sent; follow up       12/28/19 @ 0052 Sepsis Note:  Code sepsis follow up  • Assessment	  SIRS  - Lactate 1.0  - afebrile at present, oxygenating well on nasal cannula  - BC x2 pending  - continue cefepime empirically (no florastor - chemotherapy)  - RVP pending      12/28/19 Progress Note Hospitalist:  1. Sepsis   probably related to viral illness .  requested for ID consult.  on cefepime as patient is immunocompromised .

## 2019-12-30 NOTE — PROGRESS NOTE ADULT - ATTENDING COMMENTS
agree with above , patient seen independently. CT chest reviewed showed multi focal PNA .  afebrile, follow final cultures , continue with antibiotics .  offered melatonin but patient refused .  labs also showed anemia probably side effect of chemotherapy , will transfuse 1 unit .  details extensively discussed with daughter and patient at bedside , all concerns answered . agree with above , patient seen independently. CT chest reviewed showed multi focal PNA .  sepsis was present on admission , improved  afebrile, follow final cultures , continue with antibiotics .  offered melatonin but patient refused .  labs also showed anemia probably side effect of chemotherapy , will transfuse 1 unit .  details extensively discussed with daughter and patient at bedside , all concerns answered .

## 2019-12-30 NOTE — DIETITIAN INITIAL EVALUATION ADULT. - PERTINENT LABORATORY DATA
12-30 Na134 mmol/L<L> Glu 110 mg/dL K+ 3.4 mmol/L<L> Cr  1.02 mg/dL BUN 26.0 mg/dL<H> Phos n/a   Alb n/a   PAB n/a

## 2019-12-30 NOTE — PHYSICAL THERAPY INITIAL EVALUATION ADULT - GENERAL OBSERVATIONS, REHAB EVAL
Pt received in bed+ IV Loc, +Tele, + NC O2 + peg tube. pt in NAD and agreeable to mobilize with PT. SOTO Toscano in room cleared pt for PT, asking to ambulate pt on room air.

## 2019-12-30 NOTE — PHYSICAL THERAPY INITIAL EVALUATION ADULT - ADDITIONAL COMMENTS
pt states that he lives alone in a house with 0 steps to enter and 0 steps inside. pt states he is also able to stay with his wife where she has 12 stairs inside her house with 1 rail. pt owns no DME. pt states that his wife is able to help him as needed.

## 2019-12-30 NOTE — DIETITIAN INITIAL EVALUATION ADULT. - OTHER INFO
78 y/o male with PMH of throat ca on CT and RT came to the ED complaining of pre-syncope and fall. Patient reported that he has been having decrease PO intake due to the throat ca, recently had a PEG tube placement. Pt tube feeding paused during assessment. Pt did not want to participate in assessment, limited information obtained. Pt stated he lost weight but unsure of how much.

## 2019-12-30 NOTE — DIETITIAN INITIAL EVALUATION ADULT. - MALNUTRITION
NFPE performed. Severe muscle wasting at temples and shoulder. Severe fat loss in orbital region and triceps. Severe (Chronic)

## 2019-12-30 NOTE — PHYSICAL THERAPY INITIAL EVALUATION ADULT - PERTINENT HX OF CURRENT PROBLEM, REHAB EVAL
pt  came to the ED complaining of pre-syncope and fall. Patient said he was walking up the stairs and started feeling light headed, he fell forward and hit his head on the right.

## 2019-12-31 ENCOUNTER — APPOINTMENT (OUTPATIENT)
Dept: OTOLARYNGOLOGY | Facility: CLINIC | Age: 79
End: 2019-12-31

## 2019-12-31 LAB
ANION GAP SERPL CALC-SCNC: 12 MMOL/L — SIGNIFICANT CHANGE UP (ref 5–17)
BUN SERPL-MCNC: 21 MG/DL — HIGH (ref 8–20)
CALCIUM SERPL-MCNC: 8 MG/DL — LOW (ref 8.6–10.2)
CHLORIDE SERPL-SCNC: 95 MMOL/L — LOW (ref 98–107)
CO2 SERPL-SCNC: 29 MMOL/L — SIGNIFICANT CHANGE UP (ref 22–29)
CREAT SERPL-MCNC: 1.01 MG/DL — SIGNIFICANT CHANGE UP (ref 0.5–1.3)
GLUCOSE SERPL-MCNC: 139 MG/DL — HIGH (ref 70–115)
HCT VFR BLD CALC: 25.4 % — LOW (ref 39–50)
HGB BLD-MCNC: 8.8 G/DL — LOW (ref 13–17)
MAGNESIUM SERPL-MCNC: 1.6 MG/DL — SIGNIFICANT CHANGE UP (ref 1.6–2.6)
MCHC RBC-ENTMCNC: 30.4 PG — SIGNIFICANT CHANGE UP (ref 27–34)
MCHC RBC-ENTMCNC: 34.6 GM/DL — SIGNIFICANT CHANGE UP (ref 32–36)
MCV RBC AUTO: 87.9 FL — SIGNIFICANT CHANGE UP (ref 80–100)
PHOSPHATE SERPL-MCNC: 2.4 MG/DL — SIGNIFICANT CHANGE UP (ref 2.4–4.7)
PLATELET # BLD AUTO: 280 K/UL — SIGNIFICANT CHANGE UP (ref 150–400)
POTASSIUM SERPL-MCNC: 3.5 MMOL/L — SIGNIFICANT CHANGE UP (ref 3.5–5.3)
POTASSIUM SERPL-SCNC: 3.5 MMOL/L — SIGNIFICANT CHANGE UP (ref 3.5–5.3)
RBC # BLD: 2.89 M/UL — LOW (ref 4.2–5.8)
RBC # FLD: 14.1 % — SIGNIFICANT CHANGE UP (ref 10.3–14.5)
SODIUM SERPL-SCNC: 136 MMOL/L — SIGNIFICANT CHANGE UP (ref 135–145)
VANCOMYCIN TROUGH SERPL-MCNC: 20.5 UG/ML — HIGH (ref 10–20)
WBC # BLD: 4.94 K/UL — SIGNIFICANT CHANGE UP (ref 3.8–10.5)
WBC # FLD AUTO: 4.94 K/UL — SIGNIFICANT CHANGE UP (ref 3.8–10.5)

## 2019-12-31 PROCEDURE — 99232 SBSQ HOSP IP/OBS MODERATE 35: CPT

## 2019-12-31 RX ORDER — VANCOMYCIN HCL 1 G
750 VIAL (EA) INTRAVENOUS EVERY 12 HOURS
Refills: 0 | Status: DISCONTINUED | OUTPATIENT
Start: 2019-12-31 | End: 2020-01-01

## 2019-12-31 RX ADMIN — DIPHENHYDRAMINE HYDROCHLORIDE AND LIDOCAINE HYDROCHLORIDE AND ALUMINUM HYDROXIDE AND MAGNESIUM HYDRO 5 MILLILITER(S): KIT at 17:29

## 2019-12-31 RX ADMIN — DIPHENHYDRAMINE HYDROCHLORIDE AND LIDOCAINE HYDROCHLORIDE AND ALUMINUM HYDROXIDE AND MAGNESIUM HYDRO 5 MILLILITER(S): KIT at 11:52

## 2019-12-31 RX ADMIN — MAGNESIUM OXIDE 400 MG ORAL TABLET 400 MILLIGRAM(S): 241.3 TABLET ORAL at 11:52

## 2019-12-31 RX ADMIN — HEPARIN SODIUM 5000 UNIT(S): 5000 INJECTION INTRAVENOUS; SUBCUTANEOUS at 05:45

## 2019-12-31 RX ADMIN — HEPARIN SODIUM 5000 UNIT(S): 5000 INJECTION INTRAVENOUS; SUBCUTANEOUS at 14:27

## 2019-12-31 RX ADMIN — MAGNESIUM OXIDE 400 MG ORAL TABLET 400 MILLIGRAM(S): 241.3 TABLET ORAL at 17:29

## 2019-12-31 RX ADMIN — Medication 250 MILLIGRAM(S): at 09:40

## 2019-12-31 RX ADMIN — CEFEPIME 100 MILLIGRAM(S): 1 INJECTION, POWDER, FOR SOLUTION INTRAMUSCULAR; INTRAVENOUS at 05:46

## 2019-12-31 RX ADMIN — Medication 20 MILLIEQUIVALENT(S): at 17:29

## 2019-12-31 RX ADMIN — CEFEPIME 100 MILLIGRAM(S): 1 INJECTION, POWDER, FOR SOLUTION INTRAMUSCULAR; INTRAVENOUS at 17:28

## 2019-12-31 RX ADMIN — Medication 250 MILLIGRAM(S): at 21:26

## 2019-12-31 RX ADMIN — Medication 100 MILLIGRAM(S): at 05:45

## 2019-12-31 RX ADMIN — MAGNESIUM OXIDE 400 MG ORAL TABLET 400 MILLIGRAM(S): 241.3 TABLET ORAL at 09:40

## 2019-12-31 RX ADMIN — HEPARIN SODIUM 5000 UNIT(S): 5000 INJECTION INTRAVENOUS; SUBCUTANEOUS at 21:26

## 2019-12-31 RX ADMIN — Medication 20 MILLIEQUIVALENT(S): at 05:45

## 2019-12-31 NOTE — CHART NOTE - NSCHARTNOTEFT_GEN_A_CORE
Nutrition consult for bolus TF recs:   Pt on Jevity 1.5 at 70 ml/hr. TF on hold. pt found with tube disconnected.   Pt reports that he was bolusing only 2  cans at home, but was advised to take 4.   Given Pt's history on noncompliance with TF regimen, Will establish times that should work with Pt's schedule.      Pt will need a GOAL of 6 8oz cans / day of Jevity 1.5 to meet estimated needs.  to provide 1,440 ml, 2,160 kcal , 92 g protein, 1,094 ml of free water.   This will meet 100% est needs for macro and micronutrients.   Pt will need 1,396 additional ml of fluids to meet hydration needs.   4 oz water flush before and after each bolus would accomplish this.     Rec:  1) start pt on bolus regimen of 4x /day  ( 9am, 1pm, 5pm, 9pm)  2) increase to 5 cans on day 2 ( 9am, 12pm, 3 pm, 6 pm, 9 pm)  3) increase to 6 cans by day 3 ( 6am, 9am, 12pm, 3pm, 6pm, 9pm)

## 2019-12-31 NOTE — PROGRESS NOTE ADULT - ATTENDING COMMENTS
agree with above .  patient seen and examined .  free water flushes added , IVF stopped.  tube feeds changed to bolus feeds .  will remain on IV antibiotics for multifocal PNA .  no family members at bedside but details discussed with patient

## 2020-01-01 LAB
-  LEVOFLOXACIN: SIGNIFICANT CHANGE UP
-  TRIMETHOPRIM/SULFAMETHOXAZOLE: SIGNIFICANT CHANGE UP
ANION GAP SERPL CALC-SCNC: 11 MMOL/L — SIGNIFICANT CHANGE UP (ref 5–17)
BUN SERPL-MCNC: 19 MG/DL — SIGNIFICANT CHANGE UP (ref 8–20)
CALCIUM SERPL-MCNC: 8.1 MG/DL — LOW (ref 8.6–10.2)
CHLORIDE SERPL-SCNC: 89 MMOL/L — LOW (ref 98–107)
CO2 SERPL-SCNC: 31 MMOL/L — HIGH (ref 22–29)
CREAT SERPL-MCNC: 0.87 MG/DL — SIGNIFICANT CHANGE UP (ref 0.5–1.3)
CULTURE RESULTS: SIGNIFICANT CHANGE UP
GLUCOSE SERPL-MCNC: 104 MG/DL — SIGNIFICANT CHANGE UP (ref 70–115)
HCT VFR BLD CALC: 26.7 % — LOW (ref 39–50)
HGB BLD-MCNC: 8.9 G/DL — LOW (ref 13–17)
MAGNESIUM SERPL-MCNC: 1.2 MG/DL — LOW (ref 1.6–2.6)
MCHC RBC-ENTMCNC: 29.7 PG — SIGNIFICANT CHANGE UP (ref 27–34)
MCHC RBC-ENTMCNC: 33.3 GM/DL — SIGNIFICANT CHANGE UP (ref 32–36)
MCV RBC AUTO: 89 FL — SIGNIFICANT CHANGE UP (ref 80–100)
METHOD TYPE: SIGNIFICANT CHANGE UP
ORGANISM # SPEC MICROSCOPIC CNT: SIGNIFICANT CHANGE UP
ORGANISM # SPEC MICROSCOPIC CNT: SIGNIFICANT CHANGE UP
PHOSPHATE SERPL-MCNC: 2.2 MG/DL — LOW (ref 2.4–4.7)
PLATELET # BLD AUTO: 308 K/UL — SIGNIFICANT CHANGE UP (ref 150–400)
POTASSIUM SERPL-MCNC: 3.8 MMOL/L — SIGNIFICANT CHANGE UP (ref 3.5–5.3)
POTASSIUM SERPL-SCNC: 3.8 MMOL/L — SIGNIFICANT CHANGE UP (ref 3.5–5.3)
RBC # BLD: 3 M/UL — LOW (ref 4.2–5.8)
RBC # FLD: 14.1 % — SIGNIFICANT CHANGE UP (ref 10.3–14.5)
SODIUM SERPL-SCNC: 131 MMOL/L — LOW (ref 135–145)
SPECIMEN SOURCE: SIGNIFICANT CHANGE UP
WBC # BLD: 5.75 K/UL — SIGNIFICANT CHANGE UP (ref 3.8–10.5)
WBC # FLD AUTO: 5.75 K/UL — SIGNIFICANT CHANGE UP (ref 3.8–10.5)

## 2020-01-01 PROCEDURE — 99232 SBSQ HOSP IP/OBS MODERATE 35: CPT

## 2020-01-01 RX ADMIN — MAGNESIUM OXIDE 400 MG ORAL TABLET 400 MILLIGRAM(S): 241.3 TABLET ORAL at 09:15

## 2020-01-01 RX ADMIN — MAGNESIUM OXIDE 400 MG ORAL TABLET 400 MILLIGRAM(S): 241.3 TABLET ORAL at 12:46

## 2020-01-01 RX ADMIN — HEPARIN SODIUM 5000 UNIT(S): 5000 INJECTION INTRAVENOUS; SUBCUTANEOUS at 15:01

## 2020-01-01 RX ADMIN — Medication 20 MILLIEQUIVALENT(S): at 17:38

## 2020-01-01 RX ADMIN — DIPHENHYDRAMINE HYDROCHLORIDE AND LIDOCAINE HYDROCHLORIDE AND ALUMINUM HYDROXIDE AND MAGNESIUM HYDRO 5 MILLILITER(S): KIT at 17:38

## 2020-01-01 RX ADMIN — DIPHENHYDRAMINE HYDROCHLORIDE AND LIDOCAINE HYDROCHLORIDE AND ALUMINUM HYDROXIDE AND MAGNESIUM HYDRO 5 MILLILITER(S): KIT at 12:47

## 2020-01-01 RX ADMIN — CEFEPIME 100 MILLIGRAM(S): 1 INJECTION, POWDER, FOR SOLUTION INTRAMUSCULAR; INTRAVENOUS at 05:43

## 2020-01-01 RX ADMIN — MAGNESIUM OXIDE 400 MG ORAL TABLET 400 MILLIGRAM(S): 241.3 TABLET ORAL at 17:38

## 2020-01-01 RX ADMIN — HEPARIN SODIUM 5000 UNIT(S): 5000 INJECTION INTRAVENOUS; SUBCUTANEOUS at 22:15

## 2020-01-01 RX ADMIN — Medication 250 MILLIGRAM(S): at 09:14

## 2020-01-01 RX ADMIN — HEPARIN SODIUM 5000 UNIT(S): 5000 INJECTION INTRAVENOUS; SUBCUTANEOUS at 05:43

## 2020-01-01 RX ADMIN — DIPHENHYDRAMINE HYDROCHLORIDE AND LIDOCAINE HYDROCHLORIDE AND ALUMINUM HYDROXIDE AND MAGNESIUM HYDRO 5 MILLILITER(S): KIT at 05:42

## 2020-01-01 RX ADMIN — Medication 20 MILLIEQUIVALENT(S): at 05:43

## 2020-01-01 NOTE — PHARMACOTHERAPY INTERVENTION NOTE - COMMENTS
Vancomycin level 20.5, adjusted dose to 750 mg IV q12h
Vancomycin level ordered prior to 10PM dose on 1/1/20

## 2020-01-02 LAB
ANION GAP SERPL CALC-SCNC: 11 MMOL/L — SIGNIFICANT CHANGE UP (ref 5–17)
BUN SERPL-MCNC: 19 MG/DL — SIGNIFICANT CHANGE UP (ref 8–20)
CALCIUM SERPL-MCNC: 8.3 MG/DL — LOW (ref 8.6–10.2)
CHLORIDE SERPL-SCNC: 88 MMOL/L — LOW (ref 98–107)
CO2 SERPL-SCNC: 31 MMOL/L — HIGH (ref 22–29)
CREAT SERPL-MCNC: 0.97 MG/DL — SIGNIFICANT CHANGE UP (ref 0.5–1.3)
CULTURE RESULTS: SIGNIFICANT CHANGE UP
GLUCOSE SERPL-MCNC: 95 MG/DL — SIGNIFICANT CHANGE UP (ref 70–115)
HCT VFR BLD CALC: 27.4 % — LOW (ref 39–50)
HGB BLD-MCNC: 8.8 G/DL — LOW (ref 13–17)
MAGNESIUM SERPL-MCNC: 1.2 MG/DL — LOW (ref 1.6–2.6)
MCHC RBC-ENTMCNC: 29.3 PG — SIGNIFICANT CHANGE UP (ref 27–34)
MCHC RBC-ENTMCNC: 32.1 GM/DL — SIGNIFICANT CHANGE UP (ref 32–36)
MCV RBC AUTO: 91.3 FL — SIGNIFICANT CHANGE UP (ref 80–100)
ORGANISM # SPEC MICROSCOPIC CNT: SIGNIFICANT CHANGE UP
PHOSPHATE SERPL-MCNC: 2.2 MG/DL — LOW (ref 2.4–4.7)
PLATELET # BLD AUTO: 342 K/UL — SIGNIFICANT CHANGE UP (ref 150–400)
POTASSIUM SERPL-MCNC: 4.1 MMOL/L — SIGNIFICANT CHANGE UP (ref 3.5–5.3)
POTASSIUM SERPL-SCNC: 4.1 MMOL/L — SIGNIFICANT CHANGE UP (ref 3.5–5.3)
RBC # BLD: 3 M/UL — LOW (ref 4.2–5.8)
RBC # FLD: 14.2 % — SIGNIFICANT CHANGE UP (ref 10.3–14.5)
SODIUM SERPL-SCNC: 130 MMOL/L — LOW (ref 135–145)
SPECIMEN SOURCE: SIGNIFICANT CHANGE UP
VANCOMYCIN TROUGH SERPL-MCNC: 18.8 UG/ML — SIGNIFICANT CHANGE UP (ref 10–20)
WBC # BLD: 6.27 K/UL — SIGNIFICANT CHANGE UP (ref 3.8–10.5)
WBC # FLD AUTO: 6.27 K/UL — SIGNIFICANT CHANGE UP (ref 3.8–10.5)

## 2020-01-02 PROCEDURE — 99232 SBSQ HOSP IP/OBS MODERATE 35: CPT

## 2020-01-02 PROCEDURE — 99222 1ST HOSP IP/OBS MODERATE 55: CPT

## 2020-01-02 RX ORDER — MAGNESIUM SULFATE 500 MG/ML
2 VIAL (ML) INJECTION ONCE
Refills: 0 | Status: COMPLETED | OUTPATIENT
Start: 2020-01-02 | End: 2020-01-02

## 2020-01-02 RX ORDER — SODIUM CHLORIDE 9 MG/ML
1000 INJECTION INTRAMUSCULAR; INTRAVENOUS; SUBCUTANEOUS
Refills: 0 | Status: COMPLETED | OUTPATIENT
Start: 2020-01-02 | End: 2020-01-02

## 2020-01-02 RX ORDER — SODIUM CHLORIDE 0.65 %
1 AEROSOL, SPRAY (ML) NASAL
Refills: 0 | Status: DISCONTINUED | OUTPATIENT
Start: 2020-01-02 | End: 2020-01-03

## 2020-01-02 RX ADMIN — Medication 1 SPRAY(S): at 18:22

## 2020-01-02 RX ADMIN — DIPHENHYDRAMINE HYDROCHLORIDE AND LIDOCAINE HYDROCHLORIDE AND ALUMINUM HYDROXIDE AND MAGNESIUM HYDRO 5 MILLILITER(S): KIT at 05:54

## 2020-01-02 RX ADMIN — DIPHENHYDRAMINE HYDROCHLORIDE AND LIDOCAINE HYDROCHLORIDE AND ALUMINUM HYDROXIDE AND MAGNESIUM HYDRO 5 MILLILITER(S): KIT at 17:06

## 2020-01-02 RX ADMIN — MAGNESIUM OXIDE 400 MG ORAL TABLET 400 MILLIGRAM(S): 241.3 TABLET ORAL at 17:06

## 2020-01-02 RX ADMIN — HEPARIN SODIUM 5000 UNIT(S): 5000 INJECTION INTRAVENOUS; SUBCUTANEOUS at 13:28

## 2020-01-02 RX ADMIN — Medication 20 MILLIEQUIVALENT(S): at 05:54

## 2020-01-02 RX ADMIN — DIPHENHYDRAMINE HYDROCHLORIDE AND LIDOCAINE HYDROCHLORIDE AND ALUMINUM HYDROXIDE AND MAGNESIUM HYDRO 5 MILLILITER(S): KIT at 00:03

## 2020-01-02 RX ADMIN — HEPARIN SODIUM 5000 UNIT(S): 5000 INJECTION INTRAVENOUS; SUBCUTANEOUS at 22:37

## 2020-01-02 RX ADMIN — SODIUM CHLORIDE 75 MILLILITER(S): 9 INJECTION INTRAMUSCULAR; INTRAVENOUS; SUBCUTANEOUS at 15:00

## 2020-01-02 RX ADMIN — Medication 1 SPRAY(S): at 23:15

## 2020-01-02 RX ADMIN — Medication 50 GRAM(S): at 10:56

## 2020-01-02 RX ADMIN — DIPHENHYDRAMINE HYDROCHLORIDE AND LIDOCAINE HYDROCHLORIDE AND ALUMINUM HYDROXIDE AND MAGNESIUM HYDRO 5 MILLILITER(S): KIT at 12:20

## 2020-01-02 RX ADMIN — MAGNESIUM OXIDE 400 MG ORAL TABLET 400 MILLIGRAM(S): 241.3 TABLET ORAL at 12:20

## 2020-01-02 RX ADMIN — HEPARIN SODIUM 5000 UNIT(S): 5000 INJECTION INTRAVENOUS; SUBCUTANEOUS at 05:54

## 2020-01-02 RX ADMIN — MAGNESIUM OXIDE 400 MG ORAL TABLET 400 MILLIGRAM(S): 241.3 TABLET ORAL at 08:41

## 2020-01-02 NOTE — CONSULT NOTE ADULT - SUBJECTIVE AND OBJECTIVE BOX
Patient is a 79y old  Male who presents with a chief complaint of Pre-syncope (01 Jan 2020 15:17)      HPI:  80 y/o male with PMH of throat ca on CT and RT came to the ED complaining of pre-syncope and fall. Patient said he was walking up the stairs and started feeling light headed, he fell forward and hit his head on the right. He called out to his wife who helped him up. Patient then went for his radiation therapy which was completed and later told to come to the ED for evaluation of fall. Patient reported that he has been having decrease PO intake due to the throat ca, recently had a PEG tube placement. He also noted cough sometimes productive of greenish sputum with blood streak occasionally. Patient has no HA, weakness, blurry vision, tongue bite, bowel/bladder incontinence, LOC, chest pain, shortness of breath low grade fevers, ,  chills, palpitation.       Patient  here with bacteremia and PNA.  Used PEG for feeding. - copious secretions. Called as pt  the tip pf pt's feeding port is broken ( the cap to feeding tip broke)    REVIEW OF SYSTEMS:  Constitutional: No fever, weight loss or fatigue  ENMT:  No difficulty hearing, tinnitus, vertigo; No sinus or throat pain  Respiratory: + cough, wheezing, chills or hemoptysis  H- RRR  L- CTA  Cardiovascular: No chest pain, palpitations, dizziness or leg swelling  Gastrointestinal: No abdominal or epigastric pain. No nausea, vomiting or hematemesis; No diarrhea or constipation. No melena or hematochezia.  Skin: No itching, burning, rashes or lesions   Musculoskeletal: No joint pain or swelling; No muscle, back or extremity pain    PAST MEDICAL & SURGICAL HISTORY:  Malignant neoplasm of tonsil  S/P cataract surgery: bilateral eyes  S/P laparoscopic cholecystectomy      FAMILY HISTORY:  FH: Alzheimers disease      SOCIAL HISTORY:  Smoking Status: [ ] Current, [ ] Former, [ ] Never  Pack Years:  [  ] EtOH-no  [  ] IVDA    MEDICATIONS:  MEDICATIONS  (STANDING):  FIRST- Mouthwash  BLM 5 milliLiter(s) Swish and Spit four times a day  heparin  Injectable 5000 Unit(s) SubCutaneous every 8 hours  levoFLOXacin IVPB 750 milliGRAM(s) IV Intermittent every 24 hours  magnesium oxide 400 milliGRAM(s) Oral three times a day with meals  sodium chloride 3%  Inhalation 4 milliLiter(s) Inhalation three times a day    MEDICATIONS  (PRN):  acetaminophen   Tablet .. 650 milliGRAM(s) Oral every 6 hours PRN Temp greater or equal to 38C (100.4F), Mild Pain (1 - 3)  ondansetron    Tablet 8 milliGRAM(s) Oral every 8 hours PRN Nausea and/or Vomiting      Allergies    No Known Allergies    Intolerances        Vital Signs Last 24 Hrs  T(C): 36.4 (01 Jan 2020 23:47), Max: 37.1 (01 Jan 2020 07:45)  T(F): 97.5 (01 Jan 2020 23:47), Max: 98.8 (01 Jan 2020 07:45)  HR: 89 (01 Jan 2020 23:47) (55 - 89)  BP: 119/69 (01 Jan 2020 23:47) (119/69 - 137/69)  BP(mean): --  RR: 18 (01 Jan 2020 23:47) (18 - 18)  SpO2: 96% (01 Jan 2020 23:47) (92% - 98%)    01-01 @ 07:01  -  01-02 @ 07:00  --------------------------------------------------------  IN: 1120 mL / OUT: 0 mL / NET: 1120 mL          PHYSICAL EXAM:    General: Well developed; well nourished; in no acute distress  HEENT: MMM, conjunctiva and sclera clear  H- RRR  L- rhonchi B/L  Gastrointestinal: Soft, non-tender non-distended; Normal bowel sounds; No rebound or guarding  Extremities: Normal range of motion, No clubbing, cyanosis or edema  Neurological: Alert and oriented x3  Skin: Warm and dry. No obvious rash      LABS:                        8.9    5.75  )-----------( 308      ( 01 Jan 2020 07:18 )             26.7     01 Jan 2020 07:18    131    |  89     |  19.0   ----------------------------<  104    3.8     |  31.0   |  0.87     Ca    8.1        01 Jan 2020 07:18  Phos  2.2       01 Jan 2020 07:18  Mg     1.2       01 Jan 2020 07:18                RADIOLOGY & ADDITIONAL STUDIES:     < from: CT Chest No Cont (12.30.19 @ 13:29) >  MPRESSION:     Findings consistent with multifocal pneumonia.    < end of copied text >

## 2020-01-02 NOTE — PROGRESS NOTE ADULT - SUBJECTIVE AND OBJECTIVE BOX
CC: Pre-syncope/Dysphagia/Multifocal PNA/Throat cancer    INTERVAL HPI/OVERNIGHT EVENTS: Patient seen and examined. PEG tube tip replaced by GI this am. Patient verbalized concern over living alone, does not feel he can take care of himself. RN attempted to teach bolus feeds, patient not able to perform. Managing oral secretions with suction catheter. Throat pain improved with Magic Mouth wash. Denies chest pain, SOB, dizziness, nausea, vomiting, fever, chills. +ARAMBULA.     Vital Signs Last 24 Hrs  T(C): 36.7 (02 Jan 2020 07:43), Max: 36.7 (01 Jan 2020 16:28)  T(F): 98 (02 Jan 2020 07:43), Max: 98.1 (01 Jan 2020 16:28)  HR: 88 (02 Jan 2020 07:43) (71 - 89)  BP: 121/63 (02 Jan 2020 07:43) (119/69 - 121/63)  BP(mean): --  RR: 18 (02 Jan 2020 08:50) (18 - 18)  SpO2: 87% (02 Jan 2020 08:50) (87% - 98%)    PHYSICAL EXAM:    GENERAL: NAD, sitting in chair  CHEST/LUNG: diminished air entry BL. rales heard   HEART: S1S2+, Regular rate and rhythm; No murmurs, rubs, or gallops  ABDOMEN: Soft, Nontender, Nondistended; Bowel sounds present  EXTREMITIES:  no pitting edema , pulses palpated BL.      I&O's Detail    01 Jan 2020 07:01  -  02 Jan 2020 07:00  --------------------------------------------------------  IN:    Free Water: 400 mL    ns in tub fed  [id number]: 720 mL  Total IN: 1120 mL    OUT:  Total OUT: 0 mL    Total NET: 1120 mL      02 Jan 2020 07:01  -  02 Jan 2020 13:19  --------------------------------------------------------  IN:    ns in tub fed  [id number]: 240 mL  Total IN: 240 mL    OUT:    Voided: 150 mL  Total OUT: 150 mL    Total NET: 90 mL                                    8.8    6.27  )-----------( 342      ( 02 Jan 2020 08:21 )             27.4     02 Jan 2020 08:21    130    |  88     |  19.0   ----------------------------<  95     4.1     |  31.0   |  0.97     Ca    8.3        02 Jan 2020 08:21  Phos  2.2       02 Jan 2020 08:21  Mg     1.2       02 Jan 2020 08:21        CAPILLARY BLOOD GLUCOSE              MEDICATIONS  (STANDING):  FIRST- Mouthwash  BLM 5 milliLiter(s) Swish and Spit four times a day  heparin  Injectable 5000 Unit(s) SubCutaneous every 8 hours  levoFLOXacin IVPB 750 milliGRAM(s) IV Intermittent every 24 hours  magnesium oxide 400 milliGRAM(s) Oral three times a day with meals  sodium chloride 0.65% Nasal 1 Spray(s) Both Nostrils four times a day  sodium chloride 0.9%. 1000 milliLiter(s) (75 mL/Hr) IV Continuous <Continuous>  sodium chloride 3%  Inhalation 4 milliLiter(s) Inhalation three times a day    MEDICATIONS  (PRN):  acetaminophen   Tablet .. 650 milliGRAM(s) Oral every 6 hours PRN Temp greater or equal to 38C (100.4F), Mild Pain (1 - 3)  ondansetron    Tablet 8 milliGRAM(s) Oral every 8 hours PRN Nausea and/or Vomiting      RADIOLOGY & ADDITIONAL TESTS:
CC: Pre-syncope/Sepsis/Throat Cancer    INTERVAL HPI/OVERNIGHT EVENTS: Patient seen and examined. C/O sore throat, has difficulty managing oral secretions. Denies chest pain, SOB, dizziness, nausea, vomiting, fever, chills. Did not sleep well overnight. +moist cough    Vital Signs Last 24 Hrs  T(C): 36.9 (30 Dec 2019 08:46), Max: 37.3 (29 Dec 2019 15:33)  T(F): 98.4 (30 Dec 2019 08:46), Max: 99.1 (29 Dec 2019 15:33)  HR: 80 (30 Dec 2019 09:09) (76 - 82)  BP: 120/62 (30 Dec 2019 08:46) (113/67 - 122/73)  BP(mean): --  RR: 18 (30 Dec 2019 08:46) (18 - 18)  SpO2: 93% (30 Dec 2019 09:09) (90% - 100%)      PE:  PHYSICAL EXAM:    GENERAL: NAD, resting comfortable in bed   HEENT: Patchy areas oral pharynx, green secretions. Increase oral secretions  CHEST/LUNG: Coarse BS B/L, clears with cough  HEART: S1S2+, Regular rate and rhythm; No murmurs, rubs, or gallops  ABDOMEN: Soft, Nontender, Nondistended; Bowel sounds present  EXTREMITIES:  no pitting edema , pulses palpated BL.    I&O's Detail    29 Dec 2019 07:01  -  30 Dec 2019 07:00  --------------------------------------------------------  IN:  Total IN: 0 mL    OUT:    Voided: 700 mL  Total OUT: 700 mL    Total NET: -700 mL                                    7.1    3.80  )-----------( 263      ( 30 Dec 2019 08:59 )             21.8     30 Dec 2019 08:59    134    |  95     |  26.0   ----------------------------<  110    3.4     |  27.0   |  1.02     Ca    8.3        30 Dec 2019 08:59  Mg     1.4       30 Dec 2019 08:59        CAPILLARY BLOOD GLUCOSE              MEDICATIONS  (STANDING):  cefepime   IVPB 2000 milliGRAM(s) IV Intermittent every 12 hours  FIRST- Mouthwash  BLM 5 milliLiter(s) Swish and Spit four times a day  guaiFENesin   Syrup  (Sugar-Free) 100 milliGRAM(s) Oral every 6 hours  heparin  Injectable 5000 Unit(s) SubCutaneous every 8 hours  magnesium oxide 400 milliGRAM(s) Oral three times a day with meals  magnesium sulfate  IVPB 2 Gram(s) IV Intermittent once  potassium chloride   Powder 20 milliEquivalent(s) Enteral Tube two times a day  potassium chloride  10 mEq/100 mL IVPB 10 milliEquivalent(s) IV Intermittent every 1 hour  sodium chloride 0.9%. 1000 milliLiter(s) (50 mL/Hr) IV Continuous <Continuous>  sodium chloride 3%  Inhalation 4 milliLiter(s) Inhalation three times a day  vancomycin  IVPB 1000 milliGRAM(s) IV Intermittent every 12 hours    MEDICATIONS  (PRN):  acetaminophen   Tablet .. 650 milliGRAM(s) Oral every 6 hours PRN Temp greater or equal to 38C (100.4F), Mild Pain (1 - 3)  ondansetron    Tablet 8 milliGRAM(s) Oral every 8 hours PRN Nausea and/or Vomiting      RADIOLOGY & ADDITIONAL TESTS:
CC: Pre-syncope/Sepsis/Throat Cancer    INTERVAL HPI/OVERNIGHT EVENTS: Patient seen and examined. Self suctioning oral secretions. Feeling some improvement in throat pain with Magic mouthwash. Denies chest pain, SOB, dizziness, nausea, vomiting, fever, chills. Requesting bolus feeds, wants to walk without being tethered to tube feeds pump. Feels improved after blood transfusion.     Vital Signs Last 24 Hrs  T(C): 36.7 (31 Dec 2019 08:15), Max: 37.1 (31 Dec 2019 00:34)  T(F): 98 (31 Dec 2019 08:15), Max: 98.7 (31 Dec 2019 00:34)  HR: 80 (31 Dec 2019 08:15) (78 - 82)  BP: 167/74 (31 Dec 2019 08:15) (142/51 - 167/74)  BP(mean): --  RR: 18 (31 Dec 2019 08:15) (18 - 18)  SpO2: 93% (31 Dec 2019 08:15) (93% - 98%)    PE:  PHYSICAL EXAM:    GENERAL: NAD, resting comfortable in bed   HEENT: Patchy areas oral pharynx.  Increase oral secretions  CHEST/LUNG: Coarse BS B/L, clears with cough  HEART: S1S2+, Regular rate and rhythm; No murmurs, rubs, or gallops  ABDOMEN: Soft, Nontender, Nondistended; Bowel sounds present  EXTREMITIES:  no pitting edema , pulses palpated BL.      I&O's Detail                                8.8    4.94  )-----------( 280      ( 31 Dec 2019 07:32 )             25.4     31 Dec 2019 07:32    136    |  95     |  21.0   ----------------------------<  139    3.5     |  29.0   |  1.01     Ca    8.0        31 Dec 2019 07:32  Phos  2.4       31 Dec 2019 07:32  Mg     1.6       31 Dec 2019 07:32        CAPILLARY BLOOD GLUCOSE              MEDICATIONS  (STANDING):  cefepime   IVPB 2000 milliGRAM(s) IV Intermittent every 12 hours  FIRST- Mouthwash  BLM 5 milliLiter(s) Swish and Spit four times a day  heparin  Injectable 5000 Unit(s) SubCutaneous every 8 hours  magnesium oxide 400 milliGRAM(s) Oral three times a day with meals  potassium chloride   Powder 20 milliEquivalent(s) Enteral Tube two times a day  sodium chloride 3%  Inhalation 4 milliLiter(s) Inhalation three times a day  vancomycin  IVPB 750 milliGRAM(s) IV Intermittent every 12 hours    MEDICATIONS  (PRN):  acetaminophen   Tablet .. 650 milliGRAM(s) Oral every 6 hours PRN Temp greater or equal to 38C (100.4F), Mild Pain (1 - 3)  ondansetron    Tablet 8 milliGRAM(s) Oral every 8 hours PRN Nausea and/or Vomiting      RADIOLOGY & ADDITIONAL TESTS:
CC: multifocal PNA     INTERVAL HPI/OVERNIGHT EVENTS: seen and examined , still having throat discomfort , difficulty with secretions , refused scopolamine patch . also mentioned that his PEG tube has been broken on top , requested for GI to evaluate .     REVIEW OF SYSTEMS:    CONSTITUTIONAL: feels tired , cough , throat discomfort   RESPIRATORY: cough , wet , phlegm   CARDIOVASCULAR: No chest pain, palpitations  GASTROINTESTINAL: No abdominal or epigastric pain. No nausea, vomiting  NEUROLOGICAL: No headaches, or blurry vision       Vital Signs Last 24 Hrs  T(C): 37.1 (01 Jan 2020 07:45), Max: 37.1 (01 Jan 2020 00:46)  T(F): 98.8 (01 Jan 2020 07:45), Max: 98.8 (01 Jan 2020 07:45)  HR: 55 (01 Jan 2020 07:45) (55 - 79)  BP: 137/69 (01 Jan 2020 07:45) (137/69 - 156/80)  BP(mean): --  RR: 18 (01 Jan 2020 07:45) (18 - 18)  SpO2: 92% (01 Jan 2020 07:45) (92% - 98%)    PHYSICAL EXAM:    GENERAL: NAD, resting comfortable in bed   CHEST/LUNG: diminished air entry BL. rales heard   HEART: S1S2+, Regular rate and rhythm; No murmurs, rubs, or gallops  ABDOMEN: Soft, Nontender, Nondistended; Bowel sounds present  EXTREMITIES:  no pitting edema , pulses palpated BL.        LABS:                        8.9    5.75  )-----------( 308      ( 01 Jan 2020 07:18 )             26.7     01-01    131<L>  |  89<L>  |  19.0  ----------------------------<  104  3.8   |  31.0<H>  |  0.87    Ca    8.1<L>      01 Jan 2020 07:18  Phos  2.2     01-01  Mg     1.2     01-01              MEDICATIONS  (STANDING):  FIRST- Mouthwash  BLM 5 milliLiter(s) Swish and Spit four times a day  heparin  Injectable 5000 Unit(s) SubCutaneous every 8 hours  levoFLOXacin IVPB 750 milliGRAM(s) IV Intermittent every 24 hours  magnesium oxide 400 milliGRAM(s) Oral three times a day with meals  potassium chloride   Powder 20 milliEquivalent(s) Enteral Tube two times a day  sodium chloride 3%  Inhalation 4 milliLiter(s) Inhalation three times a day    MEDICATIONS  (PRN):  acetaminophen   Tablet .. 650 milliGRAM(s) Oral every 6 hours PRN Temp greater or equal to 38C (100.4F), Mild Pain (1 - 3)  ondansetron    Tablet 8 milliGRAM(s) Oral every 8 hours PRN Nausea and/or Vomiting      RADIOLOGY & ADDITIONAL TESTS:
CC: viral illness     INTERVAL HPI/OVERNIGHT EVENTS: seen and examined , positive for entero/rhinovirus on RVP , code sepsis noted , ID consult requested .    REVIEW OF SYSTEMS:    CONSTITUTIONAL: feels tired , has swallowing difficulties   RESPIRATORY: cough , SOB   CARDIOVASCULAR: No chest pain, palpitations  GASTROINTESTINAL: No abdominal or epigastric pain. No nausea, vomiting  NEUROLOGICAL: No headaches, or blurry vision       Vital Signs Last 24 Hrs  T(C): 36.9 (28 Dec 2019 11:43), Max: 38.2 (27 Dec 2019 22:24)  T(F): 98.4 (28 Dec 2019 11:43), Max: 100.7 (27 Dec 2019 22:24)  HR: 84 (28 Dec 2019 11:43) (81 - 103)  BP: 123/70 (28 Dec 2019 11:43) (90/54 - 127/70)  BP(mean): 69 (28 Dec 2019 00:52) (69 - 89)  RR: 18 (28 Dec 2019 11:43) (18 - 20)  SpO2: 100% (28 Dec 2019 11:43) (83% - 100%)    PHYSICAL EXAM:    GENERAL: NAD, resting comfortable in bed, chronically ill    CHEST/LUNG: diminished air entry on right , left within normal limits   HEART: S1S2+, Regular rate and rhythm; No murmurs, rubs, or gallops  ABDOMEN: Soft, Nontender, Nondistended; Bowel sounds present  EXTREMITIES:  no pitting edema , pulses palpated BL.        LABS:                        8.7    x     )-----------( 197      ( 28 Dec 2019 11:56 )             25.8         129<L>  |  88<L>  |  32.0<H>  ----------------------------<  101  3.5   |  26.0  |  1.59<H>    Ca    8.1<L>      28 Dec 2019 07:32  Mg     1.4         TPro  6.3<L>  /  Alb  3.2<L>  /  TBili  0.9  /  DBili  x   /  AST  29  /  ALT  24  /  AlkPhos  121<H>  -    PT/INR - ( 27 Dec 2019 17:33 )   PT: 16.3 sec;   INR: 1.40 ratio         PTT - ( 27 Dec 2019 17:33 )  PTT:29.1 sec  Urinalysis Basic - ( 28 Dec 2019 11:53 )    Color: Yellow / Appearance: Clear / S.020 / pH: x  Gluc: x / Ketone: Trace  / Bili: Small / Urobili: 4 mg/dL   Blood: x / Protein: 30 mg/dL / Nitrite: Negative   Leuk Esterase: Trace / RBC: x / WBC x   Sq Epi: x / Non Sq Epi: x / Bacteria: x          MEDICATIONS  (STANDING):  cefepime   IVPB 2000 milliGRAM(s) IV Intermittent every 12 hours  heparin  Injectable 5000 Unit(s) SubCutaneous every 8 hours  magnesium oxide 400 milliGRAM(s) Oral three times a day with meals  sodium chloride 0.9%. 1000 milliLiter(s) (100 mL/Hr) IV Continuous <Continuous>    MEDICATIONS  (PRN):  acetaminophen   Tablet .. 650 milliGRAM(s) Oral every 6 hours PRN Temp greater or equal to 38C (100.4F), Mild Pain (1 - 3)  ondansetron    Tablet 8 milliGRAM(s) Oral every 8 hours PRN Nausea and/or Vomiting      RADIOLOGY & ADDITIONAL TESTS:
[facility]  INFECTIOUS DISEASES AND INTERNAL MEDICINE at [place]  =======================================================  Erasmo Mitchell MD  Diplomates American Board of Internal Medicine and Infectious Diseases  Tel: [phone]      Fax: [phone]  =======================================================    LINDSAY BRADLEY [id number]    Follow up: pneumonia  feeling better  says that peg tube is not connecting to tubing correctly and was leaking all night    Allergies:  No Known Allergies      Medications:  acetaminophen   Tablet .. 650 milliGRAM(s) Oral every 6 hours PRN  cefepime   IVPB 2000 milliGRAM(s) IV Intermittent every 12 hours  FIRST- Mouthwash  BLM 5 milliLiter(s) Swish and Spit four times a day  heparin  Injectable 5000 Unit(s) SubCutaneous every 8 hours  magnesium oxide 400 milliGRAM(s) Oral three times a day with meals  ondansetron    Tablet 8 milliGRAM(s) Oral every 8 hours PRN  potassium chloride   Powder 20 milliEquivalent(s) Enteral Tube two times a day  sodium chloride 3%  Inhalation 4 milliLiter(s) Inhalation three times a day  vancomycin  IVPB 750 milliGRAM(s) IV Intermittent every 12 hours            REVIEW OF SYSTEMS:  CONSTITUTIONAL:  No Fever or chills  HEENT:   No diplopia or blurred vision.  No earache, sore throat or runny nose.  CARDIOVASCULAR:  No pressure, squeezing, strangling, tightness, heaviness or aching about the chest, neck, axilla or epigastrium.  RESPIRATORY:  No cough, shortness of breath  GASTROINTESTINAL:  No nausea, vomiting or diarrhea.  GENITOURINARY:  No dysuria, frequency or urgency. No Blood in urine  MUSCULOSKELETAL:  no joint aches, no muscle pain  SKIN:  No change in skin, hair or nails.  NEUROLOGIC:  No Headaches, seizures or weakness.  PSYCHIATRIC:  No disorder of thought or mood.  ENDOCRINE:  No heat or cold intolerance  HEMATOLOGICAL:  No easy bruising or bleeding.            Physical Exam:  ICU Vital Signs Last 24 Hrs  T(C): 36.7 (31 Dec 2019 08:15), Max: 37.1 (31 Dec 2019 00:34)  T(F): 98 (31 Dec 2019 08:15), Max: 98.7 (31 Dec 2019 00:34)  HR: 80 (31 Dec 2019 08:15) (78 - 82)  BP: 167/74 (31 Dec 2019 08:15) (142/51 - 167/74)  BP(mean): --  ABP: --  ABP(mean): --  RR: 18 (31 Dec 2019 08:15) (18 - 18)  SpO2: 93% (31 Dec 2019 08:15) (93% - 98%)      GEN: NAD, pleasant  HEENT: normocephalic and atraumatic. EOMI. PERRL.  Anicteric   NECK: Supple.   LUNGS: Clear to auscultation.  HEART: Regular rate and rhythm without murmur.  ABDOMEN: Soft, + peg nontender, and nondistended.  Positive bowel sounds.    : No CVA tenderness  EXTREMITIES: Without any edema.  MSK: no joint swelling  NEUROLOGIC: Cranial nerves II through XII are grossly intact. No focal deficits  PSYCHIATRIC: Appropriate affect .  SKIN: No Rash      Labs:  12-31    136  |  95<L>  |  21.0<H>  ----------------------------<  139<H>  3.5   |  29.0  |  1.01    Ca    8.0<L>      31 Dec 2019 07:32  Phos  2.4     12-31  Mg     1.6     12-31                            8.8    4.94  )-----------( 280      ( 31 Dec 2019 07:32 )             25.4                 CAPILLARY BLOOD GLUCOSE            RECENT CULTURES:  12-30 @ 14:32 .Throat     No growth at 1 day.  Culture in progress        12-29 @ 22:30 .Sputum     Numerous Gram Negative Rods Identification and susceptibility to follow.  Few Routine respiratory libertad present  Culture in progress    Moderate White blood cells  Few Gram Positive Rods  Few Gram Negative Rods  Few Gram Positive Cocci in Pairs and Chains      12-27 @ 23:55          Detected  12-27 @ 22:58 .Blood     No growth at 48 hours        12-27 @ 22:57 .Blood Coag Negative Staphylococcus  Blood Culture PCR    Growth in anaerobic bottle: Coag Negative Staphylococcus  Anaerobic Bottle: 21:50 Hours to positivity  Aerobic Bottle: No growth to date  .  ***Blood Panel PCR results on this specimen are available  approximately 3 hours after the Gram stain result.***  Gram stain, PCR, and/or culture results may not always  correspond due to difference in methodologies.  ************************************************************  This PCR assay was performed using [email or web address].  The following targets are tested for: Enterococcus,  vancomycin resistant enterococci, Listeria monocytogenes,  coagulase negative staphylococci, S. aureus,  methicillin resistant S. aureus, Streptococcus agalactiae  (Group B), S. pneumoniae, S. pyogenes (Group A),  Acinetobacter baumannii, Enterobacter cloacae, E. coli,  Klebsiella oxytoca, K. pneumoniae, Proteus sp.,  Serratia marcescens, Haemophilus influenzae,  Neisseria meningitidis, Pseudomonas aeruginosa, Candida  albicans, C. glabrata, C krusei, C parapsilosis,  C. tropicalis and the KPC resistance gene.  "Due to technical problems, Proteus sp. will Not be reported as part of  the BCID panel until further notice"  .  TYPE: (C=Critical, N=Notification, A=Abnormal) C  TESTS:  _ Positive Blood GS  DATE/TIME CALLED: _ 12/29/2019 10:20  CALLED TO: _ 3TWR: Dorothy Owens RN  READ BACK (2 Patient Identifiers)(Y/N): _ Y  READ BACK VALUES (Y/N): _ Y  CALLED BY: Isai coffman
LINDSAY BRADLEY, 79y Male  MRN: [id number]  ATTENDING: Amanda Amaral    HPI:  79F diagnosed with left tonsillar cancer, diagnosed in October 2019, s/p 5 cycles of weekly Cisplatin/ XRT, who sustained a fall/ head trauma. Patient is s/p peg placement recently. Last seen in oncologist's  ( Dr. Vidal) 's office on 12/23/19, referred to ED at [facility]. CT head 12/27/19 unremarkable. CT and PET done in October 22, and 2 respectively consistent with large left tonsilar soft tissue mass ( SUV 52.5) extending to soft palate, nasopharynx, lateral oropharyngeal wall, and multiple FDG- avid left level II cervical lymph nodes. No other reported constitutional symptoms.    MEDICATIONS:  acetaminophen   Tablet .. 650 milliGRAM(s) Oral every 6 hours PRN  cefepime   IVPB 2000 milliGRAM(s) IV Intermittent every 12 hours  guaiFENesin   Syrup  (Sugar-Free) 100 milliGRAM(s) Oral every 6 hours  heparin  Injectable 5000 Unit(s) SubCutaneous every 8 hours  magnesium oxide 400 milliGRAM(s) Oral three times a day with meals  ondansetron    Tablet 8 milliGRAM(s) Oral every 8 hours PRN  sodium chloride 0.9%. 1000 milliLiter(s) IV Continuous <Continuous>  sodium chloride 3%  Inhalation 4 milliLiter(s) Inhalation three times a day  vancomycin  IVPB 1000 milliGRAM(s) IV Intermittent every 12 hours    All other medications reviewed.    SUBJECTIVE:  Patient stable, denies headache, blurred vision.  Aware unremarkable head CT.  No new falls.    VITALS:  T(C): 37.3 (12-29-19 @ 15:33), Max: 37.3 (12-29-19 @ 15:33)  T(F): 99.1 (12-29-19 @ 15:33), Max: 99.1 (12-29-19 @ 15:33)  HR: 82 (12-29-19 @ 15:33) (78 - 88)  BP: 122/73 (12-29-19 @ 15:33) (118/63 - 136/81)    PHYSICAL EXAM:  Respiratory: bilateral clear to auscultation  Cardiovascular : S1, S2 rhythmic  Abdomen: soft, distended, + BS  Extremities: no tenderness;  -c/c/e    LABS:  (12-29) WBC: 2.95 K/uL,Hemoglobin: 7.4 g/dL, Hematocrit: 21.4 %,  Platelet: 252 K/uL  (12-29) Na: 134 mmol/L ; K: 3.3 mmol/L ; BUN: 30.0 mg/dL ; Cr: 1.29 mg/dL.    RADIOLOGY:    CT Head No Cont (12.27.19 @ 20:17)   Impression:  1. Unremarkable noncontrast CT scan of the brain.
[facility]  INFECTIOUS DISEASES AND INTERNAL MEDICINE at [place]  =======================================================  Erasmo Mitchell MD  Diplomates American Board of Internal Medicine and Infectious Diseases  Tel: [phone]      Fax: [phone]  =======================================================    LINDSAY BRADLEY [id number]    Follow up: pneumonia  feels well  anxious about missing work    Allergies:  No Known Allergies      Medications:  acetaminophen   Tablet .. 650 milliGRAM(s) Oral every 6 hours PRN  FIRST- Mouthwash  BLM 5 milliLiter(s) Swish and Spit four times a day  heparin  Injectable 5000 Unit(s) SubCutaneous every 8 hours  levoFLOXacin IVPB 750 milliGRAM(s) IV Intermittent every 24 hours  magnesium oxide 400 milliGRAM(s) Oral three times a day with meals  ondansetron    Tablet 8 milliGRAM(s) Oral every 8 hours PRN  sodium chloride 0.65% Nasal 1 Spray(s) Both Nostrils four times a day  sodium chloride 0.9%. 1000 milliLiter(s) IV Continuous <Continuous>  sodium chloride 3%  Inhalation 4 milliLiter(s) Inhalation three times a day            REVIEW OF SYSTEMS:  CONSTITUTIONAL:  No Fever or chills  HEENT:   No diplopia or blurred vision.  No earache, sore throat or runny nose.  CARDIOVASCULAR:  No pressure, squeezing, strangling, tightness, heaviness or aching about the chest, neck, axilla or epigastrium.  RESPIRATORY:  No cough, shortness of breath  GASTROINTESTINAL:  No nausea, vomiting or diarrhea.  GENITOURINARY:  No dysuria, frequency or urgency. No Blood in urine  MUSCULOSKELETAL:  no joint aches, no muscle pain  SKIN:  No change in skin, hair or nails.  NEUROLOGIC:  No Headaches, seizures or weakness.  PSYCHIATRIC:  No disorder of thought or mood.  ENDOCRINE:  No heat or cold intolerance  HEMATOLOGICAL:  No easy bruising or bleeding.            Physical Exam:  ICU Vital Signs Last 24 Hrs  T(C): 36.7 (02 Jan 2020 07:43), Max: 36.7 (01 Jan 2020 16:28)  T(F): 98 (02 Jan 2020 07:43), Max: 98.1 (01 Jan 2020 16:28)  HR: 88 (02 Jan 2020 07:43) (71 - 89)  BP: 121/63 (02 Jan 2020 07:43) (119/69 - 121/63)  BP(mean): --  ABP: --  ABP(mean): --  RR: 18 (02 Jan 2020 08:50) (18 - 18)  SpO2: 87% (02 Jan 2020 08:50) (87% - 98%)      GEN: NAD, pleasant  HEENT: normocephalic and atraumatic. EOMI. PERRL.  Anicteric   NECK: Supple.   LUNGS: Clear to auscultation.  HEART: Regular rate and rhythm without murmur.  ABDOMEN: Soft, +peg nontender, and nondistended.  Positive bowel sounds.    : No CVA tenderness  EXTREMITIES: Without any edema.  MSK: no joint swelling  NEUROLOGIC: Cranial nerves II through XII are grossly intact. No focal deficits  PSYCHIATRIC: Appropriate affect .  SKIN: No Rash      Labs:  01-02    130<L>  |  88<L>  |  19.0  ----------------------------<  95  4.1   |  31.0<H>  |  0.97    Ca    8.3<L>      02 Jan 2020 08:21  Phos  2.2     01-02  Mg     1.2     01-02                            8.8    6.27  )-----------( 342      ( 02 Jan 2020 08:21 )             27.4                 CAPILLARY BLOOD GLUCOSE            RECENT CULTURES:  12-30 @ 14:32 .Throat     No Streptococcus pyogenes (Group A) isolated        12-29 @ 22:30 .Sputum Stenotrophomonas maltophilia    Numerous Stenotrophomonas maltophilia  Few Routine respiratory libertad present    Moderate White blood cells  Few Gram Positive Rods  Few Gram Negative Rods  Few Gram Positive Cocci in Pairs and Chains      12-29 @ 17:11 .Blood     No growth at 48 hours        12-29 @ 17:10 .Blood     No growth at 48 hours        12-27 @ 23:55          Detected  12-27 @ 22:58 .Blood     No growth at 5 days.        12-27 @ 22:57 .Blood Coag Negative Staphylococcus  Blood Culture PCR    Growth in anaerobic bottle: Coag Negative Staphylococcus  Anaerobic Bottle: 21:50 Hours to positivity  Aerobic Bottle: No growth at 5 days.  .  ***Blood Panel PCR results on this specimen are available  approximately 3 hours after the Gram stain result.***  Gram stain, PCR, and/or culture results may not always  correspond due to difference in methodologies.  ************************************************************  This PCR assay was performed using [employer].  The following targets are tested for: Enterococcus,  vancomycin resistant enterococci, Listeria monocytogenes,  coagulase negative staphylococci, S. aureus,  methicillin resistant S. aureus, Streptococcus agalactiae  (Group B), S. pneumoniae, S. pyogenes (Group A),  Acinetobacter baumannii, Enterobacter cloacae, E. coli,  Klebsiella oxytoca, K. pneumoniae, Proteus sp.,  Serratia marcescens, Haemophilus influenzae,  Neisseria meningitidis, Pseudomonas aeruginosa, Candida  albicans, C. glabrata, C krusei, C parapsilosis,  C. tropicalis and the KPC resistance gene.  "Due to technical problems, Proteus sp. will Not be reported as part of  the BCID panel until further notice"  .  TYPE: (C=Critical, N=Notification, A=Abnormal) C  TESTS:  _ Positive Blood GS  DATE/TIME CALLED: _ 12/29/2019 10:20  CALLED TO: _ 3TWR: Dorothy Owens RN  READ BACK (2 Patient Identifiers)(Y/N): _ Y  READ BACK VALUES (Y/N): _ Y  CALLED BY: Isai coffman
[facility]  INFECTIOUS DISEASES AND INTERNAL MEDICINE at [place]  =======================================================  Erasmo Mitchell MD  Diplomates American Board of Internal Medicine and Infectious Diseases  Tel: [phone]      Fax: [phone]  =======================================================    LINDSAY BRADLEY [id number]    Follow up: fever  afebrile overnight  feels well  BCX + Cons    Allergies:  No Known Allergies      Medications:  acetaminophen   Tablet .. 650 milliGRAM(s) Oral every 6 hours PRN  cefepime   IVPB 2000 milliGRAM(s) IV Intermittent every 12 hours  guaiFENesin   Syrup  (Sugar-Free) 100 milliGRAM(s) Oral every 6 hours  heparin  Injectable 5000 Unit(s) SubCutaneous every 8 hours  magnesium oxide 400 milliGRAM(s) Oral three times a day with meals  ondansetron    Tablet 8 milliGRAM(s) Oral every 8 hours PRN  sodium chloride 0.9%. 1000 milliLiter(s) IV Continuous <Continuous>  sodium chloride 3%  Inhalation 4 milliLiter(s) Inhalation three times a day            REVIEW OF SYSTEMS:  CONSTITUTIONAL:  No Fever or chills  HEENT:   No diplopia or blurred vision.  No earache, sore throat or runny nose.  CARDIOVASCULAR:  No pressure, squeezing, strangling, tightness, heaviness or aching about the chest, neck, axilla or epigastrium.  RESPIRATORY:  No cough, shortness of breath  GASTROINTESTINAL:  No nausea, vomiting or diarrhea.  GENITOURINARY:  No dysuria, frequency or urgency. No Blood in urine  MUSCULOSKELETAL:  no joint aches, no muscle pain  SKIN:  No change in skin, hair or nails.  NEUROLOGIC:  No Headaches, seizures or weakness.  PSYCHIATRIC:  No disorder of thought or mood.  ENDOCRINE:  No heat or cold intolerance  HEMATOLOGICAL:  No easy bruising or bleeding.            Physical Exam:  ICU Vital Signs Last 24 Hrs  T(C): 36.8 (29 Dec 2019 08:50), Max: 37 (29 Dec 2019 00:12)  T(F): 98.2 (29 Dec 2019 08:50), Max: 98.6 (29 Dec 2019 00:12)  HR: 78 (29 Dec 2019 08:50) (78 - 93)  BP: 118/63 (29 Dec 2019 08:50) (118/63 - 139/68)  BP(mean): --  ABP: --  ABP(mean): --  RR: 18 (29 Dec 2019 08:50) (18 - 18)  SpO2: 97% (29 Dec 2019 08:50) (97% - 99%)      GEN: NAD, pleasant  HEENT: normocephalic and atraumatic. EOMI. PERRL.  Anicteric   NECK: Supple.   LUNGS: Clear to auscultation.  HEART: Regular rate and rhythm without murmur.  ABDOMEN: Soft, + PEg nontender, and nondistended.  Positive bowel sounds.    : No CVA tenderness  EXTREMITIES: Without any edema.  MSK: no joint swelling  NEUROLOGIC: Cranial nerves II through XII are grossly intact. No focal deficits  PSYCHIATRIC: Appropriate affect .  SKIN: No Rash      Labs:      134<L>  |  94<L>  |  30.0<H>  ----------------------------<  133<H>  3.3<L>   |  26.0  |  1.29    Ca    8.3<L>      29 Dec 2019 06:12  Mg     1.4         TPro  6.3<L>  /  Alb  3.2<L>  /  TBili  0.9  /  DBili  x   /  AST  29  /  ALT  24  /  AlkPhos  121<H>                            7.4    2.95  )-----------( 252      ( 29 Dec 2019 06:12 )             21.4       PT/INR - ( 27 Dec 2019 17:33 )   PT: 16.3 sec;   INR: 1.40 ratio         PTT - ( 27 Dec 2019 17:33 )  PTT:29.1 sec  Urinalysis Basic - ( 28 Dec 2019 11:53 )    Color: Yellow / Appearance: Clear / S.020 / pH: x  Gluc: x / Ketone: Trace  / Bili: Small / Urobili: 4 mg/dL   Blood: x / Protein: 30 mg/dL / Nitrite: Negative   Leuk Esterase: Trace / RBC: Negative /HPF / WBC 3-5   Sq Epi: x / Non Sq Epi: Negative / Bacteria: Rare      LIVER FUNCTIONS - ( 27 Dec 2019 17:33 )  Alb: 3.2 g/dL / Pro: 6.3 g/dL / ALK PHOS: 121 U/L / ALT: 24 U/L / AST: 29 U/L / GGT: x           CARDIAC MARKERS ( 27 Dec 2019 17:33 )  x     / 0.01 ng/mL / x     / x     / x          CAPILLARY BLOOD GLUCOSE            RECENT CULTURES:   @ 23:55          Detected   @ 22:57 .Blood     Growth in anaerobic bottle: Gram Positive Cocci in Clusters  Anaerobic Bottle: 21:50 Hours to positivity  Aerobic Bottle: No growth to date  .  ***Blood Panel PCR results on this specimen are available  approximately 3 hours after the Gram stain result.***  Gram stain, PCR, and/or culture results may not always  correspond due to difference in methodologies.  ************************************************************  This PCR assay was performed using [employer].  The following targets are tested for: Enterococcus,  vancomycin resistant enterococci, Listeria monocytogenes,  coagulase negative staphylococci, S. aureus,  methicillin resistant S. aureus, Streptococcus agalactiae  (Group B), S. pneumoniae, S. pyogenes (Group A),  Acinetobacter baumannii, Enterobacter cloacae, E. coli,  Klebsiella oxytoca, K. pneumoniae, Proteus sp.,  Serratia marcescens, Haemophilus influenzae,  Neisseria meningitidis, Pseudomonas aeruginosa, Candida  albicans, C. glabrata, C krusei, C parapsilosis,  C. tropicalis and the KPC resistance gene.  "Due to technical problems, Proteus sp. will Not be reported as part of  the BCID panel until further notice"  .  TYPE: (C=Critical, N=Notification, A=Abnormal) C  TESTS:  _ Positive Blood GS  DATE/TIME CALLED: _ 2019 10:20  CALLED TO: _ 3TWR: Dorothy Owens RN  READ BACK (2 Patient Identifiers)(Y/N): _ Y  READ BACK VALUES (Y/N): _ Y  CALLED BY: Isai coffman
[facility]  INFECTIOUS DISEASES AND INTERNAL MEDICINE at [place]  =======================================================  Erasmo Mitchell MD  Diplomates American Board of Internal Medicine and Infectious Diseases  Tel: [phone]      Fax: [phone]  =======================================================    LINDSAY BRADLEY [id number]    Follow up: fever12  FELLS WELL    Allergies:  No Known Allergies      Medications:  acetaminophen   Tablet .. 650 milliGRAM(s) Oral every 6 hours PRN  cefepime   IVPB 2000 milliGRAM(s) IV Intermittent every 12 hours  FIRST- Mouthwash  BLM 5 milliLiter(s) Swish and Spit four times a day  guaiFENesin   Syrup  (Sugar-Free) 100 milliGRAM(s) Oral every 6 hours  heparin  Injectable 5000 Unit(s) SubCutaneous every 8 hours  magnesium oxide 400 milliGRAM(s) Oral three times a day with meals  magnesium sulfate  IVPB 2 Gram(s) IV Intermittent once  ondansetron    Tablet 8 milliGRAM(s) Oral every 8 hours PRN  potassium chloride   Powder 20 milliEquivalent(s) Enteral Tube two times a day  sodium chloride 0.9%. 1000 milliLiter(s) IV Continuous <Continuous>  sodium chloride 3%  Inhalation 4 milliLiter(s) Inhalation three times a day  vancomycin  IVPB 1000 milliGRAM(s) IV Intermittent every 12 hours            REVIEW OF SYSTEMS:  CONSTITUTIONAL:  No Fever or chills  HEENT:   No diplopia or blurred vision.  No earache, sore throat or runny nose.  CARDIOVASCULAR:  No pressure, squeezing, strangling, tightness, heaviness or aching about the chest, neck, axilla or epigastrium.  RESPIRATORY:  No cough, shortness of breath  GASTROINTESTINAL:  No nausea, vomiting or diarrhea.  GENITOURINARY:  No dysuria, frequency or urgency. No Blood in urine  MUSCULOSKELETAL:  no joint aches, no muscle pain  SKIN:  No change in skin, hair or nails.  NEUROLOGIC:  No Headaches, seizures or weakness.  PSYCHIATRIC:  No disorder of thought or mood.  ENDOCRINE:  No heat or cold intolerance  HEMATOLOGICAL:  No easy bruising or bleeding.            Physical Exam:  ICU Vital Signs Last 24 Hrs  T(C): 37 (30 Dec 2019 20:17), Max: 37 (30 Dec 2019 20:17)  T(F): 98.6 (30 Dec 2019 20:17), Max: 98.6 (30 Dec 2019 20:17)  HR: 78 (30 Dec 2019 20:17) (76 - 80)  BP: 142/65 (30 Dec 2019 20:17) (113/67 - 142/65)  BP(mean): --  ABP: --  ABP(mean): --  RR: 18 (30 Dec 2019 20:17) (18 - 18)  SpO2: 98% (30 Dec 2019 20:17) (93% - 98%)      GEN: NAD, pleasant pharyngeal erythema  HEENT: normocephalic and atraumatic. EOMI. PERRL.  Anicteric   NECK: Supple.   LUNGS: Clear to auscultation.  HEART: Regular rate and rhythm without murmur.  ABDOMEN: Soft, peg 	nontender, and nondistended.  Positive bowel sounds.    : No CVA tenderness  EXTREMITIES: Without any edema.  MSK: no joint swelling  NEUROLOGIC: Cranial nerves II through XII are grossly intact. No focal deficits  PSYCHIATRIC: Appropriate affect .  SKIN: No Rash      Labs:  12-30    134<L>  |  95<L>  |  26.0<H>  ----------------------------<  110  3.4<L>   |  27.0  |  1.02    Ca    8.3<L>      30 Dec 2019 08:59  Mg     1.4     12-30                            7.1    3.80  )-----------( 263      ( 30 Dec 2019 08:59 )             21.8                 CAPILLARY BLOOD GLUCOSE            RECENT CULTURES:  12-29 @ 22:30 .Sputum       Moderate White blood cells  Few Gram Positive Rods  Few Gram Negative Rods  Few Gram Positive Cocci in Pairs and Chains      12-27 @ 23:55          Detected  12-27 @ 22:58 .Blood     No growth at 48 hours        12-27 @ 22:57 .Blood Coag Negative Staphylococcus  Blood Culture PCR    Growth in anaerobic bottle: Coag Negative Staphylococcus  Anaerobic Bottle: 21:50 Hours to positivity  Aerobic Bottle: No growth to date  .  ***Blood Panel PCR results on this specimen are available  approximately 3 hours after the Gram stain result.***  Gram stain, PCR, and/or culture results may not always  correspond due to difference in methodologies.  ************************************************************  This PCR assay was performed using [employer].  The following targets are tested for: Enterococcus,  vancomycin resistant enterococci, Listeria monocytogenes,  coagulase negative staphylococci, S. aureus,  methicillin resistant S. aureus, Streptococcus agalactiae  (Group B), S. pneumoniae, S. pyogenes (Group A),  Acinetobacter baumannii, Enterobacter cloacae, E. coli,  Klebsiella oxytoca, K. pneumoniae, Proteus sp.,  Serratia marcescens, Haemophilus influenzae,  Neisseria meningitidis, Pseudomonas aeruginosa, Candida  albicans, C. glabrata, C krusei, C parapsilosis,  C. tropicalis and the KPC resistance gene.  "Due to technical problems, Proteus sp. will Not be reported as part of  the BCID panel until further notice"  .  TYPE: (C=Critical, N=Notification, A=Abnormal) C  TESTS:  _ Positive Blood GS  DATE/TIME CALLED: _ 12/29/2019 10:20  CALLED TO: _ 3TWR: Dorothy Owens RN  READ BACK (2 Patient Identifiers)(Y/N): _ Y  READ BACK VALUES (Y/N): _ Y  CALLED BY: Isai coffman
CC: sepsis     INTERVAL HPI/OVERNIGHT EVENTS: seen and examined , reported to have positive cultures , seen by keerthi BERGMAN. on lab review has anemia no acute blood loss, probably due to chemotherapy . started on tube feeds , electrolytes are still abnormal.     REVIEW OF SYSTEMS:    CONSTITUTIONAL: feels better   RESPIRATORY: cough and mild SOB   CARDIOVASCULAR: No chest pain, palpitations  GASTROINTESTINAL: No abdominal or epigastric pain. No nausea, vomiting  NEUROLOGICAL: No headaches, or blurry vision       Vital Signs Last 24 Hrs  T(C): 36.8 (29 Dec 2019 08:50), Max: 37 (29 Dec 2019 00:12)  T(F): 98.2 (29 Dec 2019 08:50), Max: 98.6 (29 Dec 2019 00:12)  HR: 78 (29 Dec 2019 08:50) (78 - 93)  BP: 118/63 (29 Dec 2019 08:50) (118/63 - 139/68)  BP(mean): --  RR: 18 (29 Dec 2019 08:50) (18 - 18)  SpO2: 97% (29 Dec 2019 08:50) (97% - 99%)    PHYSICAL EXAM:    GENERAL: NAD, resting comfortable in bed   CHEST/LUNG: diminished air entry BL.   HEART: S1S2+, Regular rate and rhythm; No murmurs, rubs, or gallops  ABDOMEN: Soft, Nontender, Nondistended; Bowel sounds present  EXTREMITIES:  no pitting edema , pulses palpated BL.        LABS:                        7.4    2.95  )-----------( 252      ( 29 Dec 2019 06:12 )             21.4     12-    134<L>  |  94<L>  |  30.0<H>  ----------------------------<  133<H>  3.3<L>   |  26.0  |  1.29    Ca    8.3<L>      29 Dec 2019 06:12  Mg     1.4     12-    TPro  6.3<L>  /  Alb  3.2<L>  /  TBili  0.9  /  DBili  x   /  AST  29  /  ALT  24  /  AlkPhos  121<H>  12-27    PT/INR - ( 27 Dec 2019 17:33 )   PT: 16.3 sec;   INR: 1.40 ratio         PTT - ( 27 Dec 2019 17:33 )  PTT:29.1 sec  Urinalysis Basic - ( 28 Dec 2019 11:53 )    Color: Yellow / Appearance: Clear / S.020 / pH: x  Gluc: x / Ketone: Trace  / Bili: Small / Urobili: 4 mg/dL   Blood: x / Protein: 30 mg/dL / Nitrite: Negative   Leuk Esterase: Trace / RBC: Negative /HPF / WBC 3-5   Sq Epi: x / Non Sq Epi: Negative / Bacteria: Rare          MEDICATIONS  (STANDING):  cefepime   IVPB 2000 milliGRAM(s) IV Intermittent every 12 hours  guaiFENesin   Syrup  (Sugar-Free) 100 milliGRAM(s) Oral every 6 hours  heparin  Injectable 5000 Unit(s) SubCutaneous every 8 hours  magnesium oxide 400 milliGRAM(s) Oral three times a day with meals  sodium chloride 0.9%. 1000 milliLiter(s) (100 mL/Hr) IV Continuous <Continuous>  sodium chloride 3%  Inhalation 4 milliLiter(s) Inhalation three times a day  vancomycin  IVPB 1000 milliGRAM(s) IV Intermittent every 12 hours    MEDICATIONS  (PRN):  acetaminophen   Tablet .. 650 milliGRAM(s) Oral every 6 hours PRN Temp greater or equal to 38C (100.4F), Mild Pain (1 - 3)  ondansetron    Tablet 8 milliGRAM(s) Oral every 8 hours PRN Nausea and/or Vomiting      RADIOLOGY & ADDITIONAL TESTS:

## 2020-01-02 NOTE — PROGRESS NOTE ADULT - REASON FOR ADMISSION
Pre-syncope

## 2020-01-02 NOTE — CONSULT NOTE ADULT - PROBLEM SELECTOR RECOMMENDATION 9
- dysphagia secondary to mass and secreations  - Tip of feeding tune was replaced  - PNA treatment  and bacteremia as per ID  - pt to F/U with Dr montemayor when  pt ready for PEG removal
Locally advanced tonsilar neoplasm, s/p  concurrent XRT/5 cycles Cisplatin, admitted s/p head trauma ; reviewed CT head - unremarkable. Will follow up with Dr. Vidal upon discharge from hospital.

## 2020-01-02 NOTE — PROGRESS NOTE ADULT - ATTENDING COMMENTS
seen and examined , agree with above .  sodium still low , will stop free water flushes , give normal saline .  placement discussed with case management .  ANAYELI is being considered .  PEG tube cap was replaced by GI

## 2020-01-02 NOTE — PROGRESS NOTE ADULT - ASSESSMENT
1. Sepsis   probably related to viral illness .  requested for ID consult.  on cefepime as patient is immunocompromised .    2. RAFAEL on CKD   due to dehydration .  improving .  will continue with fluids .    3. Acute hypoxic respiratory failure   on 2.5 L NC .  could be due to viral illness ?  CXR does not have infiltrate .  will maintain low threshold for obtaining CT if needed .  sputum culture has been ordered by ID team.     4. anemia / leukopenia   due to chemotherapy .  will monitor.  transfuse as needed .  will involve oncology team .    5. Hypomagnesemia  replace with IV supplementation .    6. Pre syncope /fall   due to dehydration .  will monitor.     7. Hyponatremia  due to dehydration , poor oral intake.  continue with fluids .  monitor sodium level.    8. Dysphagia   tube feeds to be started after nutritionist will evaluate .    9. Throat cancer   will involve oncology team .    10 . DVT prophylaxis  heparin .     details discussed with patient all concerns answered .
1. Sepsis / gram positive bacteremia/Multifocal PNA  culture showing Stenotrophomonas maltophilia   antibiotic switched to levaquin .  repeat blood culture negative likely it was contaminant .    2. RAFAEL on CKD   due to dehydration .  resolved   on tube feeds/free water    3. Acute hypoxic respiratory failure   on 2.5 L NC . Titrate to off if possible  concern due to PNA .  Chest CT with multifocal PNA    4. anemia / leukopenia   due to chemotherapy .  S/P transfusion one unit PRBC 12/30  seen by oncology team   monitor CBC    5. electrolyte abnormalities   replace, monitor lytes .      6. Pre syncope /fall   due to dehydration .  will monitor.     7. Hyponatremia  due to dehydration , poor oral intake.  still fluctuating , will monitor.     8. Dysphagia   was taking po minimally at home  on tube feeds here  Suction at bedside  Speech to follow, will need MBS if patient wishes to resume oral feeding.  PEG tube cap is broken , GI to evaluate in am .   patient still has increased secretions , refused scopolamine patch     9. Throat cancer   seen by oncology team , will need follow up outpatient     10. Severe Protein calorie malnutrition   Appreciate Nutrition input, Tube feeds changed to Bolus, started on 4 cans/day, goal is 6 cans daily    11 . DVT prophylaxis  heparin .     details discussed with patient .all concerns answered .
78 y/o male with PMH of throat ca on CT and RT came to the ED complaining of pre-syncope and fall. Patient said he was walking up the stairs and started feeling light headed, he fell forward and hit his head on the right. He called out to his wife who helped him up. Patient then went for his radiation therapy which was completed and later told to come to the ED for evaluation of fall. Patient reported that he has been having decrease PO intake due to the throat ca, recently had a PEG tube placement. He also noted cough sometimes productive of greenish sputum with blood streak occasionally.    Overall throat Ca on chemotherapy, fever, leukopenia, viral URI, hyponatremia, positive blood culture  - febrile to 100.7 F, with leukopenia but not neutropenic ?aspiration given difficulty swallowing and cough  - Blood cultures  + cons-possible procurement contaminant  - Repeat BCX and start vancomycin  - RVP + entero/rhino  - Continue Cefepime 2 g IV q12h  adjusted for renal function  - CT chest  - Sputum CX and urine legionella  - Trend Fever  - Trend Leukocytosis      Will Follow
78 y/o male with PMH of throat ca on CT and RT came to the ED complaining of pre-syncope and fall. Patient said he was walking up the stairs and started feeling light headed, he fell forward and hit his head on the right. He called out to his wife who helped him up. Patient then went for his radiation therapy which was completed and later told to come to the ED for evaluation of fall. Patient reported that he has been having decrease PO intake due to the throat ca, recently had a PEG tube placement. He also noted cough sometimes productive of greenish sputum with blood streak occasionally.    Overall throat Ca on chemotherapy, fever, leukopenia, viral URI, hyponatremia, positive blood culture  - febrile to 100.7 F, with leukopenia but not neutropenic ?aspiration given difficulty swallowing and cough  - Blood cultures  + cons-possible procurement contaminant  - Repeat BCX and start vancomycin  - RVP + entero/rhino  - Continue Cefepime 2 g IV q12h  adjusted for renal function  - CT chest  - Sputum CX and urine legionella  - Trend Fever  - Trend Leukocytosis  d/w Dr Amaral
78 y/o male with PMH of throat ca on CT and RT came to the ED complaining of pre-syncope and fall. Patient said he was walking up the stairs and started feeling light headed, he fell forward and hit his head on the right. He called out to his wife who helped him up. Patient then went for his radiation therapy which was completed and later told to come to the ED for evaluation of fall. Patient reported that he has been having decrease PO intake due to the throat ca, recently had a PEG tube placement. He also noted cough sometimes productive of greenish sputum with blood streak occasionally.    Overall throat Ca on chemotherapy, fever, leukopenia, viral URI, hyponatremia, positive blood culture, pneumonia  - feeling better  - Blood cultures  + cons-possible procurement contaminant  - Repeat BCX and c.w vancomycin until repeats are back  - RVP + entero/rhino  - Continue Cefepime 2 g IV q12h  adjusted for renal function  - CT chest with multifocal pneumonia  - Sputum CX GNR d/u ID+S  - negative urine legionella  - Trend Fever  - Trend Leukocytosis  - Gi f/u for malfunctioning peg tube    d/w Dr Amaral
78 y/o male with PMH of throat ca on CT and RT came to the ED complaining of pre-syncope and fall. Patient said he was walking up the stairs and started feeling light headed, he fell forward and hit his head on the right. He called out to his wife who helped him up. Patient then went for his radiation therapy which was completed and later told to come to the ED for evaluation of fall. Patient reported that he has been having decrease PO intake due to the throat ca, recently had a PEG tube placement. He also noted cough sometimes productive of greenish sputum. Has PEG tube since 11/15 and is currently undergoing chemo , last was 2 Fridays ago.          1. Sepsis / gram positive bacteremia   on vanc cefepime , CT chest pending .  sputum culture pending .  repeat cultures sent .  concern if this is related to PNA ?   also positive for enterovirus   Throat culture  Magic mouth wash QID, swish and spit    2. RAFAEL on CKD   due to dehydration .  improved ,   will continue with fluids     3. Acute hypoxic respiratory failure   on 2.5 L NC .  concern due to PNA .  follow up CT chest     4. anemia / leukopenia   due to chemotherapy .  HGB 7.1, transfuse one unit PRBC today  seen by oncology team     5. hypokalemia/hypomagnesemia  replace, monitor lytes   replace    6. Pre syncope /fall   due to dehydration .  will monitor.     7. Hyponatremia  due to dehydration , poor oral intake.  improved , continue with gentle hydration     8. Dysphagia   was taking po minimally at home  on tube feeds here  Suction at bedside  Speech eval    9. Throat cancer   seen by oncology team , will need follow up outpatient     10. Severe Protein calorie malnutrition   Appreciate Nutrition input, Tube feeds as per Recommendations    11 . DVT prophylaxis  heparin .
79F diagnosed with left tonsillar cancer, diagnosed in October 2019, s/p 5 cycles of weekly Cisplatin/ XRT, who sustained a fall/ head trauma. Patient is s/p peg placement recently. Last seen in oncologist's  ( Dr. Vidal) 's office on 12/23/19, referred to ED at [facility]. CT head 12/27/19 unremarkable. CT and PET done in October 22, and 2 respectively consistent with large left tonsilar soft tissue mass ( SUV 52.5) extending to soft palate, nasopharynx, lateral oropharyngeal wall, and multiple FDG- avid left level II cervical lymph nodes. No other reported constitutional symptoms.
80 y/o male with PMH of throat ca on CT and RT came to the ED complaining of pre-syncope and fall. Patient said he was walking up the stairs and started feeling light headed, he fell forward and hit his head on the right. He called out to his wife who helped him up. Patient then went for his radiation therapy which was completed and later told to come to the ED for evaluation of fall. Patient reported that he has been having decrease PO intake due to the throat ca, recently had a PEG tube placement. He also noted cough sometimes productive of greenish sputum with blood streak occasionally.    Overall throat Ca on chemotherapy, fever, leukopenia, viral URI, hyponatremia, positive blood culture, pneumonia  - feeling better  - Blood cultures  + cons-likely procurement contaminant  - Repeat BCX NGTD  - RVP + entero/rhino  - Legionella urine (-)  - CT chest with multifocal pneumonia  - Sputum CX Stenotrophomonous (S) to levaquin  - Cefepeme /vanco stopped  - Complete 7 days of levaquin 750 mg through 1/7/19. Can be switched to oral when ready for discharge  - Swallow eval per patient request    Will sign off.
80 y/o male with PMH of throat ca on CT and RT came to the ED complaining of pre-syncope and fall. Patient said he was walking up the stairs and started feeling light headed, he fell forward and hit his head on the right. He called out to his wife who helped him up. Patient then went for his radiation therapy which was completed and later told to come to the ED for evaluation of fall. Patient reported that he has been having decrease PO intake due to the throat ca, recently had a PEG tube placement. He also noted cough sometimes productive of greenish sputum. Has PEG tube since 11/15 and is currently undergoing chemo     1. Sepsis / gram positive bacteremia/Multifocal PNA  Sepsis resolved  culture showing Stenotrophomonas maltophilia   antibiotic switched to Levaquin   repeat blood culture negative likely it was contaminant .    2. RAFAEL on CKD   due to dehydration .  resolved   on tube feeds      3. Acute hypoxic respiratory failure   Chest CT with multifocal PNA  Requires supplemental oxygen, O2 saturation 87% on room air at rest.     4. anemia / leukopenia   due to chemotherapy .  S/P transfusion one unit PRBC 12/30  seen by oncology team   monitor CBC    5. electrolyte abnormalities   replace, monitor lytes .  IV normal saline x 24 hours      6. Pre syncope /fall   due to dehydration .  will monitor.     7. Hyponatremia  due to dehydration , poor oral intake.  still fluctuating , will monitor. IV NS x 24 hrs    8. Dysphagia   was taking po minimally at home  on tube feeds here  Suction at bedside  Speech to follow, will need MBS if patient wishes to resume oral feeding.  PEG tube cap is broken , replaced by GI   patient still has increased secretions , refused scopolamine patch     9. Throat cancer   discussed with Dr. Burciaga, unclear if patient will be receiving RT/CT in near future, will verify [facility] team [facility]    10. Severe Protein calorie malnutrition   Appreciate Nutrition input, Tube feeds changed to Bolus, started on 4 cans/day, advanced to 5 x daily, goal is 6 cans daily    11 . DVT prophylaxis  heparin .     Dispo: Discussion with Social Work, patient requesting [facility], will need to clarify plans for RT/CT.
80 y/o male with PMH of throat ca on CT and RT came to the ED complaining of pre-syncope and fall. Patient said he was walking up the stairs and started feeling light headed, he fell forward and hit his head on the right. He called out to his wife who helped him up. Patient then went for his radiation therapy which was completed and later told to come to the ED for evaluation of fall. Patient reported that he has been having decrease PO intake due to the throat ca, recently had a PEG tube placement. He also noted cough sometimes productive of greenish sputum. Has PEG tube since 11/15 and is currently undergoing chemo , last was 2 Fridays ago.          1. Sepsis / gram positive bacteremia/Multifocal PNA  on vanc cefepime   sputum culture GNR  Blood cultures  + cons-possible procurement contaminant  Repeat BCX 12/29, c.w vancomycin until repeats are back  also positive for enterovirus   Throat culture negative  Magic mouth wash QID, swish and spit    2. RAFAEL on CKD   due to dehydration .  improved ,   on tube feeds/free water    3. Acute hypoxic respiratory failure   on 2.5 L NC . Titrate to off if possible  concern due to PNA .  Chest CT with multifocal PNA    4. anemia / leukopenia   due to chemotherapy .  S/P transfusion one unit PRBC 12/30  seen by oncology team   monitor CBC    5. hypokalemia/hypomagnesemia  replace, monitor lytes       6. Pre syncope /fall   due to dehydration .  will monitor.     7. Hyponatremia  due to dehydration , poor oral intake.  improved , now on tube feeds/ free water via PEG    8. Dysphagia   was taking po minimally at home  on tube feeds here  Suction at bedside  Speech to follow, will need MBS if patient wishes to resume oral feeding    9. Throat cancer   seen by oncology team , will need follow up outpatient     10. Severe Protein calorie malnutrition   Appreciate Nutrition input, Tube feeds changed to Bolus, started on 4 cans/day, goal is 6 cans daily    11 . DVT prophylaxis  heparin .
1. Sepsis / gram positive bacteremia   on vanc cefepime , CT chest pending .  sputum culture pending .  repeat cultures sent .  concern if this is related to PNA ?   also positive for enterovirus     2. RAFAEL on CKD   due to dehydration .  improved ,   will continue with fluids but slow down the rate .    3. Acute hypoxic respiratory failure   on 2.5 L NC .  concern due to PNA .  follow up CT chest     4. anemia / leukopenia   due to chemotherapy .  will monitor.  transfuse as needed .  seen by oncology team     5. hypokalemia   replaced with IV supplementation     6. Pre syncope /fall   due to dehydration .  will monitor.     7. Hyponatremia  due to dehydration , poor oral intake.  improved , continue with gentle hydration     8. Dysphagia   on tube feeds     9. Throat cancer   seen by oncology team , will need follow up outpatient     10 . DVT prophylaxis  heparin .     details discussed with patient all concerns answered .

## 2020-01-03 ENCOUNTER — TRANSCRIPTION ENCOUNTER (OUTPATIENT)
Age: 80
End: 2020-01-03

## 2020-01-03 VITALS
HEART RATE: 68 BPM | RESPIRATION RATE: 18 BRPM | SYSTOLIC BLOOD PRESSURE: 135 MMHG | OXYGEN SATURATION: 96 % | DIASTOLIC BLOOD PRESSURE: 70 MMHG | TEMPERATURE: 98 F

## 2020-01-03 DIAGNOSIS — R13.10 DYSPHAGIA, UNSPECIFIED: ICD-10-CM

## 2020-01-03 LAB
ANION GAP SERPL CALC-SCNC: 11 MMOL/L — SIGNIFICANT CHANGE UP (ref 5–17)
BUN SERPL-MCNC: 22 MG/DL — HIGH (ref 8–20)
CALCIUM SERPL-MCNC: 8.1 MG/DL — LOW (ref 8.6–10.2)
CHLORIDE SERPL-SCNC: 92 MMOL/L — LOW (ref 98–107)
CO2 SERPL-SCNC: 29 MMOL/L — SIGNIFICANT CHANGE UP (ref 22–29)
CREAT SERPL-MCNC: 1 MG/DL — SIGNIFICANT CHANGE UP (ref 0.5–1.3)
CULTURE RESULTS: SIGNIFICANT CHANGE UP
CULTURE RESULTS: SIGNIFICANT CHANGE UP
GLUCOSE SERPL-MCNC: 88 MG/DL — SIGNIFICANT CHANGE UP (ref 70–115)
MAGNESIUM SERPL-MCNC: 1.5 MG/DL — LOW (ref 1.6–2.6)
PHOSPHATE SERPL-MCNC: 2.6 MG/DL — SIGNIFICANT CHANGE UP (ref 2.4–4.7)
POTASSIUM SERPL-MCNC: 4.5 MMOL/L — SIGNIFICANT CHANGE UP (ref 3.5–5.3)
POTASSIUM SERPL-SCNC: 4.5 MMOL/L — SIGNIFICANT CHANGE UP (ref 3.5–5.3)
SODIUM SERPL-SCNC: 132 MMOL/L — LOW (ref 135–145)
SPECIMEN SOURCE: SIGNIFICANT CHANGE UP
SPECIMEN SOURCE: SIGNIFICANT CHANGE UP

## 2020-01-03 PROCEDURE — 99285 EMERGENCY DEPT VISIT HI MDM: CPT | Mod: 25

## 2020-01-03 PROCEDURE — 83735 ASSAY OF MAGNESIUM: CPT

## 2020-01-03 PROCEDURE — 86850 RBC ANTIBODY SCREEN: CPT

## 2020-01-03 PROCEDURE — 87070 CULTURE OTHR SPECIMN AEROBIC: CPT

## 2020-01-03 PROCEDURE — 94640 AIRWAY INHALATION TREATMENT: CPT

## 2020-01-03 PROCEDURE — 93005 ELECTROCARDIOGRAM TRACING: CPT

## 2020-01-03 PROCEDURE — 85730 THROMBOPLASTIN TIME PARTIAL: CPT

## 2020-01-03 PROCEDURE — 85610 PROTHROMBIN TIME: CPT

## 2020-01-03 PROCEDURE — 87798 DETECT AGENT NOS DNA AMP: CPT

## 2020-01-03 PROCEDURE — 99239 HOSP IP/OBS DSCHRG MGMT >30: CPT

## 2020-01-03 PROCEDURE — 83605 ASSAY OF LACTIC ACID: CPT

## 2020-01-03 PROCEDURE — 87081 CULTURE SCREEN ONLY: CPT

## 2020-01-03 PROCEDURE — 87186 SC STD MICRODIL/AGAR DIL: CPT

## 2020-01-03 PROCEDURE — 85027 COMPLETE CBC AUTOMATED: CPT

## 2020-01-03 PROCEDURE — 97163 PT EVAL HIGH COMPLEX 45 MIN: CPT

## 2020-01-03 PROCEDURE — 80048 BASIC METABOLIC PNL TOTAL CA: CPT

## 2020-01-03 PROCEDURE — 87150 DNA/RNA AMPLIFIED PROBE: CPT

## 2020-01-03 PROCEDURE — 86900 BLOOD TYPING SEROLOGIC ABO: CPT

## 2020-01-03 PROCEDURE — 81001 URINALYSIS AUTO W/SCOPE: CPT

## 2020-01-03 PROCEDURE — 97530 THERAPEUTIC ACTIVITIES: CPT

## 2020-01-03 PROCEDURE — 97116 GAIT TRAINING THERAPY: CPT

## 2020-01-03 PROCEDURE — 87040 BLOOD CULTURE FOR BACTERIA: CPT

## 2020-01-03 PROCEDURE — 80202 ASSAY OF VANCOMYCIN: CPT

## 2020-01-03 PROCEDURE — 84484 ASSAY OF TROPONIN QUANT: CPT

## 2020-01-03 PROCEDURE — 71250 CT THORAX DX C-: CPT

## 2020-01-03 PROCEDURE — 86923 COMPATIBILITY TEST ELECTRIC: CPT

## 2020-01-03 PROCEDURE — 36430 TRANSFUSION BLD/BLD COMPNT: CPT

## 2020-01-03 PROCEDURE — 70450 CT HEAD/BRAIN W/O DYE: CPT

## 2020-01-03 PROCEDURE — 87633 RESP VIRUS 12-25 TARGETS: CPT

## 2020-01-03 PROCEDURE — 96374 THER/PROPH/DIAG INJ IV PUSH: CPT

## 2020-01-03 PROCEDURE — 87581 M.PNEUMON DNA AMP PROBE: CPT

## 2020-01-03 PROCEDURE — 84100 ASSAY OF PHOSPHORUS: CPT

## 2020-01-03 PROCEDURE — 71045 X-RAY EXAM CHEST 1 VIEW: CPT

## 2020-01-03 PROCEDURE — 36415 COLL VENOUS BLD VENIPUNCTURE: CPT

## 2020-01-03 PROCEDURE — 87449 NOS EACH ORGANISM AG IA: CPT

## 2020-01-03 PROCEDURE — 97110 THERAPEUTIC EXERCISES: CPT

## 2020-01-03 PROCEDURE — 80053 COMPREHEN METABOLIC PANEL: CPT

## 2020-01-03 PROCEDURE — 87486 CHLMYD PNEUM DNA AMP PROBE: CPT

## 2020-01-03 PROCEDURE — 86901 BLOOD TYPING SEROLOGIC RH(D): CPT

## 2020-01-03 PROCEDURE — P9016: CPT

## 2020-01-03 RX ORDER — ACETAMINOPHEN 500 MG
2 TABLET ORAL
Qty: 0 | Refills: 0 | DISCHARGE
Start: 2020-01-03

## 2020-01-03 RX ORDER — MAGNESIUM OXIDE 400 MG ORAL TABLET 241.3 MG
1 TABLET ORAL
Qty: 0 | Refills: 0 | DISCHARGE
Start: 2020-01-03

## 2020-01-03 RX ORDER — MAGNESIUM SULFATE 500 MG/ML
2 VIAL (ML) INJECTION ONCE
Refills: 0 | Status: COMPLETED | OUTPATIENT
Start: 2020-01-03 | End: 2020-01-03

## 2020-01-03 RX ORDER — DIPHENHYDRAMINE HYDROCHLORIDE AND LIDOCAINE HYDROCHLORIDE AND ALUMINUM HYDROXIDE AND MAGNESIUM HYDRO
5 KIT
Qty: 0 | Refills: 0 | DISCHARGE
Start: 2020-01-03

## 2020-01-03 RX ORDER — ONDANSETRON 8 MG/1
1 TABLET, FILM COATED ORAL
Qty: 0 | Refills: 0 | DISCHARGE
Start: 2020-01-03

## 2020-01-03 RX ORDER — SODIUM CHLORIDE 0.65 %
1 AEROSOL, SPRAY (ML) NASAL
Qty: 0 | Refills: 0 | DISCHARGE
Start: 2020-01-03

## 2020-01-03 RX ORDER — MAGNESIUM OXIDE 400 MG ORAL TABLET 241.3 MG
1 TABLET ORAL
Qty: 0 | Refills: 0 | DISCHARGE

## 2020-01-03 RX ADMIN — Medication 50 GRAM(S): at 08:15

## 2020-01-03 RX ADMIN — MAGNESIUM OXIDE 400 MG ORAL TABLET 400 MILLIGRAM(S): 241.3 TABLET ORAL at 07:48

## 2020-01-03 RX ADMIN — DIPHENHYDRAMINE HYDROCHLORIDE AND LIDOCAINE HYDROCHLORIDE AND ALUMINUM HYDROXIDE AND MAGNESIUM HYDRO 5 MILLILITER(S): KIT at 12:04

## 2020-01-03 RX ADMIN — DIPHENHYDRAMINE HYDROCHLORIDE AND LIDOCAINE HYDROCHLORIDE AND ALUMINUM HYDROXIDE AND MAGNESIUM HYDRO 5 MILLILITER(S): KIT at 00:36

## 2020-01-03 RX ADMIN — DIPHENHYDRAMINE HYDROCHLORIDE AND LIDOCAINE HYDROCHLORIDE AND ALUMINUM HYDROXIDE AND MAGNESIUM HYDRO 5 MILLILITER(S): KIT at 05:57

## 2020-01-03 RX ADMIN — SODIUM CHLORIDE 4 MILLILITER(S): 9 INJECTION INTRAMUSCULAR; INTRAVENOUS; SUBCUTANEOUS at 00:33

## 2020-01-03 RX ADMIN — Medication 1 SPRAY(S): at 12:04

## 2020-01-03 RX ADMIN — Medication 1 SPRAY(S): at 05:58

## 2020-01-03 RX ADMIN — MAGNESIUM OXIDE 400 MG ORAL TABLET 400 MILLIGRAM(S): 241.3 TABLET ORAL at 12:05

## 2020-01-03 RX ADMIN — HEPARIN SODIUM 5000 UNIT(S): 5000 INJECTION INTRAVENOUS; SUBCUTANEOUS at 05:58

## 2020-01-03 NOTE — DISCHARGE NOTE PROVIDER - NSDCCPCAREPLAN_GEN_ALL_CORE_FT
PRINCIPAL DISCHARGE DIAGNOSIS  Diagnosis: Near syncope  Assessment and Plan of Treatment: Due to dehydration- resolved  Continue Bolus tube feeds: Jevity 1.5 angel 6 cans per day  Monitor BMP in 2-3 days, then weekly  Free water      SECONDARY DISCHARGE DIAGNOSES  Diagnosis: Multifocal pneumonia  Assessment and Plan of Treatment: Complete antibiotics as prescribed  Supplemental oxygen- humidifies  Suction at bedside for oral self suctioning    Diagnosis: Hypomagnesemia  Assessment and Plan of Treatment: Continue supplements via PEG  Repeat BMP and lytes in 2-3 days and then weekly    Diagnosis: Hyponatremia  Assessment and Plan of Treatment: Maintain adequate hydration/feeding  Monitor BMP in 2-3 days, then weekly  Supplement as needed    Diagnosis: Symptomatic anemia  Assessment and Plan of Treatment: s/p transfusion one unit PRBC  monmitor CBC weekly

## 2020-01-03 NOTE — DISCHARGE NOTE PROVIDER - HOSPITAL COURSE
78 y/o male with PMH of throat ca on CT and RT came to the ED complaining of pre-syncope and fall. Patient said he was walking up the stairs and started feeling light headed, he fell forward and hit his head on the right. He called out to his wife who helped him up. Patient then went for his radiation therapy which was completed and later told to come to the ED for evaluation of fall. Patient reported that he has been having decrease PO intake due to the throat ca, recently had a PEG tube placement. He also noted cough sometimes productive of greenish sputum. Has PEG tube since 11/15 and is currently undergoing chemo. Fall /presyncope was trent to dehydration which has resolved. CT chest revealed multifocal PNA. Initial blood cultures   + cons-likely procurement contaminant. Sputum CX Stenotrophomonous (S) to levaquin. Received IV Levaquin and switched to oral to be completed on 1/7/2020. RAFAEL resolved with initiation of tube feeds and IV hydration. The patient requires supplemental oxygen and oral self suctioning for management of oral secretions. The patient received one unit of PRBC's for anemia due to chemotherapy. Electrolytes were monitored and replaced as needed. The patient was seen by SLP and recommend NPO until completion of treatment of PNA and will require MBS if wishes to resume oral feeding. PEG tube cap was replaced by GI as it was broken. As per discussion with Heme/Onc, the patient will need to follow up with Dr. Murdock after completion of antibiotics for further plan for RT/CT. The patient was seen by Dietician and recommend Jevity 1.5 angel, total of 6 cans daily bolus feeds. The patient is medically stable for discharge.         Vital Signs Last 24 Hrs    T(C): 36.4 (03 Jan 2020 08:00), Max: 36.6 (02 Jan 2020 15:43)    T(F): 97.5 (03 Jan 2020 08:00), Max: 97.9 (02 Jan 2020 15:43)    HR: 68 (03 Jan 2020 08:00) (68 - 84)    BP: 135/70 (03 Jan 2020 08:00) (134/68 - 135/70)    BP(mean): --    RR: 18 (03 Jan 2020 08:00) (18 - 18)    SpO2: 96% (03 Jan 2020 08:00) (92% - 96%)                                    8.8      6.27  )-----------( 342      ( 02 Jan 2020 08:21 )               27.4         03 Jan 2020 06:14        132    |  92     |  22.0     ----------------------------<  88       4.5     |  29.0   |  1.00         Ca    8.1        03 Jan 2020 06:14    Phos  2.6       03 Jan 2020 06:14    Mg     1.5       03 Jan 2020 06:14                CAPILLARY BLOOD GLUCOSE            PHYSICAL EXAM:        GENERAL: NAD, sitting in chair    CHEST/LUNG: diminished air entry BL, otherwise clear    HEART: S1S2+, Regular rate and rhythm; No murmurs, rubs, or gallops    ABDOMEN: Soft, Nontender, Nondistended; Bowel sounds present. PEG in place    EXTREMITIES:  no pitting edema , pulses palpated BL. 78 y/o male with PMH of throat ca on CT and RT came to the ED complaining of pre-syncope and fall. Patient said he was walking up the stairs and started feeling light headed, he fell forward and hit his head on the right. He called out to his wife who helped him up. Patient then went for his radiation therapy which was completed and later told to come to the ED for evaluation of fall. Patient reported that he has been having decrease PO intake due to the throat ca, recently had a PEG tube placement. He also noted cough sometimes productive of greenish sputum. Has PEG tube since 11/15 and is currently undergoing chemo. Fall /presyncope was trent to dehydration which has resolved. CT chest revealed multifocal PNA. Initial blood cultures   + cons-likely procurement contaminant. Sputum CX Stenotrophomonous (S) to levaquin. Received IV Levaquin and switched to oral to be completed on 1/7/2020. RAFAEL resolved with initiation of tube feeds and IV hydration. The patient requires supplemental oxygen and oral self suctioning for management of oral secretions. The patient received one unit of PRBC's for anemia due to chemotherapy. Electrolytes were monitored and replaced as needed. The patient was seen by SLP and recommend NPO until completion of treatment of PNA and will require MBS if wishes to resume oral feeding. PEG tube cap was replaced by GI as it was broken. As per discussion with Heme/Onc, the patient will need to follow up with Dr. Murdock after completion of antibiotics for further plan for RT/CT. The patient was seen by Dietician and recommend Jevity 1.5 angel, total of 6 cans daily bolus feeds. The patient is medically stable for discharge.         Vital Signs Last 24 Hrs    T(C): 36.4 (03 Jan 2020 08:00), Max: 36.6 (02 Jan 2020 15:43)    T(F): 97.5 (03 Jan 2020 08:00), Max: 97.9 (02 Jan 2020 15:43)    HR: 68 (03 Jan 2020 08:00) (68 - 84)    BP: 135/70 (03 Jan 2020 08:00) (134/68 - 135/70)    BP(mean): --    RR: 18 (03 Jan 2020 08:00) (18 - 18)    SpO2: 96% (03 Jan 2020 08:00) (92% - 96%)                                    8.8      6.27  )-----------( 342      ( 02 Jan 2020 08:21 )               27.4         03 Jan 2020 06:14        132    |  92     |  22.0     ----------------------------<  88       4.5     |  29.0   |  1.00         Ca    8.1        03 Jan 2020 06:14    Phos  2.6       03 Jan 2020 06:14    Mg     1.5       03 Jan 2020 06:14                CAPILLARY BLOOD GLUCOSE            PHYSICAL EXAM:        GENERAL: NAD, sitting in chair    CHEST/LUNG: diminished air entry BL, otherwise clear    HEART: S1S2+, Regular rate and rhythm; No murmurs, rubs, or gallops    ABDOMEN: Soft, Nontender, Nondistended; Bowel sounds present. PEG in place    EXTREMITIES:  no pitting edema , pulses palpated BL.        addendum :seen and examined , agree with above .    stable for discharge .    will need close follow up.    home oxygen set up , suction set up, may be trach in long term ?    will finish course of antibiotics .        35 minutes were spent on discharge co ordination

## 2020-01-03 NOTE — DISCHARGE NOTE NURSING/CASE MANAGEMENT/SOCIAL WORK - PATIENT PORTAL LINK FT
You can access the FollowMyHealth Patient Portal offered by [facility] by registering at the following website: [email or web address]. By joining [facility]’s FollowMyHealth portal, you will also be able to view your health information using other applications (apps) compatible with our system.

## 2020-01-03 NOTE — DISCHARGE NOTE PROVIDER - CARE PROVIDER_API CALL
Selena Vidal)  Medical Oncology  [facility], [address] [place] [address]  Phone: [phone]  Fax: [phone]  Follow Up Time:

## 2020-01-03 NOTE — DISCHARGE NOTE PROVIDER - INSTRUCTIONS
Pt will need a GOAL of 6 8oz cans / day of Jevity 1.5 to meet estimated needs.  to provide 1,440 ml, 2,160 kcal , 92 g protein, 1,094 ml of free water.   This will meet 100% est needs for macro and micronutrients.   Pt will need 1,396 additional ml of fluids to meet hydration needs.   4 oz water flush before and after each bolus would accomplish this.

## 2020-01-03 NOTE — DISCHARGE NOTE PROVIDER - NSDCFUSCHEDAPPT_GEN_ALL_CORE_FT
LINDSAY BRADLEY ; 01/06/2020 ; [facility]  LINDSAY BRADLEY ; 01/07/2020 ; [facility]  LINDSAY BRADLEY ; 01/13/2020 ; [facility] [address] [place] [address] LINDSAY BRADLEY ; 01/07/2020 ; [facility]  LINDSAY BRADLEY ; 01/13/2020 ; [facility]

## 2020-01-03 NOTE — DISCHARGE NOTE PROVIDER - NSDCMRMEDTOKEN_GEN_ALL_CORE_FT
acetaminophen 325 mg oral tablet: 2 tab(s) orally every 6 hours, As needed, Temp greater or equal to 38C (100.4F), Mild Pain (1 - 3)  diphenhydramine/lidocaine/aluminum hydroxide/magnesium hydroxide/simethicone mucous membrane suspension: 5 milliliter(s) mucous membrane 4 times a day swish and spit  Levaquin 750 mg oral tablet: 1 tab(s) orally every 24 hours last day 1/7/2020  magnesium oxide 400 mg (241.3 mg elemental magnesium) oral tablet: 1 tab(s) orally 3 times a day (with meals)  ondansetron 8 mg oral tablet: 1 tab(s) by gastrostomy tube every 8 hours, As Needed  sodium chloride 0.65% nasal spray: 1 spray(s) nasal 4 times a day

## 2020-01-06 ENCOUNTER — APPOINTMENT (OUTPATIENT)
Age: 80
End: 2020-01-06

## 2020-01-07 ENCOUNTER — RESULT REVIEW (OUTPATIENT)
Age: 80
End: 2020-01-07

## 2020-01-07 ENCOUNTER — APPOINTMENT (OUTPATIENT)
Dept: HEMATOLOGY ONCOLOGY | Facility: CLINIC | Age: 80
End: 2020-01-07
Payer: MEDICARE

## 2020-01-07 VITALS
HEIGHT: 73 IN | BODY MASS INDEX: 23.2 KG/M2 | SYSTOLIC BLOOD PRESSURE: 96 MMHG | HEART RATE: 85 BPM | OXYGEN SATURATION: 92 % | TEMPERATURE: 97.6 F | DIASTOLIC BLOOD PRESSURE: 62 MMHG | WEIGHT: 175.06 LBS

## 2020-01-07 VITALS
HEIGHT: 73 IN | OXYGEN SATURATION: 88 % | BODY MASS INDEX: 23.33 KG/M2 | WEIGHT: 176 LBS | DIASTOLIC BLOOD PRESSURE: 66 MMHG | RESPIRATION RATE: 16 BRPM | TEMPERATURE: 98 F | SYSTOLIC BLOOD PRESSURE: 100 MMHG | HEART RATE: 105 BPM

## 2020-01-07 LAB
BASOPHILS # BLD AUTO: 0.1 K/UL — SIGNIFICANT CHANGE UP (ref 0–0.2)
BASOPHILS NFR BLD AUTO: 1.5 % — SIGNIFICANT CHANGE UP (ref 0–2)
EOSINOPHIL # BLD AUTO: 0 K/UL — SIGNIFICANT CHANGE UP (ref 0–0.5)
EOSINOPHIL NFR BLD AUTO: 0.2 % — SIGNIFICANT CHANGE UP (ref 0–6)
HCT VFR BLD CALC: 28.7 % — LOW (ref 39–50)
HGB BLD-MCNC: 9.2 G/DL — LOW (ref 13–17)
LYMPHOCYTES # BLD AUTO: 0.7 K/UL — LOW (ref 1–3.3)
LYMPHOCYTES # BLD AUTO: 8.6 % — LOW (ref 13–44)
MCHC RBC-ENTMCNC: 29.9 PG — SIGNIFICANT CHANGE UP (ref 27–34)
MCHC RBC-ENTMCNC: 32.1 G/DL — SIGNIFICANT CHANGE UP (ref 32–36)
MCV RBC AUTO: 92.9 FL — SIGNIFICANT CHANGE UP (ref 80–100)
MONOCYTES # BLD AUTO: 0.8 K/UL — SIGNIFICANT CHANGE UP (ref 0–0.9)
MONOCYTES NFR BLD AUTO: 9.6 % — SIGNIFICANT CHANGE UP (ref 2–14)
NEUTROPHILS # BLD AUTO: 6.4 K/UL — SIGNIFICANT CHANGE UP (ref 1.8–7.4)
NEUTROPHILS NFR BLD AUTO: 80.1 % — HIGH (ref 43–77)
PLATELET # BLD AUTO: 472 K/UL — HIGH (ref 150–400)
RBC # BLD: 3.09 M/UL — LOW (ref 4.2–5.8)
RBC # FLD: 14.8 % — HIGH (ref 10.3–14.5)
WBC # BLD: 7.9 K/UL — SIGNIFICANT CHANGE UP (ref 3.8–10.5)
WBC # FLD AUTO: 7.9 K/UL — SIGNIFICANT CHANGE UP (ref 3.8–10.5)

## 2020-01-07 PROCEDURE — 77014: CPT | Mod: 26

## 2020-01-07 PROCEDURE — 99214 OFFICE O/P EST MOD 30 MIN: CPT

## 2020-01-07 PROCEDURE — 77427 RADIATION TX MANAGEMENT X5: CPT

## 2020-01-07 NOTE — PHYSICAL EXAM
[Restricted in physically strenuous activity but ambulatory and able to carry out work of a light or sedentary nature] : Status 1- Restricted in physically strenuous activity but ambulatory and able to carry out work of a light or sedentary nature, e.g., light house work, office work [Normal] : affect appropriate [de-identified] : Large left bulbus polypoid tonsillar mass  [de-identified] : No palpable cervical lymph nodes.

## 2020-01-07 NOTE — PHYSICAL EXAM
[Normal] : normoactive bowel sounds, soft and nontender, no hepatosplenomegaly or masses appreciated [de-identified] : moderate erythema b/l necks, dry desquamation improved [de-identified] : oropharyngeal mucositis

## 2020-01-07 NOTE — DISEASE MANAGEMENT
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

## 2020-01-07 NOTE — CONSULT LETTER
[Consult Letter:] : I had the pleasure of evaluating your patient, [unfilled]. [Dear  ___] : Dear  [unfilled], [Consult Closing:] : Thank you very much for allowing me to participate in the care of this patient.  If you have any questions, please do not hesitate to contact me. [Please see my note below.] : Please see my note below. [Sincerely,] : Sincerely, [DrKailyn  ___] : Dr. ROCHA [FreeTextEntry3] : Dr. Selena Vidal

## 2020-01-07 NOTE — HISTORY OF PRESENT ILLNESS
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  He reported having dysphagia and odynophagia since early August of this year.  He initially thought that this was related to recent dental work, and was treated with a course of antibiotics. However, his oral surgeon Dr. Nieves, noted a tonsillar lesion and referred him to ENT, Dr. Lira. CT scan of the neck 10/2/19 at [facility] showed a mass arising from the left tonsil with probable metastatic disease to the left neck. He was referred to Dr. Seymour who performed a biopsy on 10/16/19, pathology c/w SCC.  He had a staging PET on 10/22/19 which showed an FDG avid large soft tissue mass in the left palatine tonsillar region, SUV 52.5. Mass extends from the soft palate, nasopharynx and lateral oropharyngeal wall, to the level of the aryepiglottic fold, as noted on diagnostic CT.  Activity crosses the midline at the level of the nasopharynx and uvula. Multiple FDG avid left level II cervical nodes were present. [de-identified] : Patient presents s/p 5 cycles of CRT with weekly Cisplatin for SCC of the left tonsil.  + Odynophagia, still 4/10.  + Hoarseness.  + Decreased appetite but weight stable.  PEG placed 11/21, nutrition currently 100% via PEG.  + Loose stools.  + Xerostomia. + Mucositis.  Erythematous skin around neck much improved.  No excessive secretions.  Pain at PEG site resolved.  No fevers, no chills.  No D/C.  Nausea resolved. [de-identified] : He was smoking 2 cigars a day for the past 40 years, and quit with this diagnosis. He stopped drinking alcohol in 2007. Last colonoscopy in the 1970s.

## 2020-01-07 NOTE — REVIEW OF SYSTEMS
[Dysphagia: Grade 3 - Severely altered eating/swallowing; tube feeding or TPN or hospitalization indicated] : Dysphagia: Grade 3 - Severely altered eating/swallowing; tube feeding or TPN or hospitalization indicated [Mucositis Oral: Grade 2 - Moderate pain; not interfering with oral intake; modified diet indicated] : Mucositis Oral: Grade 2 - Moderate pain; not interfering with oral intake; modified diet indicated

## 2020-01-08 PROCEDURE — 77387B: CUSTOM | Mod: 26

## 2020-01-09 PROCEDURE — 77387B: CUSTOM | Mod: 26

## 2020-01-10 LAB
ALBUMIN SERPL ELPH-MCNC: 2.9 G/DL
ALP BLD-CCNC: 131 U/L
ALT SERPL-CCNC: 24 U/L
ANION GAP SERPL CALC-SCNC: 10 MMOL/L
AST SERPL-CCNC: 26 U/L
BILIRUB SERPL-MCNC: 0.2 MG/DL
BUN SERPL-MCNC: 30 MG/DL
CALCIUM SERPL-MCNC: 8.7 MG/DL
CHLORIDE SERPL-SCNC: 95 MMOL/L
CO2 SERPL-SCNC: 30 MMOL/L
CREAT SERPL-MCNC: 1.36 MG/DL
GLUCOSE SERPL-MCNC: 105 MG/DL
MAGNESIUM SERPL-MCNC: 1.8 MG/DL
POTASSIUM SERPL-SCNC: 5.9 MMOL/L
PROT SERPL-MCNC: 5.5 G/DL
SODIUM SERPL-SCNC: 135 MMOL/L

## 2020-01-10 PROCEDURE — 77387B: CUSTOM | Mod: 26

## 2020-01-10 PROCEDURE — 77427 RADIATION TX MANAGEMENT X5: CPT

## 2020-01-13 ENCOUNTER — APPOINTMENT (OUTPATIENT)
Dept: OTOLARYNGOLOGY | Facility: CLINIC | Age: 80
End: 2020-01-13

## 2020-01-13 VITALS
SYSTOLIC BLOOD PRESSURE: 110 MMHG | OXYGEN SATURATION: 98 % | RESPIRATION RATE: 16 BRPM | HEIGHT: 73 IN | WEIGHT: 315 LBS | DIASTOLIC BLOOD PRESSURE: 70 MMHG | BODY MASS INDEX: 41.75 KG/M2 | HEART RATE: 70 BPM | TEMPERATURE: 98.5 F

## 2020-01-13 PROCEDURE — 77387B: CUSTOM | Mod: 26

## 2020-01-13 NOTE — DISEASE MANAGEMENT
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

## 2020-01-13 NOTE — VITALS
[Least Pain Intensity: 0/10] : 0/10 [Maximal Pain Intensity: 2/10] : 2/10 [Pain Description/Quality: ___] : Pain description/quality: [unfilled] [Pain Location: ___] : Pain Location: [unfilled] [Pain Duration: ___] : Pain duration: [unfilled] [80: Normal activity with effort; some signs or symptoms of disease.] : 80: Normal activity with effort; some signs or symptoms of disease.  [Pain Interferes with ADLs] : Pain interferes with activities of daily living. [ECOG Performance Status: 2 - Ambulatory and capable of all self care but unable to carry out any work activities] : Performance Status: 2 - Ambulatory and capable of all self care but unable to carry out any work activities. Up and about more than 50% of waking hours

## 2020-01-13 NOTE — REVIEW OF SYSTEMS
[Dysphagia: Grade 3 - Severely altered eating/swallowing; tube feeding or TPN or hospitalization indicated] : Dysphagia: Grade 3 - Severely altered eating/swallowing; tube feeding or TPN or hospitalization indicated [Nausea: Grade 0] : Nausea: Grade 0 [Vomiting: Grade 0] : Vomiting: Grade 0 [Fatigue: Grade 2 - Fatigue not relieved by rest; limiting instrumental ADL] : Fatigue: Grade 2 - Fatigue not relieved by rest; limiting instrumental ADL [Mucositis Oral: Grade 2 - Moderate pain; not interfering with oral intake; modified diet indicated] : Mucositis Oral: Grade 2 - Moderate pain; not interfering with oral intake; modified diet indicated [Oral Pain: Grade 1 - Mild pain] : Oral Pain: Grade 1 - Mild pain [Xerostomia: Grade 1 - Symptomatic (e.g., dry or thick saliva) without significant dietary alteration; unstimulated saliva flow >0.2 ml/min] : Xerostomia: Grade 1 - Symptomatic (e.g., dry or thick saliva) without significant dietary alteration; unstimulated saliva flow >0.2 ml/min [Dysgeusia: Grade 1- Altered taste but no change in diet] : Dysgeusia: Grade 1 - Altered taste but no change in diet [Skin Atrophy: Grade 0] : Skin Atrophy: Grade 0 [Skin Hyperpigmentation: Grade 1 - Hyperpigmentation covering <10% BSA; no psychosocial impact] : Skin Hyperpigmentation: Grade 1 - Hyperpigmentation covering <10% BSA; no psychosocial impact [Dermatitis Radiation: Grade 1 - Faint erythema or dry desquamation] : Dermatitis Radiation: Grade 1 - Faint erythema or dry desquamation [FreeTextEntry6] : NPO

## 2020-01-13 NOTE — PHYSICAL EXAM
[General Appearance - Alert] : alert [Extraocular Movements] : extraocular movements were intact [Feeding Tube] : a feeding tube was present [Normal] : oriented to person, place and time, the affect was normal, the mood was normal and not anxious [de-identified] : oropharyngeal mucositis [de-identified] : moderate erythema b/l necks

## 2020-01-14 ENCOUNTER — RESULT REVIEW (OUTPATIENT)
Age: 80
End: 2020-01-14

## 2020-01-14 ENCOUNTER — LABORATORY RESULT (OUTPATIENT)
Age: 80
End: 2020-01-14

## 2020-01-14 ENCOUNTER — APPOINTMENT (OUTPATIENT)
Age: 80
End: 2020-01-14

## 2020-01-14 LAB
BASOPHILS # BLD AUTO: 0.2 K/UL — SIGNIFICANT CHANGE UP (ref 0–0.2)
BASOPHILS NFR BLD AUTO: 2.7 % — HIGH (ref 0–2)
BUN SERPL-MCNC: 25 MG/DL — HIGH (ref 7–23)
CA-I BLDA-SCNC: 1.25 MMOL/L — SIGNIFICANT CHANGE UP (ref 1.12–1.3)
CHLORIDE SERPL-SCNC: 98 MMOL/L — SIGNIFICANT CHANGE UP (ref 96–108)
CO2 SERPL-SCNC: 29 MMOL/L — SIGNIFICANT CHANGE UP (ref 22–31)
CREAT SERPL-MCNC: 1.1 MG/DL — SIGNIFICANT CHANGE UP (ref 0.5–1.3)
EOSINOPHIL # BLD AUTO: 0.1 K/UL — SIGNIFICANT CHANGE UP (ref 0–0.5)
EOSINOPHIL NFR BLD AUTO: 1 % — SIGNIFICANT CHANGE UP (ref 0–6)
GLUCOSE SERPL-MCNC: 112 MG/DL — HIGH (ref 70–99)
HCT VFR BLD CALC: 29.5 % — LOW (ref 39–50)
HGB BLD-MCNC: 10 G/DL — LOW (ref 13–17)
LYMPHOCYTES # BLD AUTO: 1.3 K/UL — SIGNIFICANT CHANGE UP (ref 1–3.3)
LYMPHOCYTES # BLD AUTO: 21.9 % — SIGNIFICANT CHANGE UP (ref 13–44)
MCHC RBC-ENTMCNC: 30.8 PG — SIGNIFICANT CHANGE UP (ref 27–34)
MCHC RBC-ENTMCNC: 33.8 G/DL — SIGNIFICANT CHANGE UP (ref 32–36)
MCV RBC AUTO: 91.1 FL — SIGNIFICANT CHANGE UP (ref 80–100)
MONOCYTES # BLD AUTO: 0.5 K/UL — SIGNIFICANT CHANGE UP (ref 0–0.9)
MONOCYTES NFR BLD AUTO: 8.8 % — SIGNIFICANT CHANGE UP (ref 2–14)
NEUTROPHILS # BLD AUTO: 3.8 K/UL — SIGNIFICANT CHANGE UP (ref 1.8–7.4)
NEUTROPHILS NFR BLD AUTO: 65.6 % — SIGNIFICANT CHANGE UP (ref 43–77)
PLATELET # BLD AUTO: 492 K/UL — HIGH (ref 150–400)
POTASSIUM SERPL-MCNC: 5.1 MMOL/L — SIGNIFICANT CHANGE UP (ref 3.5–5.3)
POTASSIUM SERPL-SCNC: 5.1 MMOL/L — SIGNIFICANT CHANGE UP (ref 3.5–5.3)
RBC # BLD: 3.24 M/UL — LOW (ref 4.2–5.8)
RBC # FLD: 14.6 % — HIGH (ref 10.3–14.5)
SODIUM SERPL-SCNC: 133 MMOL/L — LOW (ref 135–145)
WBC # BLD: 5.8 K/UL — SIGNIFICANT CHANGE UP (ref 3.8–10.5)
WBC # FLD AUTO: 5.8 K/UL — SIGNIFICANT CHANGE UP (ref 3.8–10.5)

## 2020-01-14 PROCEDURE — 77014: CPT | Mod: 26

## 2020-01-15 ENCOUNTER — RESULT REVIEW (OUTPATIENT)
Age: 80
End: 2020-01-15

## 2020-01-15 ENCOUNTER — APPOINTMENT (OUTPATIENT)
Age: 80
End: 2020-01-15

## 2020-01-15 DIAGNOSIS — Z51.11 ENCOUNTER FOR ANTINEOPLASTIC CHEMOTHERAPY: ICD-10-CM

## 2020-01-15 DIAGNOSIS — R11.2 NAUSEA WITH VOMITING, UNSPECIFIED: ICD-10-CM

## 2020-01-15 LAB
BUN SERPL-MCNC: 29 MG/DL — HIGH (ref 7–23)
CA-I BLDA-SCNC: 1.22 MMOL/L — SIGNIFICANT CHANGE UP (ref 1.12–1.3)
CHLORIDE SERPL-SCNC: 98 MMOL/L — SIGNIFICANT CHANGE UP (ref 96–108)
CO2 SERPL-SCNC: 28 MMOL/L — SIGNIFICANT CHANGE UP (ref 22–31)
CREAT SERPL-MCNC: 1.1 MG/DL — SIGNIFICANT CHANGE UP (ref 0.5–1.3)
GLUCOSE SERPL-MCNC: 91 MG/DL — SIGNIFICANT CHANGE UP (ref 70–99)
POTASSIUM SERPL-MCNC: 5.1 MMOL/L — SIGNIFICANT CHANGE UP (ref 3.5–5.3)
POTASSIUM SERPL-SCNC: 5.1 MMOL/L — SIGNIFICANT CHANGE UP (ref 3.5–5.3)
SODIUM SERPL-SCNC: 132 MMOL/L — LOW (ref 135–145)

## 2020-01-15 PROCEDURE — 77387B: CUSTOM | Mod: 26

## 2020-01-16 ENCOUNTER — APPOINTMENT (OUTPATIENT)
Age: 80
End: 2020-01-16

## 2020-01-16 PROCEDURE — 77387B: CUSTOM | Mod: 26

## 2020-01-28 ENCOUNTER — OUTPATIENT (OUTPATIENT)
Dept: OUTPATIENT SERVICES | Facility: HOSPITAL | Age: 80
LOS: 1 days | Discharge: HOME | End: 2020-01-28

## 2020-01-28 DIAGNOSIS — Z90.49 ACQUIRED ABSENCE OF OTHER SPECIFIED PARTS OF DIGESTIVE TRACT: Chronic | ICD-10-CM

## 2020-01-28 DIAGNOSIS — Z98.49 CATARACT EXTRACTION STATUS, UNSPECIFIED EYE: Chronic | ICD-10-CM

## 2020-01-28 DIAGNOSIS — C09.9 MALIGNANT NEOPLASM OF TONSIL, UNSPECIFIED: ICD-10-CM

## 2020-01-31 ENCOUNTER — RESULT REVIEW (OUTPATIENT)
Age: 80
End: 2020-01-31

## 2020-01-31 ENCOUNTER — APPOINTMENT (OUTPATIENT)
Dept: HEMATOLOGY ONCOLOGY | Facility: CLINIC | Age: 80
End: 2020-01-31
Payer: MEDICARE

## 2020-01-31 VITALS
SYSTOLIC BLOOD PRESSURE: 112 MMHG | HEIGHT: 73 IN | TEMPERATURE: 98.2 F | BODY MASS INDEX: 23.2 KG/M2 | HEART RATE: 81 BPM | DIASTOLIC BLOOD PRESSURE: 79 MMHG | OXYGEN SATURATION: 95 % | WEIGHT: 175.04 LBS

## 2020-01-31 DIAGNOSIS — J18.9 PNEUMONIA, UNSPECIFIED ORGANISM: ICD-10-CM

## 2020-01-31 LAB
BASOPHILS # BLD AUTO: 0 K/UL — SIGNIFICANT CHANGE UP (ref 0–0.2)
BASOPHILS NFR BLD AUTO: 1.3 % — SIGNIFICANT CHANGE UP (ref 0–2)
EOSINOPHIL # BLD AUTO: 0.1 K/UL — SIGNIFICANT CHANGE UP (ref 0–0.5)
EOSINOPHIL NFR BLD AUTO: 2.3 % — SIGNIFICANT CHANGE UP (ref 0–6)
HCT VFR BLD CALC: 28.3 % — LOW (ref 39–50)
HGB BLD-MCNC: 9.2 G/DL — LOW (ref 13–17)
LYMPHOCYTES # BLD AUTO: 0.7 K/UL — LOW (ref 1–3.3)
LYMPHOCYTES # BLD AUTO: 24.4 % — SIGNIFICANT CHANGE UP (ref 13–44)
MCHC RBC-ENTMCNC: 31.4 PG — SIGNIFICANT CHANGE UP (ref 27–34)
MCHC RBC-ENTMCNC: 32.6 G/DL — SIGNIFICANT CHANGE UP (ref 32–36)
MCV RBC AUTO: 96.2 FL — SIGNIFICANT CHANGE UP (ref 80–100)
MONOCYTES # BLD AUTO: 0.3 K/UL — SIGNIFICANT CHANGE UP (ref 0–0.9)
MONOCYTES NFR BLD AUTO: 9.3 % — SIGNIFICANT CHANGE UP (ref 2–14)
NEUTROPHILS # BLD AUTO: 1.8 K/UL — SIGNIFICANT CHANGE UP (ref 1.8–7.4)
NEUTROPHILS NFR BLD AUTO: 62.8 % — SIGNIFICANT CHANGE UP (ref 43–77)
PLATELET # BLD AUTO: 189 K/UL — SIGNIFICANT CHANGE UP (ref 150–400)
RBC # BLD: 2.95 M/UL — LOW (ref 4.2–5.8)
RBC # FLD: 18.3 % — HIGH (ref 10.3–14.5)
WBC # BLD: 2.9 K/UL — LOW (ref 3.8–10.5)
WBC # FLD AUTO: 2.9 K/UL — LOW (ref 3.8–10.5)

## 2020-01-31 PROCEDURE — 99215 OFFICE O/P EST HI 40 MIN: CPT

## 2020-01-31 NOTE — CONSULT LETTER
[Dear  ___] : Dear  [unfilled], [Consult Closing:] : Thank you very much for allowing me to participate in the care of this patient.  If you have any questions, please do not hesitate to contact me. [Please see my note below.] : Please see my note below. [Consult Letter:] : I had the pleasure of evaluating your patient, [unfilled]. [DrKailyn  ___] : Dr. ROCHA [Sincerely,] : Sincerely, [FreeTextEntry3] : Dr. Selena Vidal

## 2020-01-31 NOTE — HISTORY OF PRESENT ILLNESS
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  He reported having dysphagia and odynophagia since early August of this year.  He initially thought that this was related to recent dental work, and was treated with a course of antibiotics. However, his oral surgeon Dr. Nieves, noted a tonsillar lesion and referred him to ENT, Dr. Lira. CT scan of the neck 10/2/19 at [facility] showed a mass arising from the left tonsil with probable metastatic disease to the left neck. He was referred to Dr. Seymour who performed a biopsy on 10/16/19, pathology c/w SCC.  He had a staging PET on 10/22/19 which showed an FDG avid large soft tissue mass in the left palatine tonsillar region, SUV 52.5. Mass extends from the soft palate, nasopharynx and lateral oropharyngeal wall, to the level of the aryepiglottic fold, as noted on diagnostic CT.  Activity crosses the midline at the level of the nasopharynx and uvula. Multiple FDG avid left level II cervical nodes were present. [de-identified] : He was smoking 2 cigars a day for the past 40 years, and quit with this diagnosis. He stopped drinking alcohol in 2007. Last colonoscopy in the 1970s.  [de-identified] : Patient presents s/p 6 cycles of CRT with weekly Cisplatin for SCC of the left tonsil.  + Odynophagia, still 4/10.  + Hoarseness.  + Decreased appetite but weight stable.  PEG placed 11/21, nutrition currently 100% via PEG. Starting to eat PO again today. + Loose stools.  + Xerostomia. + Mucositis.  Erythematous skin around neck much improved.  No excessive secretions.  Pain at PEG site resolved.  No fevers, no chills.  No D/C.  Nausea resolved. + Mouth sore. + Wearing home O2 at night. Sleeping well. No pain.

## 2020-01-31 NOTE — PHYSICAL EXAM
[Restricted in physically strenuous activity but ambulatory and able to carry out work of a light or sedentary nature] : Status 1- Restricted in physically strenuous activity but ambulatory and able to carry out work of a light or sedentary nature, e.g., light house work, office work [Normal] : affect appropriate [de-identified] : Large left bulbus polypoid tonsillar mass, much improved today. Limited oral exam due to gag reflex and question right tonsillar enlargement? [de-identified] : No palpable cervical lymph nodes.

## 2020-02-03 ENCOUNTER — APPOINTMENT (OUTPATIENT)
Dept: OTOLARYNGOLOGY | Facility: CLINIC | Age: 80
End: 2020-02-03
Payer: MEDICARE

## 2020-02-03 VITALS
HEART RATE: 81 BPM | DIASTOLIC BLOOD PRESSURE: 81 MMHG | SYSTOLIC BLOOD PRESSURE: 133 MMHG | BODY MASS INDEX: 23.19 KG/M2 | WEIGHT: 175 LBS | HEIGHT: 73 IN

## 2020-02-03 LAB — MAGNESIUM SERPL-MCNC: 1.9 MG/DL

## 2020-02-03 PROCEDURE — 99214 OFFICE O/P EST MOD 30 MIN: CPT | Mod: 25

## 2020-02-03 PROCEDURE — 31575 DIAGNOSTIC LARYNGOSCOPY: CPT

## 2020-02-03 RX ORDER — OXYCODONE 5 MG/1
5 TABLET ORAL
Qty: 60 | Refills: 0 | Status: DISCONTINUED | COMMUNITY
Start: 2019-11-06 | End: 2020-02-03

## 2020-02-03 RX ORDER — ONDANSETRON 8 MG/1
8 TABLET, ORALLY DISINTEGRATING ORAL EVERY 8 HOURS
Qty: 90 | Refills: 1 | Status: DISCONTINUED | COMMUNITY
Start: 2019-12-06 | End: 2020-02-03

## 2020-02-03 RX ORDER — OXYCODONE 5 MG/1
5 TABLET ORAL
Qty: 90 | Refills: 0 | Status: DISCONTINUED | COMMUNITY
Start: 2019-10-30 | End: 2020-02-03

## 2020-02-03 RX ORDER — SILVER SULFADIAZINE 10 MG/G
1 CREAM TOPICAL
Qty: 1 | Refills: 1 | Status: DISCONTINUED | COMMUNITY
Start: 2019-12-23 | End: 2020-02-03

## 2020-02-03 NOTE — PHYSICAL EXAM
[de-identified] : Posttreatment changes, no LAD. [Laryngoscopy Performed] : laryngoscopy was performed, see procedure section for findings [FreeTextEntry1] : Posttreatment changes along the L. OPx.  No concerning masses or lesions along the right OPx.  No new lesions in the OC. [Normal] : no rashes

## 2020-02-03 NOTE — CONSULT LETTER
[Dear  ___] : Dear  [unfilled], [Please see my note below.] : Please see my note below. [Courtesy Letter:] : I had the pleasure of seeing your patient, [unfilled], in my office today. [Sincerely,] : Sincerely, [Consult Closing:] : Thank you very much for allowing me to participate in the care of this patient.  If you have any questions, please do not hesitate to contact me. [FreeTextEntry2] : Dr. Jamel Lira\par [address], [address]\[place] [address]  [FreeTextEntry3] : Dev

## 2020-02-03 NOTE — PROCEDURE
[None] : none [Flexible Endoscope] : examined with the flexible endoscope [Serial Number: ___] : Serial Number: [unfilled] [de-identified] : No lesions in the NPx, OPx, HPx or larynx.  Expected posttreatment changes, VC are mobile, airway patent.\par  [de-identified] : Tonsil SCCa

## 2020-02-03 NOTE — HISTORY OF PRESENT ILLNESS
[de-identified] : Pt is s/p tonsil biopsy on 10/16/19 which resulted as SCCa.  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  Pt is on completed chemo with Dr. Vidal 1/14/2020 and radiation pm 1/16/2020 with Dr. Brown .  Pt using PEG tube for water intake only.  pt is able to tolerate soft diet. Pt admits to have mild pain at mouth and some nausea. wt loss - was 194lbs down to 175lb ( stable around 175lbs)  . pt is in rehab center after a fall  since 1/3/2020. pt has MBS afew days ago  at [facility]- pending result .  last imaing in 10/21/2019.

## 2020-02-04 ENCOUNTER — APPOINTMENT (OUTPATIENT)
Dept: CT IMAGING | Facility: CLINIC | Age: 80
End: 2020-02-04

## 2020-02-05 LAB
ALBUMIN SERPL ELPH-MCNC: 3.9 G/DL
ALP BLD-CCNC: 101 U/L
ALT SERPL-CCNC: 17 U/L
ANION GAP SERPL CALC-SCNC: 12 MMOL/L
AST SERPL-CCNC: 21 U/L
BILIRUB SERPL-MCNC: 0.3 MG/DL
BUN SERPL-MCNC: 35 MG/DL
CALCIUM SERPL-MCNC: 9.2 MG/DL
CHLORIDE SERPL-SCNC: 97 MMOL/L
CO2 SERPL-SCNC: 26 MMOL/L
CREAT SERPL-MCNC: 1.45 MG/DL
GLUCOSE SERPL-MCNC: 123 MG/DL
POTASSIUM SERPL-SCNC: 5 MMOL/L
PROT SERPL-MCNC: 6.2 G/DL
SODIUM SERPL-SCNC: 135 MMOL/L

## 2020-03-02 ENCOUNTER — OUTPATIENT (OUTPATIENT)
Dept: OUTPATIENT SERVICES | Facility: HOSPITAL | Age: 80
LOS: 1 days | Discharge: HOME | End: 2020-03-02

## 2020-03-02 DIAGNOSIS — Z90.49 ACQUIRED ABSENCE OF OTHER SPECIFIED PARTS OF DIGESTIVE TRACT: Chronic | ICD-10-CM

## 2020-03-02 DIAGNOSIS — C09.9 MALIGNANT NEOPLASM OF TONSIL, UNSPECIFIED: ICD-10-CM

## 2020-03-02 DIAGNOSIS — Z98.49 CATARACT EXTRACTION STATUS, UNSPECIFIED EYE: Chronic | ICD-10-CM

## 2020-03-03 ENCOUNTER — APPOINTMENT (OUTPATIENT)
Dept: RADIATION ONCOLOGY | Facility: CLINIC | Age: 80
End: 2020-03-03
Payer: MEDICARE

## 2020-03-03 VITALS
WEIGHT: 171 LBS | OXYGEN SATURATION: 100 % | RESPIRATION RATE: 16 BRPM | SYSTOLIC BLOOD PRESSURE: 154 MMHG | DIASTOLIC BLOOD PRESSURE: 70 MMHG | HEART RATE: 67 BPM | TEMPERATURE: 98.5 F | BODY MASS INDEX: 22.66 KG/M2 | HEIGHT: 73 IN

## 2020-03-03 PROCEDURE — 99024 POSTOP FOLLOW-UP VISIT: CPT

## 2020-03-04 NOTE — HISTORY OF PRESENT ILLNESS
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 Weekly CRT with 6 cycle Cisplatin completed under Dr Vidal and  7000 cGy radiation therapy completed to the left tonsil and neck on 1/16/2020\par  \par CT Neck ordered for Feb 2020\par \par PEG use

## 2020-03-04 NOTE — PHYSICAL EXAM
[Normal] : oriented to person, place and time, the affect was normal, the mood was normal and not anxious [de-identified] : somewhat dry oral cavity; mirror exam unremarkable.

## 2020-03-04 NOTE — LETTER GREETING
[Dear Doctor] : Dear Doctor, [Follow-Up] : Your patient, [unfilled] was seen in my office today for follow-up [Please see my note below.] : Please see my note below. [FreeTextEntry2] : Wilfred Garcia MD

## 2020-03-04 NOTE — ASSESSMENT
[No evidence of disease] : No evidence of disease [FreeTextEntry1] : moderate resolving expected  treatment related sequelae

## 2020-03-04 NOTE — LETTER CLOSING
[Sincerely yours,] : Sincerely yours, [FreeTextEntry3] : Nimesh Brown MD\par Physician in Chief\par Department of Radiation Medicine\par [facility] [place]\par [facility] [place]\par \par  of Radiation Medicine\par [facility]\par at  \[facility]\""/[facility]\par \par Radiation \par [facility]/\par [facility] [place]\par [address]\par [place], [place] [address]\par \par Tel: [phone]\par Fax: [phone]\par

## 2020-03-06 ENCOUNTER — RESULT REVIEW (OUTPATIENT)
Age: 80
End: 2020-03-06

## 2020-03-06 ENCOUNTER — APPOINTMENT (OUTPATIENT)
Dept: HEMATOLOGY ONCOLOGY | Facility: CLINIC | Age: 80
End: 2020-03-06
Payer: MEDICARE

## 2020-03-06 VITALS
SYSTOLIC BLOOD PRESSURE: 148 MMHG | TEMPERATURE: 97.5 F | WEIGHT: 171 LBS | HEART RATE: 70 BPM | OXYGEN SATURATION: 98 % | BODY MASS INDEX: 22.66 KG/M2 | HEIGHT: 73 IN | DIASTOLIC BLOOD PRESSURE: 82 MMHG

## 2020-03-06 LAB
BASOPHILS # BLD AUTO: 0.1 K/UL — SIGNIFICANT CHANGE UP (ref 0–0.2)
BASOPHILS NFR BLD AUTO: 1.5 % — SIGNIFICANT CHANGE UP (ref 0–2)
EOSINOPHIL # BLD AUTO: 0.2 K/UL — SIGNIFICANT CHANGE UP (ref 0–0.5)
EOSINOPHIL NFR BLD AUTO: 3.1 % — SIGNIFICANT CHANGE UP (ref 0–6)
HCT VFR BLD CALC: 34.9 % — LOW (ref 39–50)
HGB BLD-MCNC: 11.5 G/DL — LOW (ref 13–17)
LYMPHOCYTES # BLD AUTO: 1 K/UL — SIGNIFICANT CHANGE UP (ref 1–3.3)
LYMPHOCYTES # BLD AUTO: 19.9 % — SIGNIFICANT CHANGE UP (ref 13–44)
MCHC RBC-ENTMCNC: 32.9 G/DL — SIGNIFICANT CHANGE UP (ref 32–36)
MCHC RBC-ENTMCNC: 33.6 PG — SIGNIFICANT CHANGE UP (ref 27–34)
MCV RBC AUTO: 102 FL — HIGH (ref 80–100)
MONOCYTES # BLD AUTO: 0.6 K/UL — SIGNIFICANT CHANGE UP (ref 0–0.9)
MONOCYTES NFR BLD AUTO: 11.5 % — SIGNIFICANT CHANGE UP (ref 2–14)
NEUTROPHILS # BLD AUTO: 3.4 K/UL — SIGNIFICANT CHANGE UP (ref 1.8–7.4)
NEUTROPHILS NFR BLD AUTO: 64 % — SIGNIFICANT CHANGE UP (ref 43–77)
PLATELET # BLD AUTO: 273 K/UL — SIGNIFICANT CHANGE UP (ref 150–400)
RBC # BLD: 3.42 M/UL — LOW (ref 4.2–5.8)
RBC # FLD: 13.7 % — SIGNIFICANT CHANGE UP (ref 10.3–14.5)
WBC # BLD: 5.2 K/UL — SIGNIFICANT CHANGE UP (ref 3.8–10.5)
WBC # FLD AUTO: 5.2 K/UL — SIGNIFICANT CHANGE UP (ref 3.8–10.5)

## 2020-03-06 PROCEDURE — 99214 OFFICE O/P EST MOD 30 MIN: CPT

## 2020-03-09 ENCOUNTER — APPOINTMENT (OUTPATIENT)
Dept: OTOLARYNGOLOGY | Facility: CLINIC | Age: 80
End: 2020-03-09
Payer: MEDICARE

## 2020-03-09 VITALS — WEIGHT: 170 LBS | BODY MASS INDEX: 22.43 KG/M2

## 2020-03-09 LAB
ALBUMIN SERPL ELPH-MCNC: 4.5 G/DL
ALP BLD-CCNC: 86 U/L
ALT SERPL-CCNC: 16 U/L
ANION GAP SERPL CALC-SCNC: 13 MMOL/L
AST SERPL-CCNC: 17 U/L
BILIRUB SERPL-MCNC: 0.2 MG/DL
BUN SERPL-MCNC: 31 MG/DL
CALCIUM SERPL-MCNC: 9.7 MG/DL
CHLORIDE SERPL-SCNC: 104 MMOL/L
CO2 SERPL-SCNC: 23 MMOL/L
CREAT SERPL-MCNC: 1.45 MG/DL
GLUCOSE SERPL-MCNC: 90 MG/DL
MAGNESIUM SERPL-MCNC: 1.9 MG/DL
POTASSIUM SERPL-SCNC: 5.6 MMOL/L
PROT SERPL-MCNC: 6.8 G/DL
SODIUM SERPL-SCNC: 140 MMOL/L
T3 SERPL-MCNC: 96 NG/DL
T4 SERPL-MCNC: 6.3 UG/DL
TSH SERPL-ACNC: 2.51 UIU/ML

## 2020-03-09 PROCEDURE — 99214 OFFICE O/P EST MOD 30 MIN: CPT | Mod: 25

## 2020-03-09 PROCEDURE — 31575 DIAGNOSTIC LARYNGOSCOPY: CPT

## 2020-03-09 RX ORDER — PROCHLORPERAZINE MALEATE 10 MG/1
10 TABLET ORAL EVERY 6 HOURS
Qty: 120 | Refills: 3 | Status: COMPLETED | COMMUNITY
Start: 2019-12-06 | End: 2020-03-09

## 2020-03-09 RX ORDER — MAGNESIUM OXIDE 241.3 MG/1000MG
400 TABLET ORAL
Qty: 42 | Refills: 0 | Status: COMPLETED | COMMUNITY
Start: 2019-12-23 | End: 2020-03-09

## 2020-03-09 NOTE — PROCEDURE
[Gag Reflex] : gag reflex preventing mirror examination [None] : none [Flexible Endoscope] : examined with the flexible endoscope [Serial Number: ___] : Serial Number: [unfilled] [de-identified] : No lesions in the NPx, OPx, HPx or larynx. Improving posttreatment changes with less edema. VC are mobile, airway patent.

## 2020-03-09 NOTE — PHYSICAL EXAM
[de-identified] : Posttreatment changes, no LAD. [Laryngoscopy Performed] : laryngoscopy was performed, see procedure section for findings [FreeTextEntry1] : Posttreatment changes along the L. OPx.  No concerning masses or lesions along the right OPx.  No new lesions in the OC. [Normal] : no rashes

## 2020-03-09 NOTE — HISTORY OF PRESENT ILLNESS
[de-identified] : Pt is s/p tonsil biopsy on 10/16/19 which resulted as SCCa.  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  Pt is on completed chemo with Dr. Vidal 1/14/2020 and radiation pm 1/16/2020 with Dr. Brown .  Pt has  PEG tube not using any more for the past 3 weeks. pt is  tolerate soft diet and regular diet. Pt admit pain at mouth almost resolved and nausea resolved  wt loss - was 194lbs down to 170lb ( stable around 170lbs). pt is discharge from rehab center 1/26/2020.  His weight is stable.  He denies any dyspnea, otalgia.

## 2020-03-09 NOTE — CONSULT LETTER
[Dear  ___] : Dear  [unfilled], [Courtesy Letter:] : I had the pleasure of seeing your patient, [unfilled], in my office today. [Please see my note below.] : Please see my note below. [Consult Closing:] : Thank you very much for allowing me to participate in the care of this patient.  If you have any questions, please do not hesitate to contact me. [Sincerely,] : Sincerely, [FreeTextEntry2] : Dr. Jamel Lira\par [address], [address]\[place] [address]  [FreeTextEntry3] : Dev

## 2020-03-19 NOTE — DISCHARGE NOTE NURSING/CASE MANAGEMENT/SOCIAL WORK - NSDPACMPNY_GEN_ALL_CORE
Traveling alone Rituxan Pregnancy And Lactation Text: This medication is Pregnancy Category C and it isn't know if it is safe during pregnancy. It is unknown if this medication is excreted in breast milk but similar antibodies are known to be excreted.

## 2020-03-31 NOTE — RESULTS/DATA
[FreeTextEntry1] : 12/30/19 CT Chest: Multifocal pneumonia\par \par 10/22/19 PET:\par FDG avid large soft tissue mass in the left palatine tonsillar region, SUV 52.5. Mass extends from the soft palate, nasopharynx and lateral oropharyngeal wall, to the level of the aryepiglottic fold, as noted on diagnostic CT. Activity crosses the midline at the level of the nasopharynx and uvula. Multiple FDG avid left level II cervical nodes. Most prominent nodes: -IIB node, 1.0 x 0.7 cm, SUV 28.3.  -IIA node, 1.2 x 1.1 cm, SUV 19.6. Several mildly FDG avid small left level III/V cervical nodes. Level III node, 0.6 x 0.4 cm, SUV 2.8.  Asymmetrical FDG avid foci in the region of the right glossopharyngeal sulcus, SUV 7.6 and right base of tongue, SUV 10.2.  Several mildly FDG avid small right level II and III nodes. Level IIA node, 1.1 x 0.6 cm, SUV 4.5.  FDG-avid focus in the proximal sigmoid colon, SUV 18.7.  Enlarged prostate, 5 cm in maximum transverse dimension. No abnormal FDG activity.\par \par 10/16/19 Pathology:\par Tonsil, left, biopsy - Squamous cell carcinoma, non-keratinizing.  \par p16 immunostain on block 1FSA is negative.  HPV16/18/31 MARLENY performed on block 1FSA by [facility] is weakly positive in tumor cell nuclei.\par \par 10/2/19 CT Neck:\par mass arising in the left palatine tonsil with extension to the nasopharynx, soft palate and lateral oropharyngeal wall with probable metastatic luis felipe disease in left level I.

## 2020-03-31 NOTE — PHYSICAL EXAM
[Restricted in physically strenuous activity but ambulatory and able to carry out work of a light or sedentary nature] : Status 1- Restricted in physically strenuous activity but ambulatory and able to carry out work of a light or sedentary nature, e.g., light house work, office work [Normal] : pharynx is unremarkable, moist mucus membrane, no oral lesions [de-identified] : No palpable cervical lymph nodes.

## 2020-03-31 NOTE — HISTORY OF PRESENT ILLNESS
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  He reported having dysphagia and odynophagia since early August of this year.  He initially thought that this was related to recent dental work, and was treated with a course of antibiotics. However, his oral surgeon Dr. Nieves, noted a tonsillar lesion and referred him to ENT, Dr. Lira. CT scan of the neck 10/2/19 at [facility] showed a mass arising from the left tonsil with probable metastatic disease to the left neck. He was referred to Dr. Seymour who performed a biopsy on 10/16/19, pathology c/w SCC.  He had a staging PET on 10/22/19 which showed an FDG avid large soft tissue mass in the left palatine tonsillar region, SUV 52.5. Mass extends from the soft palate, nasopharynx and lateral oropharyngeal wall, to the level of the aryepiglottic fold, as noted on diagnostic CT.  Activity crosses the midline at the level of the nasopharynx and uvula. Multiple FDG avid left level II cervical nodes were present. [de-identified] : He was smoking 2 cigars a day for the past 40 years, and quit with this diagnosis. He stopped drinking alcohol in 2007. Last colonoscopy in the 1970s.  [de-identified] : Patient presents s/p 6 cycles of CRT with weekly Cisplatin for SCC of the left tonsil, completed 1/16/20.  + Odynophagia, still 4/10.  + Hoarseness.  + Decreased appetite but weight stable.  PEG placed 11/21, nutrition currently 100% via PEG. Starting to eat PO again today. + Loose stools.  + Xerostomia. + Mucositis.  Erythematous skin around neck much improved.  No excessive secretions.  Pain at PEG site resolved.  No fevers, no chills.  No D/C.  Nausea resolved. + Mouth sore. + Wearing home O2 at night. Sleeping well. No pain.

## 2020-04-17 ENCOUNTER — OUTPATIENT (OUTPATIENT)
Dept: OUTPATIENT SERVICES | Facility: HOSPITAL | Age: 80
LOS: 1 days | Discharge: HOME | End: 2020-04-17

## 2020-04-17 DIAGNOSIS — Z98.49 CATARACT EXTRACTION STATUS, UNSPECIFIED EYE: Chronic | ICD-10-CM

## 2020-04-17 DIAGNOSIS — C09.9 MALIGNANT NEOPLASM OF TONSIL, UNSPECIFIED: ICD-10-CM

## 2020-04-17 DIAGNOSIS — Z90.49 ACQUIRED ABSENCE OF OTHER SPECIFIED PARTS OF DIGESTIVE TRACT: Chronic | ICD-10-CM

## 2020-04-21 ENCOUNTER — OUTPATIENT (OUTPATIENT)
Dept: OUTPATIENT SERVICES | Facility: HOSPITAL | Age: 80
LOS: 1 days | End: 2020-04-21

## 2020-04-21 ENCOUNTER — APPOINTMENT (OUTPATIENT)
Dept: NUCLEAR MEDICINE | Facility: CLINIC | Age: 80
End: 2020-04-21
Payer: MEDICARE

## 2020-04-21 ENCOUNTER — APPOINTMENT (OUTPATIENT)
Dept: CT IMAGING | Facility: CLINIC | Age: 80
End: 2020-04-21
Payer: MEDICARE

## 2020-04-21 DIAGNOSIS — C09.9 MALIGNANT NEOPLASM OF TONSIL, UNSPECIFIED: ICD-10-CM

## 2020-04-21 DIAGNOSIS — Z98.49 CATARACT EXTRACTION STATUS, UNSPECIFIED EYE: Chronic | ICD-10-CM

## 2020-04-21 DIAGNOSIS — Z90.49 ACQUIRED ABSENCE OF OTHER SPECIFIED PARTS OF DIGESTIVE TRACT: Chronic | ICD-10-CM

## 2020-04-21 PROCEDURE — 70491 CT SOFT TISSUE NECK W/DYE: CPT | Mod: 26

## 2020-04-21 PROCEDURE — 78815 PET IMAGE W/CT SKULL-THIGH: CPT | Mod: 26,PS

## 2020-04-23 ENCOUNTER — APPOINTMENT (OUTPATIENT)
Dept: RADIATION ONCOLOGY | Facility: CLINIC | Age: 80
End: 2020-04-23
Payer: MEDICARE

## 2020-04-23 PROCEDURE — 99213 OFFICE O/P EST LOW 20 MIN: CPT | Mod: 95

## 2020-04-23 NOTE — REVIEW OF SYSTEMS
[Negative] : Allergic/Immunologic [Mucositis Oral: Grade 0] : Mucositis Oral: Grade 0  [Oral Pain: Grade 0] : Oral Pain: Grade 0 [Xerostomia: Grade 0] : Xerostomia: Grade 0 [Dysgeusia: Grade 0] : Dysgeusia: Grade 0

## 2020-04-24 ENCOUNTER — APPOINTMENT (OUTPATIENT)
Dept: HEMATOLOGY ONCOLOGY | Facility: CLINIC | Age: 80
End: 2020-04-24
Payer: MEDICARE

## 2020-04-24 DIAGNOSIS — R52 PAIN, UNSPECIFIED: ICD-10-CM

## 2020-04-24 DIAGNOSIS — R11.0 NAUSEA: ICD-10-CM

## 2020-04-24 DIAGNOSIS — T45.1X5A NAUSEA: ICD-10-CM

## 2020-04-24 DIAGNOSIS — E83.42 HYPOMAGNESEMIA: ICD-10-CM

## 2020-04-24 PROCEDURE — 99443: CPT | Mod: CR

## 2020-04-24 NOTE — HISTORY OF PRESENT ILLNESS
[Home] : at home, [unfilled] , at the time of the visit. 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 Weekly CRT with 6 cycle Cisplatin completed under Dr Vidal and  7000 cGy radiation therapy completed to the left tonsil and neck on 1/16/2020\par  \par CT Neck4/21/20 IMPRESSION:  \par \par Compared to the pretreatment PET CT from 10/21/2019, there has been a marked interval decrease in size of the large infiltrative trans-spacial mass involving the left oropharynx/nasopharynx as described. Nonspecific soft tissue with minimal enhancement is noted at the margins of the treated lesion which may reflect posttreatment changes; serial imaging follow-up over time is needed to exclude any residual or recurrent tumor component. \par \par No enlarged or necrotic cervical lymph nodes are visualized on the current exam. \par \par New and increasing nonspecific groundglass nodular opacities are present at both lung apices. Although this could reflect developing apical radiation fibrosis, serial clinical and imaging follow-up is needed to exclude other etiologies such as pneumonia or aspiration.\par \par \par PET/CT 4/21/2020 IMPRESSION:  \par \par Since PET/CT October 21, 2019:\par \par 1.  Near complete resolution of left oropharyngeal/nasopharyngeal mass. Minimal residual oropharyngeal FDG avidity with linear morphology may represent posttreatment changes. Suggest correlation with direct inspection and continued follow-up to exclude recurrent disease.\par 2.  Resolution of bilateral cervical lymphadenopathy.\par 3.  Resolution of bilateral pulmonary infiltrates since CT chest December 30, 2019. Minimally FDG avid biapical pleural parenchymal opacities may represent post radiation treatment changes.\par \par \par 4.  Decreased FDG avidity of sigmoid colonic focus, possibly polyp.\par \par \par PEG  not in use,flushed daily. tolerating soft diet and regular diet. Denies dysphagia, odynophagia, dysgeusia and xerostomia greatly improved. [FreeTextEntry4] : Melissa Bae, NP

## 2020-04-24 NOTE — RESULTS/DATA
[FreeTextEntry1] : 4/21/20  PET:  Compared to the pretreatment PET CT from 10/21/2019, there has been a marked interval decrease in size of the large infiltrative trans-spacial mass involving the left oropharynx/nasopharynx as described. Nonspecific soft tissue with minimal enhancement is noted at the margins of the treated lesion which may reflect posttreatment changes; serial imaging follow-up over time is needed to exclude any residual or recurrent tumor component.  No enlarged or necrotic cervical lymph nodes are visualized on the current exam.  New and increasing nonspecific groundglass nodular opacities are present at both lung apices. Although this could reflect developing apical radiation fibrosis, serial clinical and imaging follow-up is needed to exclude other etiologies such as pneumonia or aspiration.\par

## 2020-04-24 NOTE — LETTER CLOSING
[Sincerely yours,] : Sincerely yours, [FreeTextEntry3] : Nimesh Brown MD\par Physician in Chief\par Department of Radiation Medicine\par [facility] [place]\par [facility] [place]\par \par  of Radiation Medicine\par [facility]\par at  [facility]\par \par Radiation \par [facility]/\par [facility] [place]\par [address]\par [place], [place] [address]\par \par Tel: [phone]\par Fax: [phone]\par

## 2020-04-27 ENCOUNTER — APPOINTMENT (OUTPATIENT)
Dept: OTOLARYNGOLOGY | Facility: CLINIC | Age: 80
End: 2020-04-27
Payer: MEDICARE

## 2020-04-27 VITALS
HEIGHT: 73 IN | SYSTOLIC BLOOD PRESSURE: 151 MMHG | HEART RATE: 66 BPM | BODY MASS INDEX: 22.8 KG/M2 | WEIGHT: 172 LBS | DIASTOLIC BLOOD PRESSURE: 81 MMHG

## 2020-04-27 VITALS — TEMPERATURE: 97.6 F

## 2020-04-27 PROCEDURE — 99214 OFFICE O/P EST MOD 30 MIN: CPT | Mod: 25

## 2020-04-27 PROCEDURE — 31575 DIAGNOSTIC LARYNGOSCOPY: CPT

## 2020-04-27 NOTE — HISTORY OF PRESENT ILLNESS
[de-identified] : Pt is s/p tonsil biopsy on 10/16/19 which resulted as SCCa.  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  Pt is on completed chemo with Dr. Vidal 1/14/2020 and radiation pm 1/16/2020 with Dr. Brown .  Pt has  PEG tube not using since 2/15/2020.  pt is tolerate regular diet.  pt is discharge from rehab center 1/26/2020.  His weight is stable.  He denies any dyspnea, otalgia or dysphagia.\par last imaging on 4/21/2020.

## 2020-04-27 NOTE — PHYSICAL EXAM
[Laryngoscopy Performed] : laryngoscopy was performed, see procedure section for findings [Normal] : no rashes [de-identified] : Posttreatment changes, no LAD. [FreeTextEntry1] : Posttreatment changes along the L. OPx.  No concerning masses or lesions along the right OPx.  No new lesions in the OC.

## 2020-04-27 NOTE — CONSULT LETTER
[Dear  ___] : Dear  [unfilled], [Please see my note below.] : Please see my note below. [Courtesy Letter:] : I had the pleasure of seeing your patient, [unfilled], in my office today. [Consult Closing:] : Thank you very much for allowing me to participate in the care of this patient.  If you have any questions, please do not hesitate to contact me. [FreeTextEntry2] : Dr. Jamel Lira\par [address]\[place] [address]  [FreeTextEntry3] : Dev [Sincerely,] : Sincerely,

## 2020-04-27 NOTE — PROCEDURE
[Gag Reflex] : gag reflex preventing mirror examination [None] : none [Flexible Endoscope] : examined with the flexible endoscope [Serial Number: ___] : Serial Number: [unfilled] [de-identified] : No lesions in the NPx, OPx, HPx or larynx.  Stable posttreatment changes, VC are mobile, airway patent.\par

## 2020-05-01 ENCOUNTER — APPOINTMENT (OUTPATIENT)
Dept: GASTROENTEROLOGY | Facility: CLINIC | Age: 80
End: 2020-05-01
Payer: MEDICARE

## 2020-05-01 VITALS
WEIGHT: 173 LBS | BODY MASS INDEX: 22.93 KG/M2 | DIASTOLIC BLOOD PRESSURE: 78 MMHG | HEIGHT: 73 IN | SYSTOLIC BLOOD PRESSURE: 130 MMHG | HEART RATE: 78 BPM

## 2020-05-01 DIAGNOSIS — Z43.1 ENCOUNTER FOR ATTENTION TO GASTROSTOMY: ICD-10-CM

## 2020-05-01 PROCEDURE — 99214 OFFICE O/P EST MOD 30 MIN: CPT

## 2020-05-01 NOTE — PHYSICAL EXAM
[General Appearance - Alert] : alert [General Appearance - In No Acute Distress] : in no acute distress [General Appearance - Well Nourished] : well nourished [General Appearance - Well Developed] : well developed [General Appearance - Well-Appearing] : healthy appearing [PERRL With Normal Accommodation] : pupils were equal in size, round, and reactive to light [Sclera] : the sclera and conjunctiva were normal [Extraocular Movements] : extraocular movements were intact [Outer Ear] : the ears and nose were normal in appearance [Oropharynx] : the oropharynx was normal [Examination Of The Oral Cavity] : the lips and gums were normal [Neck Cervical Mass (___cm)] : no neck mass was observed [Neck Appearance] : the appearance of the neck was normal [Jugular Venous Distention Increased] : there was no jugular-venous distention [Thyroid Diffuse Enlargement] : the thyroid was not enlarged [Thyroid Nodule] : there were no palpable thyroid nodules [Auscultation Breath Sounds / Voice Sounds] : lungs were clear to auscultation bilaterally [Heart Rate And Rhythm] : heart rate was normal and rhythm regular [Heart Sounds] : normal S1 and S2 [Heart Sounds Gallop] : no gallops [Murmurs] : no murmurs [Heart Sounds Pericardial Friction Rub] : no pericardial rub [Abdomen Soft] : soft [Bowel Sounds] : normal bowel sounds [Abdomen Tenderness] : non-tender [] : no hepato-splenomegaly [Abdomen Mass (___ Cm)] : no abdominal mass palpated [No CVA Tenderness] : no ~M costovertebral angle tenderness [Abnormal Walk] : normal gait [Musculoskeletal - Swelling] : no joint swelling seen [Skin Color & Pigmentation] : normal skin color and pigmentation [Oriented To Time, Place, And Person] : oriented to person, place, and time [Skin Turgor] : normal skin turgor [FreeTextEntry1] : not done not indicated

## 2020-05-01 NOTE — HISTORY OF PRESENT ILLNESS
[None] : had no significant interval events [Heartburn] : denies heartburn [Nausea] : denies nausea [Vomiting] : denies vomiting [Diarrhea] : denies diarrhea [Constipation] : denies constipation [Yellow Skin Or Eyes (Jaundice)] : denies jaundice [Abdominal Pain] : denies abdominal pain [Abdominal Swelling] : denies abdominal swelling [Rectal Pain] : denies rectal pain [Cholelithiasis] : cholelithiasis [Malignancy] : malignancy [Cholecystectomy] : cholecystectomy [Wt Gain ___ Lbs] : no recent weight gain [Wt Loss ___ Lbs] : no recent weight loss [GERD] : no gastroesophageal reflux disease [Hiatus Hernia] : no hiatus hernia [Peptic Ulcer Disease] : no peptic ulcer disease [Pancreatitis] : no pancreatitis [Kidney Stone] : no kidney stone [Inflammatory Bowel Disease] : no inflammatory bowel disease [Irritable Bowel Syndrome] : no irritable bowel syndrome [Alcohol Abuse] : no alcohol abuse [Diverticulitis] : no diverticulitis [Appendectomy] : no appendectomy [Abdominal Surgery] : no abdominal surgery [de-identified] : Patient presents today for PEG tube removal status post PEG tube placement in November of 2019 prior to the initiation of radiation therapy for head and neck cancer which he is done with. He has not had to use his PEG tube for feedings for over 2 months and has had neck cancer is currently in remission. He is here today to have his PEG tube manually removed. He does not take any anticoagulants or antiplatelet agents and did not eat breakfast prior to coming in for this visit.He has been eating and drinking orally for over 2 months without any issues or problems and has not had to use the PEG tube for anything during this time. [de-identified] : November 21, 2019 [de-identified] : PEG tube placement

## 2020-05-01 NOTE — ASSESSMENT
[FreeTextEntry1] : Impression: Status post PEG tube insertion with manual removal today with no adverse effects. He tolerated the removal well and a sterile 4 x 4 gauze pad was placed and taped over the gastrostomy site.\par \par Recommendations: Patient was told that the gastrostomy site should close within a week or 2 and to apply sterile dressing to the area once daily. He was told to clean the area with soap and water once daily and to avoid ibuprofen today. He was told that he could eat after this visit was advised to return here on an as needed basis. He appeared to understand all of the above instructions, information, and management plan.

## 2020-07-16 ENCOUNTER — APPOINTMENT (OUTPATIENT)
Dept: CT IMAGING | Facility: CLINIC | Age: 80
End: 2020-07-16
Payer: MEDICARE

## 2020-07-16 ENCOUNTER — OUTPATIENT (OUTPATIENT)
Dept: OUTPATIENT SERVICES | Facility: HOSPITAL | Age: 80
LOS: 1 days | End: 2020-07-16

## 2020-07-16 DIAGNOSIS — C09.9 MALIGNANT NEOPLASM OF TONSIL, UNSPECIFIED: ICD-10-CM

## 2020-07-16 DIAGNOSIS — Z98.49 CATARACT EXTRACTION STATUS, UNSPECIFIED EYE: Chronic | ICD-10-CM

## 2020-07-16 DIAGNOSIS — Z90.49 ACQUIRED ABSENCE OF OTHER SPECIFIED PARTS OF DIGESTIVE TRACT: Chronic | ICD-10-CM

## 2020-07-16 PROCEDURE — 70491 CT SOFT TISSUE NECK W/DYE: CPT | Mod: 26

## 2020-07-16 PROCEDURE — 71260 CT THORAX DX C+: CPT | Mod: 26

## 2020-07-21 ENCOUNTER — APPOINTMENT (OUTPATIENT)
Dept: RADIATION ONCOLOGY | Facility: CLINIC | Age: 80
End: 2020-07-21
Payer: MEDICARE

## 2020-07-21 PROCEDURE — 99201 OFFICE OUTPATIENT NEW 10 MINUTES: CPT | Mod: 95

## 2020-07-21 PROCEDURE — 99212 OFFICE O/P EST SF 10 MIN: CPT | Mod: 95

## 2020-07-21 NOTE — HISTORY OF PRESENT ILLNESS
[Medical Office: ([facility])___] : at the medical office located in  [Home] : at home, [unfilled] , at the time of the visit. 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  He is s/p ChemoRadiation. Weekly CRT -- 6 cycles Cisplatin completed under Dr Vidal, and 7000 cGy radiation therapy completed to the left tonsil and neck on 1/16/2020\par  \par CT Neck 7/16/20 Findings:  \par No significant change in nonspecific soft tissue left nasopharynx/oropharynx, suggesting post-treatment change.\par No enlarged or necrotic cervical lymph nodes. \par CT Chest 7/16/2020 also unremarkable.\par \par \par PET/CT 4/21/2020 IMPRESSION:  \par Since PET/CT October 21, 2019:\par 1.  Near complete resolution of left oropharyngeal/nasopharyngeal mass. Minimal residual oropharyngeal FDG avidity with linear morphology may represent posttreatment changes. Suggest correlation with direct inspection and continued follow-up to exclude recurrent disease.\par 2.  Resolution of bilateral cervical lymphadenopathy.\par 3.  Resolution of bilateral pulmonary infiltrates since CT chest December 30, 2019. Minimally FDG avid biapical pleural parenchymal opacities may represent post radiation treatment changes.\par 4.  Decreased FDG avidity of sigmoid colonic focus, possibly polyp.\par \par At last visit, he reported PEG not in use, was flushed daily, and tolerating soft regular diet.  Denied dysphagia, odynophagia; dysgeusia and xerostomia were greatly improved.

## 2020-07-21 NOTE — VITALS
[Maximal Pain Intensity: 0/10] : 0/10 [80: Normal activity with effort; some signs or symptoms of disease.] : 80: Normal activity with effort; some signs or symptoms of disease.  [Least Pain Intensity: 0/10] : 0/10 [ECOG Performance Status: 1 - Restricted in physically strenuous activity but ambulatory and able to carry out work of a light or sedentary nature] : Performance Status: 1 - Restricted in physically strenuous activity but ambulatory and able to carry out work of a light or sedentary nature, e.g., light house work, office work

## 2020-07-21 NOTE — REASON FOR VISIT
[Head and Neck Cancer] : head and neck cancer [Family Member] : family member [Routine Follow-Up] : routine follow-up visit for

## 2020-07-21 NOTE — LETTER CLOSING
[Sincerely yours,] : Sincerely yours, [FreeTextEntry3] : Nimesh Brown MD\par Physician in Chief\par Department of Radiation Medicine\par [facility] [place]\par [facility] [place]\par \par  of Radiation Medicine\par [facility]\par at  [facility]\par \par Radiation \par [facility]/\par [facility] at [place]\par [address]\par [place], [place] [address]\par \par Tel: [phone]\par Fax: [phone]\par

## 2020-07-21 NOTE — REVIEW OF SYSTEMS
[Mucositis Oral: Grade 0] : Mucositis Oral: Grade 0  [Negative] : Allergic/Immunologic [Oral Pain: Grade 0] : Oral Pain: Grade 0 [Xerostomia: Grade 0] : Xerostomia: Grade 0 [Dysgeusia: Grade 0] : Dysgeusia: Grade 0 [FreeTextEntry2] : maintaining weight [FreeTextEntry4] : altered taste and oral dryness

## 2020-07-21 NOTE — LETTER GREETING
[Follow-Up] : Your patient, [unfilled] was seen in my office today for follow-up [Dear Doctor] : Dear Doctor, [Please see my note below.] : Please see my note below. [FreeTextEntry2] : Wilfred Garcia MD

## 2020-07-27 ENCOUNTER — APPOINTMENT (OUTPATIENT)
Dept: OTOLARYNGOLOGY | Facility: CLINIC | Age: 80
End: 2020-07-27
Payer: MEDICARE

## 2020-07-27 VITALS
TEMPERATURE: 98.3 F | SYSTOLIC BLOOD PRESSURE: 124 MMHG | HEART RATE: 75 BPM | WEIGHT: 172 LBS | DIASTOLIC BLOOD PRESSURE: 74 MMHG | BODY MASS INDEX: 22.69 KG/M2

## 2020-07-27 PROCEDURE — 31575 DIAGNOSTIC LARYNGOSCOPY: CPT

## 2020-07-27 PROCEDURE — 99214 OFFICE O/P EST MOD 30 MIN: CPT | Mod: 25

## 2020-07-27 NOTE — PROCEDURE
[Gag Reflex] : gag reflex preventing mirror examination [None] : none [Flexible Endoscope] : examined with the flexible endoscope [Serial Number: ___] : Serial Number: [unfilled] [de-identified] : No lesions in the NPx, OPx, HPx or larynx.  Stable posttreatment changes, VC are mobile, airway patent.\par

## 2020-07-27 NOTE — CONSULT LETTER
[Courtesy Letter:] : I had the pleasure of seeing your patient, [unfilled], in my office today. [Dear  ___] : Dear  [unfilled], [Please see my note below.] : Please see my note below. [Sincerely,] : Sincerely, [Consult Closing:] : Thank you very much for allowing me to participate in the care of this patient.  If you have any questions, please do not hesitate to contact me. [FreeTextEntry2] : Dr. Jamel Lira\par [address]\[place] [address] [FreeTextEntry3] : Dev

## 2020-07-27 NOTE — HISTORY OF PRESENT ILLNESS
[de-identified] : Pt is s/p tonsil biopsy on 10/16/19 which resulted as SCCa. 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 Pt completed chemo with Dr. Vidal 1/14/2020 and radiation on 1/16/2020 with Dr. Brown .  pt is tolerate regular diet. Pt is maintaining weight.  Pt completed SLP services at [facility].  Pt is doing his own message for the lymphedema.  The lymphedema is improving.  Pt sometimes has fluids come up through his nose but this is also improving.  He denies any dyspnea, otalgia or weight loss.  He denies any hemoptysis. \par

## 2020-07-27 NOTE — PHYSICAL EXAM
[Laryngoscopy Performed] : laryngoscopy was performed, see procedure section for findings [Normal] : orientation to person, place, and time: normal [de-identified] : Posttreatment changes, no LAD. [FreeTextEntry1] : Posttreatment changes along the L. OPx.  No concerning masses or lesions along the right OPx.  No new lesions in the OC.

## 2020-10-20 ENCOUNTER — APPOINTMENT (OUTPATIENT)
Dept: RADIATION ONCOLOGY | Facility: CLINIC | Age: 80
End: 2020-10-20
Payer: MEDICARE

## 2020-10-20 VITALS
BODY MASS INDEX: 23.33 KG/M2 | HEIGHT: 73 IN | DIASTOLIC BLOOD PRESSURE: 76 MMHG | OXYGEN SATURATION: 98 % | WEIGHT: 176 LBS | HEART RATE: 67 BPM | SYSTOLIC BLOOD PRESSURE: 124 MMHG | TEMPERATURE: 98 F

## 2020-10-20 PROCEDURE — 99213 OFFICE O/P EST LOW 20 MIN: CPT

## 2020-10-21 NOTE — LETTER CLOSING
[Sincerely yours,] : Sincerely yours, [FreeTextEntry3] : Nimesh Brown MD\par Physician in Chief\par Department of Radiation Medicine\par [facility] [place]\par [facility] [place]\par \par  of Radiation Medicine\par [facility]\par at  [facility]\par \par Radiation \par [facility]par [facility] Imaging at [place]\par [address]\par [place], [place] [address]\par \par Tel: [phone]\par Fax: [phone]\par

## 2020-10-21 NOTE — HISTORY OF PRESENT ILLNESS
[Medical Office: ([facility])___] : at the medical office located in  [Home] : at home, [unfilled] , at the time of the visit. 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  He is s/p ChemoRadiation. Weekly CRT -- 6 cycles Cisplatin completed under Dr Vidal, and 7000 cGy radiation therapy completed to the left tonsil and neck on 1/16/2020.\par  \par CT Neck 7/16/20:  \par No significant change in nonspecific soft tissue left nasopharynx/oropharynx, suggesting post-treatment change.\par No enlarged or necrotic cervical lymph nodes. \par \par CT Chest 7/16/2020: unremarkable.\par \par \par PET/CT 4/21/2020 IMPRESSION:  \par Since PET/CT October 21, 2019:\par 1.  Near complete resolution of left oropharyngeal/nasopharyngeal mass. Minimal residual oropharyngeal FDG avidity with linear morphology may represent posttreatment changes. Suggest correlation with direct inspection and continued follow-up to exclude recurrent disease.\par 2.  Resolution of bilateral cervical lymphadenopathy.\par 3.  Resolution of bilateral pulmonary infiltrates since CT chest December 30, 2019. Minimally FDG avid biapical pleural parenchymal opacities may represent post radiation treatment changes.\par 4.  Decreased FDG avidity of sigmoid colonic focus, possibly polyp.\par \par Mr. Warren reports tolerating a regular diet with good appetite.  Denies odynophagia; dysgeusia, and xerostomia.  He reports some difficulty with food and liquid coming out his left nostril when eating.  Referred to speech and swallow therapist for further evaluation.

## 2020-10-21 NOTE — REVIEW OF SYSTEMS
[Negative] : Heme/Lymph [Mucositis Oral: Grade 0] : Mucositis Oral: Grade 0  [Dysgeusia: Grade 0] : Dysgeusia: Grade 0 [Oral Pain: Grade 0] : Oral Pain: Grade 0 [Xerostomia: Grade 0] : Xerostomia: Grade 0 [FreeTextEntry2] : maintaining weight [FreeTextEntry4] : altered taste and oral dryness , modest and tolerable

## 2020-10-21 NOTE — DATA REVIEWED
[No studies available for review at this time.] : No studies available for review at this time. Detail Level: Simple Quality 111:Pneumonia Vaccination Status For Older Adults: Pneumococcal Vaccination Previously Received Quality 130: Documentation Of Current Medications In The Medical Record: Current Medications Documented

## 2020-10-21 NOTE — PHYSICAL EXAM
[Normal] : no JVD, no calf tenderness [de-identified] : asymetrical posterior oropharynx, right side roldan that left .

## 2020-10-26 ENCOUNTER — APPOINTMENT (OUTPATIENT)
Dept: OTOLARYNGOLOGY | Facility: CLINIC | Age: 80
End: 2020-10-26
Payer: MEDICARE

## 2020-10-26 VITALS
BODY MASS INDEX: 22.69 KG/M2 | SYSTOLIC BLOOD PRESSURE: 135 MMHG | HEART RATE: 77 BPM | WEIGHT: 172 LBS | DIASTOLIC BLOOD PRESSURE: 84 MMHG

## 2020-10-26 PROCEDURE — 31575 DIAGNOSTIC LARYNGOSCOPY: CPT

## 2020-10-26 PROCEDURE — 99214 OFFICE O/P EST MOD 30 MIN: CPT | Mod: 25

## 2020-10-26 NOTE — HISTORY OF PRESENT ILLNESS
[de-identified] : Pt is s/p tonsil  SCCa. 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 Pt completed chemo with Dr. Vidal 1/14/2020 and radiation on 1/16/2020 with Dr. Brown.  last imaging in 7/2020. pt is tolerate regular diet. Pt is maintaining weight.  pt is being refer back to SLP for  fluids come up through his nose and swallowing issue.  He denies any dyspnea, otalgia or weight loss.  He denies any hemoptysis. \par

## 2020-10-26 NOTE — CONSULT LETTER
[Dear  ___] : Dear  [unfilled], [Courtesy Letter:] : I had the pleasure of seeing your patient, [unfilled], in my office today. [Please see my note below.] : Please see my note below. [Consult Closing:] : Thank you very much for allowing me to participate in the care of this patient.  If you have any questions, please do not hesitate to contact me. [Sincerely,] : Sincerely, [FreeTextEntry2] : Dr. Jamel Lira\par [address], [address]\[place] [address]  [FreeTextEntry3] : Dev

## 2020-10-26 NOTE — PROCEDURE
[Gag Reflex] : gag reflex preventing mirror examination [None] : none [Flexible Endoscope] : examined with the flexible endoscope [Serial Number: ___] : Serial Number: [unfilled] [de-identified] : No lesions in the NPx, OPx, HPx or larynx.  Stable posttreatment changes, VC are mobile, airway patent.\par

## 2020-10-26 NOTE — PHYSICAL EXAM
[Laryngoscopy Performed] : laryngoscopy was performed, see procedure section for findings [Normal] : no rashes [de-identified] : Posttreatment changes, no LAD. [FreeTextEntry1] : Posttreatment changes along the L. OPx.  No concerning masses or lesions along the right OPx.  No new lesions in the OC.

## 2021-01-04 NOTE — ASU PATIENT PROFILE, ADULT - CAREGIVER PHONE NUMBER
5 faxed pages of forms was received from [facility] requesting review of any new orders or changes from 12/3020 appointment. The forms are being worked on in Nurse Triage.Thais Machado LPN    
Completed, signed forms were returned to this writer and faxed to Estella Daily at [phone]. The original copies were sent to scanning.Thais Machado LPN    
[phone]

## 2021-01-07 ENCOUNTER — OUTPATIENT (OUTPATIENT)
Dept: OUTPATIENT SERVICES | Facility: HOSPITAL | Age: 81
LOS: 1 days | End: 2021-01-07

## 2021-01-07 ENCOUNTER — APPOINTMENT (OUTPATIENT)
Dept: CT IMAGING | Facility: CLINIC | Age: 81
End: 2021-01-07
Payer: MEDICARE

## 2021-01-07 DIAGNOSIS — Z98.49 CATARACT EXTRACTION STATUS, UNSPECIFIED EYE: Chronic | ICD-10-CM

## 2021-01-07 DIAGNOSIS — Z90.49 ACQUIRED ABSENCE OF OTHER SPECIFIED PARTS OF DIGESTIVE TRACT: Chronic | ICD-10-CM

## 2021-01-07 DIAGNOSIS — C09.9 MALIGNANT NEOPLASM OF TONSIL, UNSPECIFIED: ICD-10-CM

## 2021-01-07 PROCEDURE — 70491 CT SOFT TISSUE NECK W/DYE: CPT | Mod: 26

## 2021-01-07 PROCEDURE — 71260 CT THORAX DX C+: CPT | Mod: 26

## 2021-01-12 ENCOUNTER — NON-APPOINTMENT (OUTPATIENT)
Age: 81
End: 2021-01-12

## 2021-02-08 ENCOUNTER — APPOINTMENT (OUTPATIENT)
Dept: OTOLARYNGOLOGY | Facility: CLINIC | Age: 81
End: 2021-02-08
Payer: MEDICARE

## 2021-02-08 VITALS
DIASTOLIC BLOOD PRESSURE: 76 MMHG | HEART RATE: 66 BPM | SYSTOLIC BLOOD PRESSURE: 145 MMHG | TEMPERATURE: 97.3 F | OXYGEN SATURATION: 99 %

## 2021-02-08 PROCEDURE — 99214 OFFICE O/P EST MOD 30 MIN: CPT | Mod: 25

## 2021-02-08 PROCEDURE — 31575 DIAGNOSTIC LARYNGOSCOPY: CPT

## 2021-02-08 NOTE — HISTORY OF PRESENT ILLNESS
[de-identified] : Pt is s/p tonsil  SCCa. 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 Pt completed chemo with Dr. Vidal 1/14/2020 and radiation on 1/16/2020 with Dr. Brown.  last imaging in 1/7/2021.  Pt is working with Tracy, SLP at [facility] to prevent regurgitation and improve swallowing.  Otherwise, pt is feeling very well.  Pt has a new area of soreness on his R tongue for the last week.

## 2021-02-08 NOTE — PROCEDURE
[Gag Reflex] : gag reflex preventing mirror examination [None] : none [Flexible Endoscope] : examined with the flexible endoscope [Serial Number: ___] : Serial Number: [unfilled] [de-identified] : No lesions in the NPx, OPx, HPx or larynx.  Stable posttreatment changes, VC are mobile, airway patent.\par

## 2021-02-08 NOTE — PHYSICAL EXAM
[Normal] : no rashes [Laryngoscopy Performed] : laryngoscopy was performed, see procedure section for findings [de-identified] : Posttreatment changes, no LAD. [FreeTextEntry1] : Posttreatment changes along the L. OPx.  No concerning masses or lesions along the right OPx.  There is a small area of traumatic ulcer along the right lateral tongue which is healing - likely secondary to biting of the tongue? No new concerning lesions in the OC.

## 2021-02-08 NOTE — CONSULT LETTER
[Dear  ___] : Dear  [unfilled], [Courtesy Letter:] : I had the pleasure of seeing your patient, [unfilled], in my office today. [Please see my note below.] : Please see my note below. [Consult Closing:] : Thank you very much for allowing me to participate in the care of this patient.  If you have any questions, please do not hesitate to contact me. [Sincerely,] : Sincerely, [FreeTextEntry2] : Dr. Jamel Lira\par [address]\[place] [address]  [FreeTextEntry3] : Dev

## 2021-02-23 ENCOUNTER — APPOINTMENT (OUTPATIENT)
Dept: RADIATION ONCOLOGY | Facility: CLINIC | Age: 81
End: 2021-02-23
Payer: MEDICARE

## 2021-02-23 VITALS
HEART RATE: 67 BPM | WEIGHT: 178.2 LBS | SYSTOLIC BLOOD PRESSURE: 143 MMHG | OXYGEN SATURATION: 98 % | BODY MASS INDEX: 23.51 KG/M2 | DIASTOLIC BLOOD PRESSURE: 91 MMHG | RESPIRATION RATE: 16 BRPM

## 2021-02-23 PROCEDURE — 99212 OFFICE O/P EST SF 10 MIN: CPT

## 2021-02-24 NOTE — HISTORY OF PRESENT ILLNESS
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  He is s/p ChemoRadiation. Weekly CRT -- 6 cycles Cisplatin completed under Dr Vidal, and 7000 cGy radiation therapy completed to the left tonsil and neck on 1/16/2020.\par \par CT Neck  1/7/2021:\par IMPRESSION:\par No significant interval change since prior CT neck of 7/16/2020. No evidence of recurrence or metastatic disease.\par \par CT Neck 7/16/20:  \par No significant change in nonspecific soft tissue left nasopharynx/oropharynx, suggesting post-treatment change.\par No enlarged or necrotic cervical lymph nodes. \par \par CT Chest 7/16/2020: unremarkable.\par \par \par PET/CT 4/21/2020 IMPRESSION:  \par Since PET/CT October 21, 2019:\par 1.  Near complete resolution of left oropharyngeal/nasopharyngeal mass. Minimal residual oropharyngeal FDG avidity with linear morphology may represent posttreatment changes. Suggest correlation with direct inspection and continued follow-up to exclude recurrent disease.\par 2.  Resolution of bilateral cervical lymphadenopathy.\par 3.  Resolution of bilateral pulmonary infiltrates since CT chest December 30, 2019. Minimally FDG avid biapical pleural parenchymal opacities may represent post radiation treatment changes.\par 4.  Decreased FDG avidity of sigmoid colonic focus, possibly polyp.\par \par Mr. Warren reports tolerating a regular diet with good appetite.  Denies odynophagia; dysgeusia, and xerostomia.  He reports some difficulty with food and liquid coming out his left nostril when eating.  Referred to speech and swallow therapist for further evaluation.

## 2021-02-24 NOTE — REVIEW OF SYSTEMS
[Negative] : Allergic/Immunologic [Mucositis Oral: Grade 0] : Mucositis Oral: Grade 0  [Xerostomia: Grade 0] : Xerostomia: Grade 0 [Oral Pain: Grade 0] : Oral Pain: Grade 0 [Dysgeusia: Grade 0] : Dysgeusia: Grade 0 [FreeTextEntry2] : maintaining weight [FreeTextEntry4] : altered taste and oral dryness continue, modest and tolerable

## 2021-02-24 NOTE — PHYSICAL EXAM
[Normal] : supple with no thyromegaly or masses appreciated [de-identified] : scar tissue over left  tonsillar area ; mirror exam only remarkable for some thickening of the epiglottis.

## 2021-02-24 NOTE — LETTER CLOSING
[Sincerely yours,] : Sincerely yours, [FreeTextEntry3] : Nimesh Brown MD\par Physician in Chief\par Department of Radiation Medicine\par [facility] [place]\par [facility] [place]\par \par  of Radiation Medicine\par [facility]\par at  [facility]\par \par Radiation \par [facility]par [facility] Imaging at [place]\par [address]\par [place], [place] [address]\par \par Tel: [phone]\par Fax: [phone]\par

## 2021-02-24 NOTE — DISEASE MANAGEMENT
[Clinical] : TNM Stage: c [IV] : IV [FreeTextEntry4] : squamous cell carcinoma [TTNM] : 4b [NTNM] : 2b [MTNM] : 0 [de-identified] : 7000cGy [de-identified] : posterior oropharynx and necks

## 2021-05-14 ENCOUNTER — APPOINTMENT (OUTPATIENT)
Dept: GASTROENTEROLOGY | Facility: CLINIC | Age: 81
End: 2021-05-14

## 2021-05-24 ENCOUNTER — APPOINTMENT (OUTPATIENT)
Dept: OTOLARYNGOLOGY | Facility: CLINIC | Age: 81
End: 2021-05-24
Payer: MEDICARE

## 2021-05-24 VITALS
DIASTOLIC BLOOD PRESSURE: 85 MMHG | OXYGEN SATURATION: 98 % | WEIGHT: 184 LBS | HEART RATE: 65 BPM | SYSTOLIC BLOOD PRESSURE: 166 MMHG | BODY MASS INDEX: 24.92 KG/M2 | TEMPERATURE: 96.5 F | HEIGHT: 72 IN

## 2021-05-24 PROCEDURE — 99214 OFFICE O/P EST MOD 30 MIN: CPT | Mod: 25

## 2021-05-24 PROCEDURE — 31575 DIAGNOSTIC LARYNGOSCOPY: CPT

## 2021-05-24 NOTE — PHYSICAL EXAM
[Normal] : no rashes [Laryngoscopy Performed] : laryngoscopy was performed, see procedure section for findings [de-identified] : Posttreatment changes, no LAD. [FreeTextEntry1] : Posttreatment changes along the L. OPx.  No concerning masses or lesions along the right OPx.  There is a small area of traumatic ulcer along the right lateral tongue which is healing - likely secondary to biting of the tongue? No new concerning lesions in the OC.

## 2021-05-24 NOTE — HISTORY OF PRESENT ILLNESS
[de-identified] : Pt is s/p tonsil  SCCa. 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 Pt completed chemo with Dr. Vidal 1/14/2020 and radiation on 1/16/2020 with Dr. Brown.  last imaging in 1/7/2021. Pt denies dysphonia, dysphagia, pain, SOB, otalgia.  Pt is no longer in speech and swallow therapy.  He is no longer regurgitating.  PT feels well overall.  He denies any weight loss.

## 2021-05-24 NOTE — CONSULT LETTER
[Dear  ___] : Dear  [unfilled], [Courtesy Letter:] : I had the pleasure of seeing your patient, [unfilled], in my office today. [Please see my note below.] : Please see my note below. [Consult Closing:] : Thank you very much for allowing me to participate in the care of this patient.  If you have any questions, please do not hesitate to contact me. [Sincerely,] : Sincerely, [FreeTextEntry2] : Dr. Jamel Lira\par [address], [address]\[place] [address]  [FreeTextEntry3] : Dev

## 2021-05-24 NOTE — PROCEDURE
[Gag Reflex] : gag reflex preventing mirror examination [None] : none [Flexible Endoscope] : examined with the flexible endoscope [Serial Number: ___] : Serial Number: [unfilled] [de-identified] : No lesions in the NPx, OPx, HPx or larynx.  Stable posttreatment changes, VC are mobile, airway patent.\par  [de-identified] : Tonsil SCCa

## 2021-07-08 ENCOUNTER — APPOINTMENT (OUTPATIENT)
Dept: CT IMAGING | Facility: CLINIC | Age: 81
End: 2021-07-08
Payer: MEDICARE

## 2021-07-08 ENCOUNTER — OUTPATIENT (OUTPATIENT)
Dept: OUTPATIENT SERVICES | Facility: HOSPITAL | Age: 81
LOS: 1 days | End: 2021-07-08

## 2021-07-08 DIAGNOSIS — C09.9 MALIGNANT NEOPLASM OF TONSIL, UNSPECIFIED: ICD-10-CM

## 2021-07-08 DIAGNOSIS — Z90.49 ACQUIRED ABSENCE OF OTHER SPECIFIED PARTS OF DIGESTIVE TRACT: Chronic | ICD-10-CM

## 2021-07-08 DIAGNOSIS — Z98.49 CATARACT EXTRACTION STATUS, UNSPECIFIED EYE: Chronic | ICD-10-CM

## 2021-07-08 PROCEDURE — 71260 CT THORAX DX C+: CPT | Mod: 26

## 2021-07-08 PROCEDURE — 70491 CT SOFT TISSUE NECK W/DYE: CPT | Mod: 26

## 2021-08-24 ENCOUNTER — APPOINTMENT (OUTPATIENT)
Dept: RADIATION ONCOLOGY | Facility: CLINIC | Age: 81
End: 2021-08-24
Payer: MEDICARE

## 2021-08-24 VITALS
WEIGHT: 182 LBS | DIASTOLIC BLOOD PRESSURE: 77 MMHG | HEART RATE: 72 BPM | TEMPERATURE: 98.1 F | SYSTOLIC BLOOD PRESSURE: 146 MMHG | HEIGHT: 72 IN | BODY MASS INDEX: 24.65 KG/M2 | RESPIRATION RATE: 16 BRPM | OXYGEN SATURATION: 98 %

## 2021-08-24 PROCEDURE — 99212 OFFICE O/P EST SF 10 MIN: CPT

## 2021-08-25 NOTE — LETTER CLOSING
[Sincerely yours,] : Sincerely yours, [FreeTextEntry3] : Nimesh Brown MD\par Physician in Chief\par Department of Radiation Medicine\par [facility] [place]\par [facility] [place]\par \par  of Radiation Medicine\par [facility]\par at  [facility]\par \par Radiation \par [facility]/\par [facility] Imaging at [place]\par [address]\par [place], [place] [address]\par \par Tel: [phone]\par Fax: [phone]\par

## 2021-08-25 NOTE — HISTORY OF PRESENT ILLNESS
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  He is s/p ChemoRadiation. Weekly CRT -- 6 cycles Cisplatin completed under Dr Vidal, and 7000 cGy radiation therapy completed to the left tonsil and neck on 1/16/2020.\par \par CT Neck 7/8/2021:  \par IMPRESSION:\par Primary: NIRADS 1 - Expected post-treatment changes without evidence of recurrent disease in the primary site.\par \par aside from somewhat dry mouth and altered taste sensation . No complaints.\par \par

## 2021-08-25 NOTE — ASSESSMENT
[No evidence of disease] : No evidence of disease [FreeTextEntry1] : moderate stable  expected  treatment related sequelae

## 2021-08-25 NOTE — REVIEW OF SYSTEMS
[Negative] : Allergic/Immunologic [Mucositis Oral: Grade 0] : Mucositis Oral: Grade 0  [Xerostomia: Grade 0] : Xerostomia: Grade 0 [Oral Pain: Grade 0] : Oral Pain: Grade 0 [Dysgeusia: Grade 0] : Dysgeusia: Grade 0 [FreeTextEntry2] : maintaining weight [FreeTextEntry4] : oral dryness continue, modest and tolerable

## 2021-08-25 NOTE — DISEASE MANAGEMENT
[Clinical] : TNM Stage: c [IV] : IV [FreeTextEntry4] : squamous cell carcinoma [TTNM] : 4b [NTNM] : 2b [MTNM] : 0 [de-identified] : 7000cGy [de-identified] : posterior oropharynx and necks

## 2021-08-25 NOTE — PHYSICAL EXAM
[Normal] : oriented to person, place and time, the affect was normal, the mood was normal and not anxious [de-identified] : somewhat dry oral cavity.

## 2021-09-13 ENCOUNTER — APPOINTMENT (OUTPATIENT)
Dept: OTOLARYNGOLOGY | Facility: CLINIC | Age: 81
End: 2021-09-13
Payer: MEDICARE

## 2021-09-13 VITALS — TEMPERATURE: 97.8 F | WEIGHT: 182 LBS | HEIGHT: 74 IN | BODY MASS INDEX: 23.36 KG/M2

## 2021-09-13 PROCEDURE — 99214 OFFICE O/P EST MOD 30 MIN: CPT | Mod: 25

## 2021-09-13 PROCEDURE — 31575 DIAGNOSTIC LARYNGOSCOPY: CPT

## 2021-09-13 NOTE — PROCEDURE
[Gag Reflex] : gag reflex preventing mirror examination [None] : none [Flexible Endoscope] : examined with the flexible endoscope [Serial Number: ___] : Serial Number: [unfilled] [de-identified] : No lesions in the NPx, OPx, HPx or larynx.  Stable posttreatment changes, VC are mobile, airway patent.\par  [de-identified] : Tonsil SCCa

## 2021-09-13 NOTE — PHYSICAL EXAM
[Laryngoscopy Performed] : laryngoscopy was performed, see procedure section for findings [Normal] : no rashes [de-identified] : Posttreatment changes, no LAD. [FreeTextEntry1] : Posttreatment changes along the L. OPx.  No concerning masses or lesions along the OC or OPx.

## 2021-09-13 NOTE — HISTORY OF PRESENT ILLNESS
[de-identified] : Pt is s/p tonsil  SCCa. 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 Pt completed chemo with Dr. Vidal 1/14/2020 and radiation on 1/16/2020 with Dr. Brown.  last imaging in 7/8/2021 [facility].  Pt is here to f/u and still  dry mouth.  pt states the assistant his GI ( DR. Veronica) review the scan and did not need to do f/u and will call pt if need to be seen.  Pt  denies dysphonia, dysphagia, pain, SOB, otalgia.  He denies any weight loss.

## 2021-09-15 NOTE — ASU DISCHARGE PLAN (ADULT/PEDIATRIC) - POST OP PHONE #
Patient seen in anticoagulation clinic. Reviewed past inr and dose with patient. Patient denies any missed dose of Warfarin. Patient denies any changes in meds or health. Reports Vit K intake has been consistent. INR critical high and instructed to hold Warfarin 9/15 and 9/16 then pill strength adjusted and 2.5mg strength sent. Dosing decreased and recheck inr in 1 week. Patient reminded to call office with any changes in meds or health. Warfarin dosed per protocol.  On site provider is Dr JU Vaughan.   [phone]

## 2021-12-08 ENCOUNTER — APPOINTMENT (OUTPATIENT)
Dept: RADIATION ONCOLOGY | Facility: CLINIC | Age: 81
End: 2021-12-08
Payer: MEDICARE

## 2021-12-08 VITALS
WEIGHT: 180.6 LBS | DIASTOLIC BLOOD PRESSURE: 86 MMHG | SYSTOLIC BLOOD PRESSURE: 151 MMHG | BODY MASS INDEX: 23.19 KG/M2 | RESPIRATION RATE: 16 BRPM | HEART RATE: 86 BPM | OXYGEN SATURATION: 99 % | TEMPERATURE: 97.7 F

## 2021-12-08 DIAGNOSIS — Z93.1 GASTROSTOMY STATUS: ICD-10-CM

## 2021-12-08 PROCEDURE — 99212 OFFICE O/P EST SF 10 MIN: CPT

## 2021-12-08 NOTE — LETTER CLOSING
[Sincerely yours,] : Sincerely yours, [FreeTextEntry3] : Nimesh Brown MD\par Physician in Chief\par Department of Radiation Medicine\par [facility] [place]\par [facility] [place]\par \par  of Radiation Medicine\par [facility]\par at  [facility]\par \par Radiation \par [facility]/\par [facility] [place]\par [address]\par [place], [place] [address]\par \par Tel: [phone]\par Fax: [phone]\par

## 2021-12-08 NOTE — DISEASE MANAGEMENT
[Clinical] : TNM Stage: c [IV] : IV [FreeTextEntry4] : squamous cell carcinoma [TTNM] : 4b [NTNM] : 2b [MTNM] : 0 [de-identified] : 7000cGy [de-identified] : posterior oropharynx and necks

## 2021-12-08 NOTE — HISTORY OF PRESENT ILLNESS
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  He is s/p ChemoRadiation. Weekly CRT -- 6 cycles Cisplatin completed under Dr. Vidal, and 7000 cGy radiation therapy completed to the left tonsil and neck on 1/16/2020.\par \par \par

## 2021-12-08 NOTE — REVIEW OF SYSTEMS
[Negative] : Allergic/Immunologic [Mucositis Oral: Grade 0] : Mucositis Oral: Grade 0  [Xerostomia: Grade 1 - Symptomatic (e.g., dry or thick saliva) without significant dietary alteration; unstimulated saliva flow >0.2 ml/min] : Xerostomia: Grade 1 - Symptomatic (e.g., dry or thick saliva) without significant dietary alteration; unstimulated saliva flow >0.2 ml/min [Oral Pain: Grade 0] : Oral Pain: Grade 0 [Dysgeusia: Grade 1- Altered taste but no change in diet] : Dysgeusia: Grade 1 - Altered taste but no change in diet [FreeTextEntry2] : maintaining weight [FreeTextEntry4] : oral dryness continue, modest and tolerable and mild taste alterations

## 2021-12-10 ENCOUNTER — APPOINTMENT (OUTPATIENT)
Dept: OTOLARYNGOLOGY | Facility: CLINIC | Age: 81
End: 2021-12-10
Payer: MEDICARE

## 2021-12-10 VITALS
BODY MASS INDEX: 24.24 KG/M2 | HEIGHT: 72 IN | SYSTOLIC BLOOD PRESSURE: 168 MMHG | HEART RATE: 75 BPM | DIASTOLIC BLOOD PRESSURE: 77 MMHG | WEIGHT: 179 LBS

## 2021-12-10 PROCEDURE — 99214 OFFICE O/P EST MOD 30 MIN: CPT | Mod: 25

## 2021-12-10 PROCEDURE — 31575 DIAGNOSTIC LARYNGOSCOPY: CPT

## 2021-12-10 RX ORDER — LORAZEPAM 0.5 MG/1
0.5 TABLET ORAL
Qty: 90 | Refills: 0 | Status: COMPLETED | COMMUNITY
Start: 2019-12-06 | End: 2021-12-10

## 2021-12-10 NOTE — HISTORY OF PRESENT ILLNESS
[de-identified] : 81 year old male s/p tonsil SCCa. 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 Pt completed chemo with Dr. Vidal 1/14/2020 and radiation on 1/16/2020 with Dr. Brown. last imaging in 7/8/2021 MICHELLE.Patient reports "feeling very good". Patients reports improvement in swallowing. Patient report sense of taste is at about 50%. Patient reports tolerating soft diet and liquids-weight is stable since last clinic visit. Patient reports loosing teeth-s/p teeth implants on right side of mouth, pending crown procedure 1/2022.. Patient reports dry mouth continues-states he drinks a lot of water and using Listerine- states mouthwash helps. Denies dysphagia, odynophagia, aspirations, dyspnea, dysphonia, otalgia.

## 2021-12-10 NOTE — PHYSICAL EXAM
[Laryngoscopy Performed] : laryngoscopy was performed, see procedure section for findings [Normal] : no rashes [de-identified] : Posttreatment changes, no LAD. [FreeTextEntry1] : Posttreatment changes along the L. OPx.  No concerning masses or lesions along the OC or OPx.

## 2021-12-10 NOTE — PROCEDURE
[Gag Reflex] : gag reflex preventing mirror examination [None] : none [Flexible Endoscope] : examined with the flexible endoscope [Serial Number: ___] : Serial Number: [unfilled] [de-identified] : No lesions in the NPx, OPx, HPx or larynx.  Stable posttreatment changes, VC are mobile, airway patent.\par  [de-identified] : Tonsil SCCa

## 2022-01-12 NOTE — ADDENDUM
[FreeTextEntry1] : I, Sanford Capps, acted solely as a scribe for Dr. Selena Vidal on 10/31/19.\par  
Warm

## 2022-01-17 NOTE — H&P PST ADULT - PAIN LOCATION, PROFILE
Left message for patient to return call regarding pre procedure instructions.  See prep for case encounter for further details.   mouth

## 2022-01-28 ENCOUNTER — APPOINTMENT (OUTPATIENT)
Dept: CT IMAGING | Facility: CLINIC | Age: 82
End: 2022-01-28
Payer: MEDICARE

## 2022-01-28 ENCOUNTER — OUTPATIENT (OUTPATIENT)
Dept: OUTPATIENT SERVICES | Facility: HOSPITAL | Age: 82
LOS: 1 days | End: 2022-01-28

## 2022-01-28 DIAGNOSIS — Z90.49 ACQUIRED ABSENCE OF OTHER SPECIFIED PARTS OF DIGESTIVE TRACT: Chronic | ICD-10-CM

## 2022-01-28 DIAGNOSIS — C09.9 MALIGNANT NEOPLASM OF TONSIL, UNSPECIFIED: ICD-10-CM

## 2022-01-28 DIAGNOSIS — Z98.49 CATARACT EXTRACTION STATUS, UNSPECIFIED EYE: Chronic | ICD-10-CM

## 2022-01-28 PROCEDURE — 70491 CT SOFT TISSUE NECK W/DYE: CPT | Mod: 26

## 2022-01-28 PROCEDURE — 71260 CT THORAX DX C+: CPT | Mod: 26

## 2022-04-22 ENCOUNTER — OUTPATIENT (OUTPATIENT)
Dept: OUTPATIENT SERVICES | Facility: HOSPITAL | Age: 82
LOS: 1 days | End: 2022-04-22

## 2022-04-22 ENCOUNTER — APPOINTMENT (OUTPATIENT)
Dept: CT IMAGING | Facility: CLINIC | Age: 82
End: 2022-04-22
Payer: MEDICARE

## 2022-04-22 DIAGNOSIS — C09.9 MALIGNANT NEOPLASM OF TONSIL, UNSPECIFIED: ICD-10-CM

## 2022-04-22 DIAGNOSIS — Z90.49 ACQUIRED ABSENCE OF OTHER SPECIFIED PARTS OF DIGESTIVE TRACT: Chronic | ICD-10-CM

## 2022-04-22 DIAGNOSIS — Z98.49 CATARACT EXTRACTION STATUS, UNSPECIFIED EYE: Chronic | ICD-10-CM

## 2022-04-22 PROCEDURE — 71250 CT THORAX DX C-: CPT | Mod: 26,MG

## 2022-04-22 PROCEDURE — G1004: CPT

## 2022-04-27 ENCOUNTER — NON-APPOINTMENT (OUTPATIENT)
Age: 82
End: 2022-04-27

## 2022-06-07 ENCOUNTER — APPOINTMENT (OUTPATIENT)
Dept: RADIATION ONCOLOGY | Facility: CLINIC | Age: 82
End: 2022-06-07
Payer: MEDICARE

## 2022-06-07 VITALS
DIASTOLIC BLOOD PRESSURE: 89 MMHG | WEIGHT: 177 LBS | BODY MASS INDEX: 24.01 KG/M2 | RESPIRATION RATE: 16 BRPM | HEART RATE: 75 BPM | SYSTOLIC BLOOD PRESSURE: 158 MMHG

## 2022-06-07 PROCEDURE — 99213 OFFICE O/P EST LOW 20 MIN: CPT

## 2022-06-07 NOTE — ASU PREOP CHECKLIST - BSA (M2)
-- DO NOT REPLY / DO NOT REPLY ALL --  -- Message is from the [facility]--    General Patient Message      Reason for Call: patient is calling to speak with blood pressure questions and concerns stated blood pressure has been fluctuating daily also stated she has two  monitors at home which one stated her heartbeat is irregular the other does not patient is not sure of accuracy and not having any heart issues but concerned as far as blood pressure and would like to come in as soon as possible to see a nurse as early as in the morning readings   At 215p.m  156 /85 at 247p.m 170 /85 ( which is when heart monitor was irregular  at 331 p.m 140 /95  triaged call with acc - declined again     Caller Information       Type Contact Phone    06/07/2022 03:34 PM CDT Phone (Incoming) Wair Milligan, Mable (Self) [phone] ()          Alternative phone number: none    Turnaround time given to caller:   \"This message will be sent to [state Provider's name]. The clinical team will fulfill your request as soon as they review your message.\"     2.06

## 2022-06-08 NOTE — REVIEW OF SYSTEMS
[Mucositis Oral: Grade 0] : Mucositis Oral: Grade 0  [Xerostomia: Grade 1 - Symptomatic (e.g., dry or thick saliva) without significant dietary alteration; unstimulated saliva flow >0.2 ml/min] : Xerostomia: Grade 1 - Symptomatic (e.g., dry or thick saliva) without significant dietary alteration; unstimulated saliva flow >0.2 ml/min [Oral Pain: Grade 0] : Oral Pain: Grade 0 [Dysgeusia: Grade 1- Altered taste but no change in diet] : Dysgeusia: Grade 1 - Altered taste but no change in diet [Negative] : Constitutional [FreeTextEntry2] : maintaining weight [FreeTextEntry4] : oral dryness continue, modest and tolerable and mild taste alterations

## 2022-06-08 NOTE — PHYSICAL EXAM
[Normal] : oriented to person, place and time, the affect was normal, the mood was normal and not anxious [de-identified] : somewhat dry oral cavity. partially edentulous

## 2022-06-08 NOTE — DISEASE MANAGEMENT
[Clinical] : TNM Stage: c [IV] : IV [FreeTextEntry4] : squamous cell carcinoma [TTNM] : 4b [NTNM] : 2b [MTNM] : 0 [de-identified] : 7000 cGy [de-identified] : posterior oropharynx and necks

## 2022-06-08 NOTE — LETTER CLOSING
[Sincerely yours,] : Sincerely yours, [FreeTextEntry3] : Nimesh Brown MD\par Physician in Chief\par Department of Radiation Medicine\par [facility] [place]\par [facility] [place]\par \par  of Radiation Medicine\par [facility]\par at  hospitals/[facility]\par \par Radiation \par [facility]/\par [facility] at [place]\par [address]\par [place], [place] [address]\par \par Tel: [phone]\par Fax: [phone]\par

## 2022-06-08 NOTE — HISTORY OF PRESENT ILLNESS
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  He is s/p ChemoRadiation. Weekly CRT -- 6 cycles Cisplatin completed at the direction of Dr. Vidal, and 7000 cGy radiation therapy to the left tonsil and neck, completed on 1/16/2020. Aside from chronic modest oral dryness, no complaints.\par \par \par

## 2022-06-13 ENCOUNTER — APPOINTMENT (OUTPATIENT)
Dept: OTOLARYNGOLOGY | Facility: CLINIC | Age: 82
End: 2022-06-13
Payer: MEDICARE

## 2022-06-13 VITALS
SYSTOLIC BLOOD PRESSURE: 146 MMHG | BODY MASS INDEX: 23.57 KG/M2 | HEIGHT: 72 IN | DIASTOLIC BLOOD PRESSURE: 73 MMHG | HEART RATE: 73 BPM | OXYGEN SATURATION: 98 % | WEIGHT: 174 LBS

## 2022-06-13 PROCEDURE — 99214 OFFICE O/P EST MOD 30 MIN: CPT | Mod: 25

## 2022-06-13 PROCEDURE — 31575 DIAGNOSTIC LARYNGOSCOPY: CPT

## 2022-06-13 NOTE — HISTORY OF PRESENT ILLNESS
[de-identified] : 81 year old male s/p tonsil SCCa. 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 Pt completed chemo with Dr. Vidal 1/14/2020 and radiation on 1/16/2020 with Dr. Brown. last imaging in chest 4/2022 MICHELLE. Patient reports "feeling very good". Patients reports improvement in swallowing, sometime regurd of food back up to nose with liquid.  Patient report sense of taste is at about 50%. weight is stable. Patient is working with dentist and oral surgeon on the teeth. Denies dysphagia, odynophagia, aspirations, dyspnea, dysphonia, otalgia.

## 2022-06-13 NOTE — PHYSICAL EXAM
[Laryngoscopy Performed] : laryngoscopy was performed, see procedure section for findings [Normal] : no rashes [de-identified] : Posttreatment changes, no LAD. [FreeTextEntry1] : Posttreatment changes along the L. OPx.  No concerning masses or lesions along the OC or OPx.

## 2022-06-13 NOTE — PROCEDURE
[Dysphagia] : dysphagia not clearly evaluated by indirect laryngoscopy [None] : none [Flexible Endoscope] : examined with the flexible endoscope [Serial Number: ___] : Serial Number: [unfilled] [de-identified] : No lesions in the NPx, OPx, HPx or larynx.  Expected posttreatment changes, VC are mobile, airway patent.\par  [de-identified] : tonsil SCCa

## 2022-12-05 ENCOUNTER — APPOINTMENT (OUTPATIENT)
Dept: CT IMAGING | Facility: CLINIC | Age: 82
End: 2022-12-05
Payer: MEDICARE

## 2022-12-05 ENCOUNTER — OUTPATIENT (OUTPATIENT)
Dept: OUTPATIENT SERVICES | Facility: HOSPITAL | Age: 82
LOS: 1 days | End: 2022-12-05

## 2022-12-05 DIAGNOSIS — Z90.49 ACQUIRED ABSENCE OF OTHER SPECIFIED PARTS OF DIGESTIVE TRACT: Chronic | ICD-10-CM

## 2022-12-05 DIAGNOSIS — C09.9 MALIGNANT NEOPLASM OF TONSIL, UNSPECIFIED: ICD-10-CM

## 2022-12-05 DIAGNOSIS — Z98.49 CATARACT EXTRACTION STATUS, UNSPECIFIED EYE: Chronic | ICD-10-CM

## 2022-12-05 PROCEDURE — 71260 CT THORAX DX C+: CPT | Mod: 26

## 2022-12-05 PROCEDURE — 70491 CT SOFT TISSUE NECK W/DYE: CPT | Mod: 26

## 2022-12-12 ENCOUNTER — APPOINTMENT (OUTPATIENT)
Dept: OTOLARYNGOLOGY | Facility: CLINIC | Age: 82
End: 2022-12-12
Payer: MEDICARE

## 2022-12-12 VITALS
DIASTOLIC BLOOD PRESSURE: 76 MMHG | BODY MASS INDEX: 24.79 KG/M2 | WEIGHT: 183 LBS | OXYGEN SATURATION: 97 % | HEART RATE: 82 BPM | HEIGHT: 72 IN | SYSTOLIC BLOOD PRESSURE: 164 MMHG

## 2022-12-12 DIAGNOSIS — R91.1 SOLITARY PULMONARY NODULE: ICD-10-CM

## 2022-12-12 PROCEDURE — 31575 DIAGNOSTIC LARYNGOSCOPY: CPT

## 2022-12-12 PROCEDURE — 99214 OFFICE O/P EST MOD 30 MIN: CPT | Mod: 25

## 2022-12-12 NOTE — PROCEDURE
[Gag Reflex] : gag reflex preventing mirror examination [None] : none [Flexible Endoscope] : examined with the flexible endoscope [Serial Number: ___] : Serial Number: [unfilled] [de-identified] : No lesions in the NPx, OPx, HPx or larynx.  Expected posttreatment changes, VC are mobile, airway patent.\par  [de-identified] : tonsil SCCa

## 2022-12-12 NOTE — HISTORY OF PRESENT ILLNESS
[de-identified] : 82 year old male s/p tonsil SCCa. 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 Pt completed chemo with Dr. Vidal 1/14/2020 and radiation on 1/16/2020 with Dr. Brown. Last imaging neck 12/5/2022 and chest report 1.6 cm ill-defined patchy opacity, increase in size. pt is doing well, and gaining wt. pt is working with implants of the teeth.  Denies dysphagia, odynophagia, aspirations, dyspnea, dysphonia, otalgia.

## 2022-12-12 NOTE — PHYSICAL EXAM
[Laryngoscopy Performed] : laryngoscopy was performed, see procedure section for findings [Normal] : no rashes [de-identified] : Posttreatment changes, no LAD. [FreeTextEntry1] : Posttreatment changes along the L. OPx.  No concerning masses or lesions along the OC or OPx.

## 2022-12-13 ENCOUNTER — APPOINTMENT (OUTPATIENT)
Dept: RADIATION ONCOLOGY | Facility: CLINIC | Age: 82
End: 2022-12-13
Payer: MEDICARE

## 2022-12-13 VITALS
SYSTOLIC BLOOD PRESSURE: 164 MMHG | DIASTOLIC BLOOD PRESSURE: 94 MMHG | WEIGHT: 187 LBS | RESPIRATION RATE: 16 BRPM | OXYGEN SATURATION: 99 % | BODY MASS INDEX: 25.36 KG/M2 | HEART RATE: 82 BPM

## 2022-12-13 PROCEDURE — 99213 OFFICE O/P EST LOW 20 MIN: CPT

## 2022-12-14 NOTE — LETTER CLOSING
[Sincerely yours,] : Sincerely yours, [FreeTextEntry3] : Nimesh Brown MD\par Physician in Chief\par Department of Radiation Medicine\par [facility] [place]\par [facility] [place]\par \par  of Radiation Medicine\par [facility]\par at  [facility]\par \par Radiation \par [facility]/\par [facility] [place]\par [address]\par [place], [place] [address]\par \par Tel: [phone]\par Fax: [phone]\par

## 2022-12-14 NOTE — PHYSICAL EXAM
[Normal] : oriented to person, place and time, the affect was normal, the mood was normal and not anxious [de-identified] : somewhat dry oral cavity. partially edentulous

## 2022-12-14 NOTE — ASSESSMENT
[No evidence of disease] : No evidence of disease [FreeTextEntry1] : moderate stable expected treatment related sequelae, including accelerated dental decay

## 2022-12-14 NOTE — DISEASE MANAGEMENT
[Clinical] : TNM Stage: c [IV] : IV [FreeTextEntry4] : squamous cell carcinoma [TTNM] : 4b [NTNM] : 2b [MTNM] : 0 [de-identified] : 7000 cGy [de-identified] : posterior oropharynx and necks

## 2022-12-14 NOTE — HISTORY OF PRESENT ILLNESS
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  He is s/p ChemoRadiation. Weekly CRT -- 6 cycles Cisplatin completed at the direction of Dr. Vidal, and 7000 cGy radiation therapy to the left tonsil and neck, completed on 1/16/2020. \par \par Denies pain.\par Reports eating and drinking without difficulty aside from mild chronic oral dryness. States he is undergoing extensive restorative dental work.\par \par \par

## 2023-01-12 ENCOUNTER — NON-APPOINTMENT (OUTPATIENT)
Age: 83
End: 2023-01-12

## 2023-02-27 ENCOUNTER — APPOINTMENT (OUTPATIENT)
Dept: THORACIC SURGERY | Facility: CLINIC | Age: 83
End: 2023-02-27

## 2023-04-24 ENCOUNTER — APPOINTMENT (OUTPATIENT)
Dept: THORACIC SURGERY | Facility: CLINIC | Age: 83
End: 2023-04-24

## 2023-04-24 DIAGNOSIS — R91.1 SOLITARY PULMONARY NODULE: ICD-10-CM

## 2023-05-09 PROBLEM — R91.1 LUNG NODULE: Status: ACTIVE | Noted: 2023-02-21

## 2023-05-09 NOTE — HISTORY OF PRESENT ILLNESS
[FreeTextEntry1] : Jovanni Warren is a 82 year male who presents for a consultation, referred by Dr. 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 He is s/p ChemoRadiation.

## 2023-05-09 NOTE — CONSULT LETTER
[FreeTextEntry2] : DR. ARIEL FULLER MD [FreeTextEntry3] : Rock Benton MD\par Cardiovascular and Thoracic Surgery\par [facility]\par [address] [place] [address]\[place] [address]\par Tel. [phone]\par Fax [phone]\par

## 2023-05-09 NOTE — DATA REVIEWED
[FreeTextEntry1] : CT CHEST IC  - ORDERED BY: ARIEL FULLER\par \par PROCEDURE DATE:  12/05/2022\par \par INTERPRETATION:  Clinical information: Tonsillar cancer. Exam is compared to previous study of 4/22/2022.\par \par CT scan of the chest was obtained following administration of intravenous contrast. Approximately 50 cc of Omnipaque 350 was administered and 0 cc was discarded.\par \par No hilar and or mediastinal adenopathy is noted.\par \par Heart is normal in size. Calcification of the aortic valve and the coronary arteries is noted. No pericardial effusion is noted.\par \par No endobronchial lesions are noted. An ill-defined patchy opacity measuring approximately 1.6 cm noted in the right upper lobe. It has slightly increased in size and density when compared to previous exam. Minimal post relation therapy changes are present in the apical segments of both upper lobes. Linear opacity representing atelectasis is noted in the left lower lobe. No pleural effusions are noted.\par \par Below the diaphragm, visualized portions of the abdomen demonstrate nodular contour to the surface of the liver. Patient is status post cholecystectomy.\par \par Degenerative changes of the spine are noted.\par \par IMPRESSION: A 1.6 cm ill-defined patchy opacity is present in the right upper lobe. It has increased in size and density when compared to previous exam. Exact etiology is unclear.\par \par --- End of Report ---\par \par ROSSANA HOLLINGSWORTH MD; Attending Radiologist

## 2023-05-15 ENCOUNTER — APPOINTMENT (OUTPATIENT)
Dept: THORACIC SURGERY | Facility: CLINIC | Age: 83
End: 2023-05-15

## 2023-06-02 ENCOUNTER — OUTPATIENT (OUTPATIENT)
Dept: OUTPATIENT SERVICES | Facility: HOSPITAL | Age: 83
LOS: 1 days | End: 2023-06-02

## 2023-06-02 ENCOUNTER — APPOINTMENT (OUTPATIENT)
Dept: CT IMAGING | Facility: CLINIC | Age: 83
End: 2023-06-02
Payer: MEDICARE

## 2023-06-02 DIAGNOSIS — C09.9 MALIGNANT NEOPLASM OF TONSIL, UNSPECIFIED: ICD-10-CM

## 2023-06-02 DIAGNOSIS — Z90.49 ACQUIRED ABSENCE OF OTHER SPECIFIED PARTS OF DIGESTIVE TRACT: Chronic | ICD-10-CM

## 2023-06-02 DIAGNOSIS — Z98.49 CATARACT EXTRACTION STATUS, UNSPECIFIED EYE: Chronic | ICD-10-CM

## 2023-06-02 PROCEDURE — 70491 CT SOFT TISSUE NECK W/DYE: CPT | Mod: 26

## 2023-06-02 PROCEDURE — 71260 CT THORAX DX C+: CPT | Mod: 26

## 2023-06-07 NOTE — DISEASE MANAGEMENT
[Clinical] : TNM Stage: c [FreeTextEntry4] : squamous cell carcinoma [NTNM] : 2b [TTNM] : 4b [MTNM] : 0 [IV] : IV

## 2023-06-07 NOTE — HISTORY OF PRESENT ILLNESS
[FreeTextEntry1] : 82 year old man returns today s/p 7,000cGy to the head & neck for a [id number] lV SCCA of the left tonsil completed 1/16/2020.\par \par 12/5/22 Ct chest:\par IMPRESSION: A 1.6 cm ill-defined patchy opacity is present in the right upper lobe. It has increased in size and density when compared to previous exam. Exact etiology is unclear.\par Ct neck:\par IMPRESSION:\par No evidence of recurrent or metastatic disease.\par \par He was referred to  Dr Rock Benton on 5/15/23 but did not keep the appointment \par \par 6/2/23 Ct neck :\par IMPRESSION:\par Posttreatment changes in the oropharynx. No recurrent mass identified. No new cervical lymphadenopathy.\par \par Ct chest:\par \par Dr. Wilfred Seymour 12/12/22 next 6/12/23

## 2023-06-12 ENCOUNTER — APPOINTMENT (OUTPATIENT)
Dept: OTOLARYNGOLOGY | Facility: CLINIC | Age: 83
End: 2023-06-12
Payer: MEDICARE

## 2023-06-12 VITALS
SYSTOLIC BLOOD PRESSURE: 155 MMHG | DIASTOLIC BLOOD PRESSURE: 82 MMHG | TEMPERATURE: 96.6 F | BODY MASS INDEX: 24.92 KG/M2 | OXYGEN SATURATION: 98 % | HEIGHT: 72 IN | HEART RATE: 80 BPM | WEIGHT: 184 LBS

## 2023-06-12 DIAGNOSIS — C09.9 MALIGNANT NEOPLASM OF TONSIL, UNSPECIFIED: ICD-10-CM

## 2023-06-12 DIAGNOSIS — R13.12 DYSPHAGIA, OROPHARYNGEAL PHASE: ICD-10-CM

## 2023-06-12 PROCEDURE — 99214 OFFICE O/P EST MOD 30 MIN: CPT | Mod: 25

## 2023-06-12 PROCEDURE — 31575 DIAGNOSTIC LARYNGOSCOPY: CPT

## 2023-06-12 NOTE — PHYSICAL EXAM
[Laryngoscopy Performed] : laryngoscopy was performed, see procedure section for findings [Normal] : no rashes [de-identified] : Posttreatment changes, no LAD. [FreeTextEntry1] : Posttreatment changes along the L. OPx.  No concerning masses or lesions along the OC or OPx.

## 2023-06-12 NOTE — PROCEDURE
[Gag Reflex] : gag reflex preventing mirror examination [None] : none [Flexible Endoscope] : examined with the flexible endoscope [Serial Number: ___] : Serial Number: [unfilled] [de-identified] : No lesions in the NPx, OPx, HPx or larynx.  Expected posttreatment changes, VC are mobile, airway patent.\par  [de-identified] : tonsil SCCa

## 2023-06-12 NOTE — HISTORY OF PRESENT ILLNESS
[de-identified] : 82 year old male s/p tonsil SCCa. 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 Pt completed chemo with Dr. Vidal 1/14/2020 and radiation on 1/16/2020 with Dr. Brown. 12/5/2022 chest report 1.6 cm ill-defined patchy opacity, increase in size. repeat ct of neck and chest 6/2023 MICHELLE.  Pt is doing well, and gaining wt. pt is working on dental implants and temporary denture for  3 months and c/o gums sore and inner lip irritation.  Denies dysphagia, odynophagia, aspirations, dyspnea, dysphonia, otalgia.

## 2023-06-13 ENCOUNTER — APPOINTMENT (OUTPATIENT)
Dept: RADIATION ONCOLOGY | Facility: CLINIC | Age: 83
End: 2023-06-13
Payer: MEDICARE

## 2023-07-25 ENCOUNTER — APPOINTMENT (OUTPATIENT)
Dept: RADIATION ONCOLOGY | Facility: CLINIC | Age: 83
End: 2023-07-25
Payer: MEDICARE

## 2023-07-25 VITALS
SYSTOLIC BLOOD PRESSURE: 146 MMHG | RESPIRATION RATE: 16 BRPM | HEIGHT: 72 IN | DIASTOLIC BLOOD PRESSURE: 80 MMHG | HEART RATE: 77 BPM | BODY MASS INDEX: 25.19 KG/M2 | WEIGHT: 186 LBS | OXYGEN SATURATION: 97 %

## 2023-07-25 PROCEDURE — 99213 OFFICE O/P EST LOW 20 MIN: CPT

## 2023-07-27 NOTE — ASSESSMENT
[No evidence of disease] : No evidence of disease [FreeTextEntry1] : moderate stable treatment related sequelae.

## 2023-07-27 NOTE — HISTORY OF PRESENT ILLNESS
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  He is s/p ChemoRadiation. Weekly CRT -- 6 cycles Cisplatin completed at the direction of Dr. Vidal, and 7000 cGy radiation therapy to the left tonsil and neck, completed on 1/16/2020. \par \par Denies pain.\par Reports eating and drinking without difficulty aside from mild chronic oral dryness. States he is undergoing extensive restorative dental work.\par \par CT neck 6/2/23 \par IMPRESSION:\par Posttreatment changes in the oropharynx. No recurrent mass identified. No new cervical lymphadenopathy.

## 2023-07-27 NOTE — PHYSICAL EXAM
[Normal] : oriented to person, place and time, the affect was normal, the mood was normal and not anxious [de-identified] : partially edentulous ; oropharynx with scar tissue posteriorly L>R

## 2023-07-27 NOTE — LETTER GREETING
[Dear Doctor] : Dear Doctor, [Follow-Up] : Your patient, [unfilled] was seen in my office today for follow-up [Please see my note below.] : Please see my note below. [FreeTextEntry2] : Wilfred Seymour MD\par Selena Vidal MD

## 2023-07-27 NOTE — LETTER CLOSING
[Sincerely yours,] : Sincerely yours, [FreeTextEntry3] : Nimesh Brown MD\par Physician in Chief\par Department of Radiation Medicine\par [facility] [employer]\par [facility] [place]\par \par  of Radiation Medicine\par [facility]\par at  [facility]\par \par Radiation \par [facility]/\par [facility] [place]\par [address]\par [place], [place] [address]\par \par Tel: [phone]\par Fax: [phone]\par

## 2023-07-27 NOTE — DISEASE MANAGEMENT
[Clinical] : TNM Stage: c [IV] : IV [FreeTextEntry4] : squamous cell carcinoma [TTNM] : 4b [NTNM] : 2b [MTNM] : 0 [de-identified] : 7000 cGy [de-identified] : posterior oropharynx and necks

## 2023-10-25 ENCOUNTER — APPOINTMENT (OUTPATIENT)
Dept: RADIATION ONCOLOGY | Facility: CLINIC | Age: 83
End: 2023-10-25
Payer: MEDICARE

## 2023-10-25 VITALS
HEIGHT: 72 IN | HEART RATE: 79 BPM | SYSTOLIC BLOOD PRESSURE: 190 MMHG | WEIGHT: 184 LBS | DIASTOLIC BLOOD PRESSURE: 70 MMHG | BODY MASS INDEX: 24.92 KG/M2 | OXYGEN SATURATION: 99 % | RESPIRATION RATE: 16 BRPM

## 2023-10-25 DIAGNOSIS — K11.7 DISTURBANCES OF SALIVARY SECRETION: ICD-10-CM

## 2023-10-25 DIAGNOSIS — Z85.818 PERSONAL HISTORY OF MALIGNANT NEOPLASM OF OTHER SITES OF LIP, ORAL CAVITY, AND PHARYNX: ICD-10-CM

## 2023-10-25 DIAGNOSIS — Z92.3 PERSONAL HISTORY OF IRRADIATION: ICD-10-CM

## 2023-10-25 DIAGNOSIS — Z92.21 PERSONAL HISTORY OF ANTINEOPLASTIC CHEMOTHERAPY: ICD-10-CM
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
## 2023-10-25 PROCEDURE — 99213 OFFICE O/P EST LOW 20 MIN: CPT

## 2023-10-28 PROBLEM — K11.7 XEROSTOMIA DUE TO RADIOTHERAPY: Status: ACTIVE | Noted: 2021-08-25

## 2023-10-28 PROBLEM — Z92.21 PERSONAL HISTORY OF CHEMOTHERAPY: Status: ACTIVE | Noted: 2022-12-14

## 2023-10-28 PROBLEM — Z85.818 HISTORY OF CANCER TONSIL: Status: ACTIVE | Noted: 2022-12-14

## 2023-10-28 PROBLEM — Z92.3 HISTORY OF RADIATION TO HEAD AND NECK REGION: Status: ACTIVE | Noted: 2020-03-04

## 2023-12-01 ENCOUNTER — LABORATORY RESULT (OUTPATIENT)
Age: 83
End: 2023-12-01

## 2023-12-04 LAB
ALBUMIN SERPL ELPH-MCNC: 4.6 G/DL
ALP BLD-CCNC: 85 U/L
ALT SERPL-CCNC: 9 U/L
ANION GAP SERPL CALC-SCNC: 11 MMOL/L
AST SERPL-CCNC: 13 U/L
BASOPHILS # BLD AUTO: 0.06 K/UL
BASOPHILS NFR BLD AUTO: 1 %
BILIRUB SERPL-MCNC: 0.5 MG/DL
BUN SERPL-MCNC: 14 MG/DL
CALCIUM SERPL-MCNC: 9.9 MG/DL
CHLORIDE SERPL-SCNC: 96 MMOL/L
CO2 SERPL-SCNC: 26 MMOL/L
CREAT SERPL-MCNC: 1.35 MG/DL
EGFR: 52 ML/MIN/1.73M2
EOSINOPHIL # BLD AUTO: 0.29 K/UL
EOSINOPHIL NFR BLD AUTO: 5.1 %
GLUCOSE SERPL-MCNC: 87 MG/DL
HCT VFR BLD CALC: 42.1 %
HGB BLD-MCNC: 13.9 G/DL
IMM GRANULOCYTES NFR BLD AUTO: 0.5 %
LYMPHOCYTES # BLD AUTO: 1.3 K/UL
LYMPHOCYTES NFR BLD AUTO: 22.7 %
MAN DIFF?: NORMAL
MCHC RBC-ENTMCNC: 30.7 PG
MCHC RBC-ENTMCNC: 33 GM/DL
MCV RBC AUTO: 92.9 FL
MONOCYTES # BLD AUTO: 0.61 K/UL
MONOCYTES NFR BLD AUTO: 10.7 %
NEUTROPHILS # BLD AUTO: 3.43 K/UL
NEUTROPHILS NFR BLD AUTO: 60 %
PLATELET # BLD AUTO: 304 K/UL
POTASSIUM SERPL-SCNC: 4.9 MMOL/L
PROT SERPL-MCNC: 7.3 G/DL
RBC # BLD: 4.53 M/UL
RBC # FLD: 14.6 %
SODIUM SERPL-SCNC: 134 MMOL/L
TSH SERPL-ACNC: 7.37 UIU/ML
WBC # FLD AUTO: 5.72 K/UL

## 2023-12-11 ENCOUNTER — APPOINTMENT (OUTPATIENT)
Dept: OTOLARYNGOLOGY | Facility: CLINIC | Age: 83
End: 2023-12-11

## 2023-12-13 NOTE — ASSESSMENT
Met with patient and her two daughters, Gauri and Casandra,  for  discharge teaching.  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  Sections reviewed were: First Steps, Appointments and Prevention.  Medications will be reviewed by Pharm D.  Allowed time for questions and answers.  Asked patient and and caregivers to complete the review questions in the discharge book.       [No evidence of disease] : No evidence of disease [FreeTextEntry1] : moderate resolving expected  treatment related sequelae

## 2023-12-27 NOTE — PROCEDURE
[Gag Reflex] : gag reflex preventing mirror examination [None] : none [Flexible Endoscope] : examined with the flexible endoscope [Serial Number: ___] : Serial Number: [unfilled] [de-identified] : No lesions in the NPx, OPx, HPx or larynx.  Expected posttreatment changes, VC are mobile, airway patent.\par   [de-identified] : tonsil SCCa

## 2023-12-27 NOTE — HISTORY OF PRESENT ILLNESS
[de-identified] : 83 year old male s/p tonsil SCCa. 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 Pt completed chemo with Dr. Vidal 1/14/2020 and radiation on 1/16/2020 with Dr. Brown. 12/5/2022 chest report 1.6 cm ill-defined patchy opacity, increase in size. repeat ct of neck and chest 6/2023 MICHELLE.   Pt is doing well, and gaining wt.  Pt did not get CTs done yet  pt is working on dental implants and temporary denture and c/o gums sore and inner lip irritation.  Denies dysphagia, odynophagia, aspirations, dyspnea, dysphonia, otalgia.

## 2023-12-27 NOTE — PHYSICAL EXAM
[Normal] : no rashes [Laryngoscopy Performed] : laryngoscopy was performed, see procedure section for findings [de-identified] : Posttreatment changes, no LAD. [FreeTextEntry1] : Posttreatment changes along the L. OPx.  No concerning masses or lesions along the OC or OPx.

## 2024-02-19 NOTE — ASSESSMENT
Physical Therapy Visit    Visit Type: Daily Treatment Note  Visit: 5  Referring Provider: Jackeline Thomas*  Medical Diagnosis (from order): N62 - Macromastia  M54.2 - Neck pain  M54.9 - Back pain, unspecified back location, unspecified back pain laterality, unspecified chronicity     SUBJECTIVE                                                                                                               Feels a little sore in between shoulder blades. Had a really bad back spasm the other day which caused her to be in a lot of pain. Would prefer to work on back. Back spasms do not come along with movement.       OBJECTIVE                                                                                                                                     Treatment     Therapeutic Exercise  UBE retro level 2 x 3 minutes      Exercises to complete when back begins to spasm:   Supine LTR x 8 (B)   Single knee to chest x 30 seconds (B)   Supine piriformis stretch x 30 seconds   Posterior pelvic tilt with 3 second hold x 10   TrA bracing x 10 with 3 second holds     Quadruped:   Cat/cow x 10   Hip extension x 10   Alternating shoulder flexion x 5 (B)  Prone over stability ball T's x 10     Posterior pelvic tilts x 8   Alternating marches x 8; reported some pain in right hip flexor   -Relieved with hip flexor stretch       Skilled input: verbal instruction/cues    Writer verbally educated and received verbal consent for hand placement, positioning of patient, and techniques to be performed today from patient for therapist position for techniques as described above and how they are pertinent to the patient's plan of care.  Home Exercise Program  Access Code: [email or web address]  URL: [email or web address]  Date: 02/12/2024  Prepared by: Ros Tena    Exercises  - Seated Upper Trapezius Stretch  - 1 x daily - 7 x weekly - 3 sets - 30 hold  - Seated Levator Scapulae Stretch  - 1 x daily - 7 x weekly - 3  [Curative] : Goals of care discussed with patient: Curative sets - 30 hold  - Supine Suboccipital Release with Tennis Balls  - 1 x daily - 7 x weekly  - Standing Row with Anchored Resistance  - 1 x daily - 7 x weekly - 2 sets - 10 reps  - Shoulder extension with resistance - Neutral  - 1 x daily - 7 x weekly - 2 sets - 10 reps  - Prone Scapular Retraction  - 1 x daily - 7 x weekly - 2 sets - 10 reps  - Prone Scapular Retraction Arms at Side  - 1 x daily - 7 x weekly - 1 sets - 10 reps  - Shoulder External Rotation and Scapular Retraction with Resistance  - 1 x daily - 7 x weekly - 1 sets - 10 reps      ASSESSMENT                                                                                                            Today's session focused on gentle mobility/core activation exercises for when patient has episodes of low back spasms. Gege was challenged with quadruped exercises and continues to require cueing to prevent excessive movement/range of motion due to hypermobility. She did have some over activation in right hip flexor with seated march, but went away after hip flexor stretch. HEP was updated with gentle low back exercises.   Education:   - Results of above outlined education: Verbalizes understanding and Needs reinforcement    PLAN                                                                                                                           Suggestions for next session as indicated: Progress per plan of care; check in on low back exercises, pec stretching, hip flexor stretching/hamstring stretching.        Therapy procedure time and total treatment time can be found documented on the Time Entry flowsheet

## 2024-07-30 ENCOUNTER — APPOINTMENT (OUTPATIENT)
Dept: RADIATION ONCOLOGY | Facility: CLINIC | Age: 84
End: 2024-07-30
Payer: MEDICARE

## 2024-07-30 VITALS
RESPIRATION RATE: 16 BRPM | HEIGHT: 72 IN | HEART RATE: 74 BPM | WEIGHT: 181 LBS | OXYGEN SATURATION: 99 % | SYSTOLIC BLOOD PRESSURE: 153 MMHG | BODY MASS INDEX: 24.52 KG/M2 | DIASTOLIC BLOOD PRESSURE: 93 MMHG

## 2024-07-30 DIAGNOSIS — Z85.818 PERSONAL HISTORY OF MALIGNANT NEOPLASM OF OTHER SITES OF LIP, ORAL CAVITY, AND PHARYNX: ICD-10-CM

## 2024-07-30 DIAGNOSIS — K11.7 DISTURBANCES OF SALIVARY SECRETION: ICD-10-CM

## 2024-07-30 DIAGNOSIS — Z92.3 PERSONAL HISTORY OF IRRADIATION: ICD-10-CM

## 2024-07-30 DIAGNOSIS — Z92.21 PERSONAL HISTORY OF ANTINEOPLASTIC CHEMOTHERAPY: ICD-10-CM
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
## 2024-07-30 PROCEDURE — 99213 OFFICE O/P EST LOW 20 MIN: CPT

## 2024-07-30 PROCEDURE — G2211 COMPLEX E/M VISIT ADD ON: CPT

## 2024-07-31 NOTE — DISEASE MANAGEMENT
[Clinical] : TNM Stage: c [IV] : IV [FreeTextEntry4] : squamous cell carcinoma [TTNM] : 4b [NTNM] : 2b [MTNM] : 0 [de-identified] : 7000 cGy [de-identified] : left tonsil and neck

## 2024-07-31 NOTE — PHYSICAL EXAM
[Normal] : oriented to person, place and time, the affect was normal, the mood was normal and not anxious [de-identified] : stable scar tissue over left tonsillar fossa. Somewhat dry oral cavity.

## 2024-07-31 NOTE — DISEASE MANAGEMENT
[Clinical] : TNM Stage: c [IV] : IV [FreeTextEntry4] : squamous cell carcinoma [TTNM] : 4b [NTNM] : 2b [MTNM] : 0 [de-identified] : 7000 cGy [de-identified] : left tonsil and neck

## 2024-07-31 NOTE — HISTORY OF PRESENT ILLNESS
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 He is s/p ChemoRadiation. Weekly CRT -- 6 cycles Cisplatin completed at the direction of Dr. Vidal, and 7000 cGy radiation therapy to the left tonsil and neck, completed on 1/16/2020.  Patient denies any new treatment related sequelae. Notes persistence in oral dryness, but stable since last follow up visit. He also notes liquids taken orally will occasionally come out of left nostril due to defect in palate.

## 2024-07-31 NOTE — LETTER CLOSING
[Sincerely yours,] : Sincerely yours, [FreeTextEntry3] : Nimesh Brown MD Physician in Chief Department of Radiation Medicine [facility]   of Radiation Medicine [employer] at  hospitals/[facility]  Radiation  [facility] at [place] [address]  Tel: [phone] Fax: [phone]

## 2024-07-31 NOTE — LETTER CLOSING
[Sincerely yours,] : Sincerely yours, [FreeTextEntry3] : Nimesh Brown MD Physician in Chief Department of Radiation Medicine [facility]   of Radiation Medicine [employer] at  [facility]  Radiation  [facility] at [place] [address]  Tel: [phone] Fax: [phone]

## 2024-07-31 NOTE — PHYSICAL EXAM
[Normal] : oriented to person, place and time, the affect was normal, the mood was normal and not anxious [de-identified] : stable scar tissue over left tonsillar fossa. Somewhat dry oral cavity.

## 2024-07-31 NOTE — REVIEW OF SYSTEMS
[Negative] : Allergic/Immunologic [FreeTextEntry4] : s/p radiation therapy to the throat; oral dryness.

## 2024-07-31 NOTE — LETTER GREETING
[Dear Doctor] : Dear Doctor, [Follow-Up] : Your patient, [unfilled] was seen in my office today for follow-up [Please see my note below.] : Please see my note below. [FreeTextEntry2] : Wilfred Seymour MD

## 2024-11-11 NOTE — ASU PREOP CHECKLIST - SITE MARKED BY ANESTHESIOLOGIST
FibroScan Transplant Hepatology(Vibration Controlled Transient Elastography)    Date/Time: 11/11/2024 11:00 AM    Performed by: Marvel Cameron NP  Authorized by: Marvel Cameron NP    Diagnosis:  Other    Probe:  M    Universal Protocol: Patient's identity, procedure and site were verified, confirmatory pause was performed.  Discussed procedure including risks and potential complications.  Questions answered.  Patient verbalizes understanding and wishes to proceed with VCTE.     Procedure: After providing explanations of the procedure, patient was placed in the supine position with right arm in maximum abduction to allow optimal exposure of right lateral abdomen.  Patient was briefly assessed, Testing was performed in the mid-axillary location, 50Hz Shear Wave pulses were applied and the resulting Shear Wave and Propagation Speed detected with a 3.5 MHz ultrasonic signal, using the FibroScan probe, Skin to liver capsule distance and liver parenchyma were accessed during the entire examination with the FibroScan probe, Patient was instructed to breathe normally and to abstain from sudden movements during the procedure, allowing for random measurements of liver stiffness. At least 10 Shear Waves were produced, Individual measurements of each Shear Wave were calculated.  Patient tolerated the procedure well with no complications.  Meets discharge criteria as was dismissed.  Rates pain 0 out of 10.  Patient will follow up with ordering provider to review results.    Findings  Median liver stiffness score:  5.5  CAP Reading: dB/m:  214    IQR/med %:  16  Interpretation  Fibrosis interpretation is based on medial liver stiffness - Kilopascal (kPa).    Fibrosis Stage:  F 0-1  Steatosis interpretation is based on controlled attenuation parameter - (dB/m).    Steatosis Grade:  <S1     n/a

## 2025-01-16 ENCOUNTER — APPOINTMENT (OUTPATIENT)
Dept: RADIATION ONCOLOGY | Facility: CLINIC | Age: 85
End: 2025-01-16
Payer: MEDICARE

## 2025-01-16 VITALS
SYSTOLIC BLOOD PRESSURE: 160 MMHG | HEART RATE: 76 BPM | DIASTOLIC BLOOD PRESSURE: 80 MMHG | WEIGHT: 188 LBS | BODY MASS INDEX: 25.47 KG/M2 | HEIGHT: 72 IN | OXYGEN SATURATION: 97 % | RESPIRATION RATE: 16 BRPM

## 2025-01-16 DIAGNOSIS — C09.9 MALIGNANT NEOPLASM OF TONSIL, UNSPECIFIED: ICD-10-CM

## 2025-01-16 DIAGNOSIS — K11.7 DISTURBANCES OF SALIVARY SECRETION: ICD-10-CM

## 2025-01-16 DIAGNOSIS — Z92.21 PERSONAL HISTORY OF ANTINEOPLASTIC CHEMOTHERAPY: ICD-10-CM

## 2025-01-16 DIAGNOSIS — Z92.3 PERSONAL HISTORY OF IRRADIATION: ICD-10-CM
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
## 2025-01-16 PROCEDURE — 99212 OFFICE O/P EST SF 10 MIN: CPT

## 2025-01-16 PROCEDURE — G2211 COMPLEX E/M VISIT ADD ON: CPT

## 2025-02-28 NOTE — DISEASE MANAGEMENT
Returned call to Harman. She is looking for her lab results from yesterday and only saw her C-peptide results. Advised that the other two drawn 2/27/25 are still in process and she should see the results on [facility] [employer] [facility] as soon as they are available. She expressed understanding.     [Clinical] : TNM Stage: c [IV] : IV [TTNM] : 4b [NTNM] : 2b [MTNM] : 0 [de-identified] : 7000cGy 51.5

## 2025-06-16 NOTE — CONSULT LETTER
[Dear  ___] : Dear  [unfilled], [Consult Letter:] : I had the pleasure of evaluating your patient, [unfilled]. [Please see my note below.] : Please see my note below. [Consult Closing:] : Thank you very much for allowing me to participate in the care of this patient.  If you have any questions, please do not hesitate to contact me. [Sincerely,] : Sincerely, [DrKailyn  ___] : Dr. ROCHA [FreeTextEntry3] : Dr. Selena Vidal Yes...

## 2025-07-17 ENCOUNTER — APPOINTMENT (OUTPATIENT)
Dept: RADIATION ONCOLOGY | Facility: CLINIC | Age: 85
End: 2025-07-17
Payer: MEDICARE

## 2025-07-17 VITALS
BODY MASS INDEX: 23.19 KG/M2 | SYSTOLIC BLOOD PRESSURE: 139 MMHG | OXYGEN SATURATION: 97 % | HEART RATE: 102 BPM | RESPIRATION RATE: 16 BRPM | WEIGHT: 171 LBS | DIASTOLIC BLOOD PRESSURE: 87 MMHG

## 2025-07-17 PROCEDURE — 99212 OFFICE O/P EST SF 10 MIN: CPT

## 2025-07-28 ENCOUNTER — NON-APPOINTMENT (OUTPATIENT)
Age: 85
End: 2025-07-28

## 2025-08-05 ENCOUNTER — RESULT REVIEW (OUTPATIENT)
Age: 85
End: 2025-08-05

## 2025-08-27 ENCOUNTER — APPOINTMENT (OUTPATIENT)
Dept: RADIATION ONCOLOGY | Facility: CLINIC | Age: 85
End: 2025-08-27

## 2025-08-27 VITALS
RESPIRATION RATE: 16 BRPM | OXYGEN SATURATION: 95 % | BODY MASS INDEX: 24.55 KG/M2 | HEART RATE: 116 BPM | SYSTOLIC BLOOD PRESSURE: 118 MMHG | WEIGHT: 181 LBS | DIASTOLIC BLOOD PRESSURE: 80 MMHG

## 2025-08-27 DIAGNOSIS — Z46.59 ENCOUNTER FOR FITTING AND ADJUSTMENT OF OTHER GASTROINTESTINAL APPLIANCE AND DEVICE: ICD-10-CM

## 2025-08-27 DIAGNOSIS — R91.1 SOLITARY PULMONARY NODULE: ICD-10-CM

## 2025-08-27 DIAGNOSIS — R13.12 DYSPHAGIA, OROPHARYNGEAL PHASE: ICD-10-CM

## 2025-08-27 DIAGNOSIS — Z92.3 PERSONAL HISTORY OF IRRADIATION: ICD-10-CM

## 2025-08-27 DIAGNOSIS — Z85.818 PERSONAL HISTORY OF MALIGNANT NEOPLASM OF OTHER SITES OF LIP, ORAL CAVITY, AND PHARYNX: ICD-10-CM

## 2025-08-27 PROCEDURE — 99205 OFFICE O/P NEW HI 60 MIN: CPT | Mod: 25

## 2025-08-29 ENCOUNTER — APPOINTMENT (OUTPATIENT)
Dept: HEMATOLOGY ONCOLOGY | Facility: CLINIC | Age: 85
End: 2025-08-29

## 2025-09-02 ENCOUNTER — RESULT REVIEW (OUTPATIENT)
Age: 85
End: 2025-09-02

## 2025-09-02 ENCOUNTER — NON-APPOINTMENT (OUTPATIENT)
Age: 85
End: 2025-09-02

## 2025-09-02 ENCOUNTER — APPOINTMENT (OUTPATIENT)
Dept: HEMATOLOGY ONCOLOGY | Facility: CLINIC | Age: 85
End: 2025-09-02
Payer: MEDICARE

## 2025-09-02 VITALS
BODY MASS INDEX: 24.52 KG/M2 | HEART RATE: 106 BPM | WEIGHT: 181 LBS | OXYGEN SATURATION: 94 % | SYSTOLIC BLOOD PRESSURE: 120 MMHG | HEIGHT: 72 IN | DIASTOLIC BLOOD PRESSURE: 83 MMHG | TEMPERATURE: 97.1 F

## 2025-09-02 DIAGNOSIS — C15.9 MALIGNANT NEOPLASM OF ESOPHAGUS, UNSPECIFIED: ICD-10-CM

## 2025-09-02 PROCEDURE — G2211 COMPLEX E/M VISIT ADD ON: CPT

## 2025-09-02 PROCEDURE — 99215 OFFICE O/P EST HI 40 MIN: CPT

## 2025-09-03 LAB
ALBUMIN SERPL ELPH-MCNC: 3.1 G/DL
ALP BLD-CCNC: 525 U/L
ALT SERPL-CCNC: 76 U/L
ANION GAP SERPL CALC-SCNC: 17 MMOL/L
AST SERPL-CCNC: 149 U/L
BILIRUB SERPL-MCNC: 0.7 MG/DL
BUN SERPL-MCNC: 61 MG/DL
CALCIUM SERPL-MCNC: 9.7 MG/DL
CHLORIDE SERPL-SCNC: 93 MMOL/L
CO2 SERPL-SCNC: 25 MMOL/L
CREAT SERPL-MCNC: 1.75 MG/DL
EGFRCR SERPLBLD CKD-EPI 2021: 38 ML/MIN/1.73M2
GLUCOSE SERPL-MCNC: 95 MG/DL
HAV IGM SER QL: NONREACTIVE
HBV CORE IGG+IGM SER QL: NONREACTIVE
HBV CORE IGM SER QL: NONREACTIVE
HBV SURFACE AB SER QL: NONREACTIVE
HBV SURFACE AG SER QL: NONREACTIVE
HCV AB SER QL: NONREACTIVE
HCV S/CO RATIO: 0.15 S/CO
INR PPP: 1.15 RATIO
MAGNESIUM SERPL-MCNC: 2.7 MG/DL
POTASSIUM SERPL-SCNC: 5.9 MMOL/L
PROT SERPL-MCNC: 6 G/DL
PT BLD: 13.3 SEC
SODIUM SERPL-SCNC: 135 MMOL/L

## 2025-09-05 RX ORDER — ONDANSETRON 8 MG/1
8 TABLET, ORALLY DISINTEGRATING ORAL EVERY 8 HOURS
Qty: 90 | Refills: 2 | Status: ACTIVE | COMMUNITY
Start: 2025-09-05 | End: 1900-01-01

## 2025-09-05 RX ORDER — OLANZAPINE 2.5 MG/1
2.5 TABLET, FILM COATED ORAL
Qty: 30 | Refills: 0 | Status: ACTIVE | COMMUNITY
Start: 2025-09-05 | End: 1900-01-01

## 2025-09-08 ENCOUNTER — APPOINTMENT (OUTPATIENT)
Dept: HEMATOLOGY ONCOLOGY | Facility: CLINIC | Age: 85
End: 2025-09-08

## 2025-09-19 ENCOUNTER — APPOINTMENT (OUTPATIENT)
Age: 85
End: 2025-09-19